# Patient Record
Sex: MALE | Race: WHITE | NOT HISPANIC OR LATINO | Employment: UNEMPLOYED | URBAN - METROPOLITAN AREA
[De-identification: names, ages, dates, MRNs, and addresses within clinical notes are randomized per-mention and may not be internally consistent; named-entity substitution may affect disease eponyms.]

---

## 2017-12-17 ENCOUNTER — APPOINTMENT (OUTPATIENT)
Dept: GENERAL RADIOLOGY | Facility: HOSPITAL | Age: 42
End: 2017-12-17

## 2017-12-17 ENCOUNTER — HOSPITAL ENCOUNTER (INPATIENT)
Facility: HOSPITAL | Age: 42
LOS: 1 days | Discharge: HOME | End: 2017-12-18
Attending: EMERGENCY MEDICINE | Admitting: INTERNAL MEDICINE

## 2017-12-17 DIAGNOSIS — I49.01 VENTRICULAR FIBRILLATION (HCC): ICD-10-CM

## 2017-12-17 DIAGNOSIS — I21.3 ST ELEVATION MYOCARDIAL INFARCTION (STEMI), UNSPECIFIED ARTERY (HCC): Primary | ICD-10-CM

## 2017-12-17 LAB
ALBUMIN SERPL-MCNC: 4.3 G/DL (ref 3.5–5.2)
ALBUMIN/GLOB SERPL: 1.4 G/DL
ALP SERPL-CCNC: 76 U/L (ref 39–117)
ALT SERPL W P-5'-P-CCNC: 39 U/L (ref 1–41)
ANION GAP SERPL CALCULATED.3IONS-SCNC: 15 MMOL/L
AST SERPL-CCNC: 23 U/L (ref 1–40)
BASOPHILS # BLD AUTO: 0.03 10*3/MM3 (ref 0–0.2)
BASOPHILS NFR BLD AUTO: 0.3 % (ref 0–1.5)
BILIRUB SERPL-MCNC: 0.2 MG/DL (ref 0.1–1.2)
BUN BLD-MCNC: 18 MG/DL (ref 6–20)
BUN/CREAT SERPL: 14.5 (ref 7–25)
CALCIUM SPEC-SCNC: 10.2 MG/DL (ref 8.6–10.5)
CHLORIDE SERPL-SCNC: 101 MMOL/L (ref 98–107)
CO2 SERPL-SCNC: 24 MMOL/L (ref 22–29)
CREAT BLD-MCNC: 1.24 MG/DL (ref 0.76–1.27)
DEPRECATED RDW RBC AUTO: 47.7 FL (ref 37–54)
EOSINOPHIL # BLD AUTO: 0.07 10*3/MM3 (ref 0–0.7)
EOSINOPHIL NFR BLD AUTO: 0.6 % (ref 0.3–6.2)
ERYTHROCYTE [DISTWIDTH] IN BLOOD BY AUTOMATED COUNT: 14.2 % (ref 11.5–14.5)
GFR SERPL CREATININE-BSD FRML MDRD: 64 ML/MIN/1.73
GLOBULIN UR ELPH-MCNC: 3 GM/DL
GLUCOSE BLD-MCNC: 122 MG/DL (ref 65–99)
GLUCOSE BLDC GLUCOMTR-MCNC: 118 MG/DL (ref 70–130)
HCT VFR BLD AUTO: 42.4 % (ref 40.4–52.2)
HGB BLD-MCNC: 13.9 G/DL (ref 13.7–17.6)
HOLD SPECIMEN: NORMAL
HOLD SPECIMEN: NORMAL
IMM GRANULOCYTES # BLD: 0.04 10*3/MM3 (ref 0–0.03)
IMM GRANULOCYTES NFR BLD: 0.3 % (ref 0–0.5)
INR PPP: 1.03 (ref 0.9–1.1)
LYMPHOCYTES # BLD AUTO: 3.86 10*3/MM3 (ref 0.9–4.8)
LYMPHOCYTES NFR BLD AUTO: 33.2 % (ref 19.6–45.3)
MAGNESIUM SERPL-MCNC: 2.1 MG/DL (ref 1.6–2.6)
MCH RBC QN AUTO: 30.3 PG (ref 27–32.7)
MCHC RBC AUTO-ENTMCNC: 32.8 G/DL (ref 32.6–36.4)
MCV RBC AUTO: 92.4 FL (ref 79.8–96.2)
MONOCYTES # BLD AUTO: 0.88 10*3/MM3 (ref 0.2–1.2)
MONOCYTES NFR BLD AUTO: 7.6 % (ref 5–12)
NEUTROPHILS # BLD AUTO: 6.75 10*3/MM3 (ref 1.9–8.1)
NEUTROPHILS NFR BLD AUTO: 58 % (ref 42.7–76)
PLATELET # BLD AUTO: 226 10*3/MM3 (ref 140–500)
PMV BLD AUTO: 11.5 FL (ref 6–12)
POTASSIUM BLD-SCNC: 4.4 MMOL/L (ref 3.5–5.2)
PROT SERPL-MCNC: 7.3 G/DL (ref 6–8.5)
PROTHROMBIN TIME: 13.1 SECONDS (ref 11.7–14.2)
RBC # BLD AUTO: 4.59 10*6/MM3 (ref 4.6–6)
SODIUM BLD-SCNC: 140 MMOL/L (ref 136–145)
TROPONIN T SERPL-MCNC: <0.01 NG/ML (ref 0–0.03)
WBC NRBC COR # BLD: 11.63 10*3/MM3 (ref 4.5–10.7)
WHOLE BLOOD HOLD SPECIMEN: NORMAL
WHOLE BLOOD HOLD SPECIMEN: NORMAL

## 2017-12-17 PROCEDURE — C1725 CATH, TRANSLUMIN NON-LASER: HCPCS | Performed by: INTERNAL MEDICINE

## 2017-12-17 PROCEDURE — C1757 CATH, THROMBECTOMY/EMBOLECT: HCPCS | Performed by: INTERNAL MEDICINE

## 2017-12-17 PROCEDURE — 25010000002 HEPARIN (PORCINE) PER 1000 UNITS: Performed by: EMERGENCY MEDICINE

## 2017-12-17 PROCEDURE — 25010000002 MIDAZOLAM PER 1 MG: Performed by: INTERNAL MEDICINE

## 2017-12-17 PROCEDURE — 93458 L HRT ARTERY/VENTRICLE ANGIO: CPT | Performed by: INTERNAL MEDICINE

## 2017-12-17 PROCEDURE — 0 IOPAMIDOL PER 1 ML: Performed by: INTERNAL MEDICINE

## 2017-12-17 PROCEDURE — 25010000002 AMIODARONE IN DEXTROSE 5% 360-4.14 MG/200ML-% SOLUTION: Performed by: EMERGENCY MEDICINE

## 2017-12-17 PROCEDURE — 25010000002 HEPARIN (PORCINE) PER 1000 UNITS: Performed by: INTERNAL MEDICINE

## 2017-12-17 PROCEDURE — 25010000002 AMIODARONE IN DEXTROSE 5% 150-4.21 MG/100ML-% SOLUTION: Performed by: EMERGENCY MEDICINE

## 2017-12-17 PROCEDURE — 4A023N7 MEASUREMENT OF CARDIAC SAMPLING AND PRESSURE, LEFT HEART, PERCUTANEOUS APPROACH: ICD-10-PCS | Performed by: INTERNAL MEDICINE

## 2017-12-17 PROCEDURE — 93010 ELECTROCARDIOGRAM REPORT: CPT | Performed by: INTERNAL MEDICINE

## 2017-12-17 PROCEDURE — 93005 ELECTROCARDIOGRAM TRACING: CPT | Performed by: INTERNAL MEDICINE

## 2017-12-17 PROCEDURE — 027035Z DILATION OF CORONARY ARTERY, ONE ARTERY WITH TWO DRUG-ELUTING INTRALUMINAL DEVICES, PERCUTANEOUS APPROACH: ICD-10-PCS | Performed by: INTERNAL MEDICINE

## 2017-12-17 PROCEDURE — 02C03ZZ EXTIRPATION OF MATTER FROM CORONARY ARTERY, ONE ARTERY, PERCUTANEOUS APPROACH: ICD-10-PCS | Performed by: INTERNAL MEDICINE

## 2017-12-17 PROCEDURE — 85610 PROTHROMBIN TIME: CPT | Performed by: EMERGENCY MEDICINE

## 2017-12-17 PROCEDURE — C1769 GUIDE WIRE: HCPCS | Performed by: INTERNAL MEDICINE

## 2017-12-17 PROCEDURE — C9606 PERC D-E COR REVASC W AMI S: HCPCS | Performed by: INTERNAL MEDICINE

## 2017-12-17 PROCEDURE — C1887 CATHETER, GUIDING: HCPCS | Performed by: INTERNAL MEDICINE

## 2017-12-17 PROCEDURE — C1874 STENT, COATED/COV W/DEL SYS: HCPCS | Performed by: INTERNAL MEDICINE

## 2017-12-17 PROCEDURE — 93005 ELECTROCARDIOGRAM TRACING: CPT | Performed by: EMERGENCY MEDICINE

## 2017-12-17 PROCEDURE — 71010 HC CHEST PA OR AP: CPT

## 2017-12-17 PROCEDURE — 25010000002 BH (CUPID ONLY) ADENOSINE 6 MG/100ML MIXTURE: Performed by: INTERNAL MEDICINE

## 2017-12-17 PROCEDURE — B2051ZZ PLAIN RADIOGRAPHY OF LEFT HEART USING LOW OSMOLAR CONTRAST: ICD-10-PCS | Performed by: INTERNAL MEDICINE

## 2017-12-17 PROCEDURE — 25010000002 PHENYLEPHRINE PER 1 ML: Performed by: INTERNAL MEDICINE

## 2017-12-17 PROCEDURE — 80053 COMPREHEN METABOLIC PANEL: CPT | Performed by: EMERGENCY MEDICINE

## 2017-12-17 PROCEDURE — 92960 CARDIOVERSION ELECTRIC EXT: CPT

## 2017-12-17 PROCEDURE — 25010000002 FENTANYL CITRATE (PF) 100 MCG/2ML SOLUTION: Performed by: INTERNAL MEDICINE

## 2017-12-17 PROCEDURE — 85347 COAGULATION TIME ACTIVATED: CPT

## 2017-12-17 PROCEDURE — 99223 1ST HOSP IP/OBS HIGH 75: CPT | Performed by: INTERNAL MEDICINE

## 2017-12-17 PROCEDURE — B2011ZZ PLAIN RADIOGRAPHY OF MULTIPLE CORONARY ARTERIES USING LOW OSMOLAR CONTRAST: ICD-10-PCS | Performed by: INTERNAL MEDICINE

## 2017-12-17 PROCEDURE — 83735 ASSAY OF MAGNESIUM: CPT | Performed by: EMERGENCY MEDICINE

## 2017-12-17 PROCEDURE — 99291 CRITICAL CARE FIRST HOUR: CPT

## 2017-12-17 PROCEDURE — 82962 GLUCOSE BLOOD TEST: CPT

## 2017-12-17 PROCEDURE — 92941 PRQ TRLML REVSC TOT OCCL AMI: CPT | Performed by: INTERNAL MEDICINE

## 2017-12-17 PROCEDURE — 84484 ASSAY OF TROPONIN QUANT: CPT | Performed by: EMERGENCY MEDICINE

## 2017-12-17 PROCEDURE — 85025 COMPLETE CBC W/AUTO DIFF WBC: CPT | Performed by: EMERGENCY MEDICINE

## 2017-12-17 PROCEDURE — C1894 INTRO/SHEATH, NON-LASER: HCPCS | Performed by: INTERNAL MEDICINE

## 2017-12-17 DEVICE — XIENCE ALPINE EVEROLIMUS ELUTING CORONARY STENT SYSTEM 3.50 MM X 38 MM / RAPID-EXCHANGE
Type: IMPLANTABLE DEVICE | Status: FUNCTIONAL
Brand: XIENCE ALPINE

## 2017-12-17 DEVICE — XIENCE ALPINE EVEROLIMUS ELUTING CORONARY STENT SYSTEM 2.75 MM X 18 MM / RAPID-EXCHANGE
Type: IMPLANTABLE DEVICE | Status: FUNCTIONAL
Brand: XIENCE ALPINE

## 2017-12-17 RX ORDER — SODIUM CHLORIDE 9 MG/ML
100 INJECTION, SOLUTION INTRAVENOUS CONTINUOUS
Status: ACTIVE | OUTPATIENT
Start: 2017-12-17 | End: 2017-12-18

## 2017-12-17 RX ORDER — LIDOCAINE HYDROCHLORIDE 20 MG/ML
INJECTION, SOLUTION INFILTRATION; PERINEURAL AS NEEDED
Status: DISCONTINUED | OUTPATIENT
Start: 2017-12-17 | End: 2017-12-17 | Stop reason: HOSPADM

## 2017-12-17 RX ORDER — LORAZEPAM 0.5 MG/1
0.5 TABLET ORAL EVERY 6 HOURS PRN
Status: DISCONTINUED | OUTPATIENT
Start: 2017-12-17 | End: 2017-12-18 | Stop reason: HOSPADM

## 2017-12-17 RX ORDER — HYDROCODONE BITARTRATE AND ACETAMINOPHEN 7.5; 325 MG/1; MG/1
1 TABLET ORAL EVERY 6 HOURS PRN
Status: DISCONTINUED | OUTPATIENT
Start: 2017-12-17 | End: 2017-12-18 | Stop reason: HOSPADM

## 2017-12-17 RX ORDER — NITROGLYCERIN 20 MG/100ML
10-50 INJECTION INTRAVENOUS
Status: DISCONTINUED | OUTPATIENT
Start: 2017-12-17 | End: 2017-12-18 | Stop reason: HOSPADM

## 2017-12-17 RX ORDER — HEPARIN SODIUM 1000 [USP'U]/ML
INJECTION, SOLUTION INTRAVENOUS; SUBCUTANEOUS AS NEEDED
Status: DISCONTINUED | OUTPATIENT
Start: 2017-12-17 | End: 2017-12-17 | Stop reason: HOSPADM

## 2017-12-17 RX ORDER — ATROPINE SULFATE 1 MG/ML
INJECTION, SOLUTION INTRAMUSCULAR; INTRAVENOUS; SUBCUTANEOUS AS NEEDED
Status: DISCONTINUED | OUTPATIENT
Start: 2017-12-17 | End: 2017-12-17 | Stop reason: HOSPADM

## 2017-12-17 RX ORDER — FENTANYL CITRATE 50 UG/ML
INJECTION, SOLUTION INTRAMUSCULAR; INTRAVENOUS AS NEEDED
Status: DISCONTINUED | OUTPATIENT
Start: 2017-12-17 | End: 2017-12-17 | Stop reason: HOSPADM

## 2017-12-17 RX ORDER — CLOPIDOGREL BISULFATE 75 MG/1
600 TABLET ORAL ONCE
Status: COMPLETED | OUTPATIENT
Start: 2017-12-17 | End: 2017-12-17

## 2017-12-17 RX ORDER — MIDAZOLAM HYDROCHLORIDE 1 MG/ML
INJECTION INTRAMUSCULAR; INTRAVENOUS AS NEEDED
Status: DISCONTINUED | OUTPATIENT
Start: 2017-12-17 | End: 2017-12-17 | Stop reason: HOSPADM

## 2017-12-17 RX ORDER — NITROGLYCERIN 5 MG/ML
INJECTION, SOLUTION INTRAVENOUS AS NEEDED
Status: DISCONTINUED | OUTPATIENT
Start: 2017-12-17 | End: 2017-12-17 | Stop reason: HOSPADM

## 2017-12-17 RX ORDER — CLOPIDOGREL BISULFATE 75 MG/1
75 TABLET ORAL DAILY
Status: DISCONTINUED | OUTPATIENT
Start: 2017-12-18 | End: 2017-12-18 | Stop reason: HOSPADM

## 2017-12-17 RX ORDER — HEPARIN SODIUM 5000 [USP'U]/ML
4000 INJECTION, SOLUTION INTRAVENOUS; SUBCUTANEOUS ONCE
Status: COMPLETED | OUTPATIENT
Start: 2017-12-17 | End: 2017-12-17

## 2017-12-17 RX ORDER — SODIUM CHLORIDE 9 MG/ML
INJECTION, SOLUTION INTRAVENOUS CONTINUOUS PRN
Status: DISCONTINUED | OUTPATIENT
Start: 2017-12-17 | End: 2017-12-17 | Stop reason: HOSPADM

## 2017-12-17 RX ORDER — SODIUM CHLORIDE 0.9 % (FLUSH) 0.9 %
10 SYRINGE (ML) INJECTION AS NEEDED
Status: DISCONTINUED | OUTPATIENT
Start: 2017-12-17 | End: 2017-12-18 | Stop reason: HOSPADM

## 2017-12-17 RX ORDER — LORAZEPAM 1 MG/1
1 TABLET ORAL EVERY 6 HOURS PRN
Status: DISCONTINUED | OUTPATIENT
Start: 2017-12-17 | End: 2017-12-18 | Stop reason: HOSPADM

## 2017-12-17 RX ORDER — ASPIRIN 81 MG/1
81 TABLET, CHEWABLE ORAL DAILY
Status: DISCONTINUED | OUTPATIENT
Start: 2017-12-18 | End: 2017-12-18 | Stop reason: HOSPADM

## 2017-12-17 RX ORDER — CYCLOBENZAPRINE HCL 10 MG
10 TABLET ORAL 3 TIMES DAILY PRN
Status: DISCONTINUED | OUTPATIENT
Start: 2017-12-17 | End: 2017-12-18 | Stop reason: HOSPADM

## 2017-12-17 RX ADMIN — SODIUM CHLORIDE 1000 ML: 9 INJECTION, SOLUTION INTRAVENOUS at 18:00

## 2017-12-17 RX ADMIN — AMIODARONE HYDROCHLORIDE 0.5 MG/MIN: 1.8 INJECTION, SOLUTION INTRAVENOUS at 23:28

## 2017-12-17 RX ADMIN — HYDROCODONE BITARTRATE AND ACETAMINOPHEN 1 TABLET: 7.5; 325 TABLET ORAL at 20:21

## 2017-12-17 RX ADMIN — CLOPIDOGREL BISULFATE 600 MG: 75 TABLET, FILM COATED ORAL at 18:08

## 2017-12-17 RX ADMIN — AMIODARONE HYDROCHLORIDE 150 MG: 1.5 INJECTION, SOLUTION INTRAVENOUS at 18:06

## 2017-12-17 RX ADMIN — HEPARIN SODIUM 4000 UNITS: 5000 INJECTION, SOLUTION INTRAVENOUS; SUBCUTANEOUS at 18:18

## 2017-12-17 RX ADMIN — SODIUM CHLORIDE 100 ML/HR: 9 INJECTION, SOLUTION INTRAVENOUS at 20:47

## 2017-12-17 RX ADMIN — AMIODARONE HYDROCHLORIDE 0.5 MG/MIN: 1.8 INJECTION, SOLUTION INTRAVENOUS at 18:19

## 2017-12-17 RX ADMIN — LORAZEPAM 1 MG: 1 TABLET ORAL at 23:43

## 2017-12-17 RX ADMIN — SODIUM CHLORIDE 100 ML/HR: 9 INJECTION, SOLUTION INTRAVENOUS at 23:28

## 2017-12-17 RX ADMIN — METOPROLOL TARTRATE 5 MG: 5 INJECTION, SOLUTION INTRAVENOUS at 18:16

## 2017-12-17 NOTE — ED NOTES
Pulse palpable present after shocked, pt alert , pt normal sinus rhythm.     Mireya Ford RN  12/17/17 4126

## 2017-12-17 NOTE — ED PROVIDER NOTES
EMERGENCY DEPARTMENT ENCOUNTER    CHIEF COMPLAINT  Chief Complaint: chest pain  History given by: EMS, patient  History limited by: nothing  Room Number: Pool/CATH  PMD: No Known Provider      HPI:  Pt is a 42 y.o. male who presents complaining of substernal chest pain that started around 16:30. Pt states the pain radiated into his BUE at onset but its not at this time. Pt also c/o diaphoresis and mild shortness of breath and denies N/V. Per EMS, Pt has been given 325 asa and nitro x1. Pt denies heart hx but does states hx of hypertension and smoking.     Duration:  1.5 hours  Onset: sudden  Timing: constant  Location: substernal  Radiation: BUE  Intensity/Severity: severe  Progression: unchanged  Associated Symptoms: diaphoresis, shortness of breath  Previous Episodes: none  Treatment before arrival: asa 325 mg and nitro x1    PAST MEDICAL HISTORY  Active Ambulatory Problems     Diagnosis Date Noted   • No Active Ambulatory Problems     Resolved Ambulatory Problems     Diagnosis Date Noted   • No Resolved Ambulatory Problems     Past Medical History:   Diagnosis Date   • Hyperlipidemia    • Hypertension        PAST SURGICAL HISTORY  Past Surgical History:   Procedure Laterality Date   • APPENDECTOMY         FAMILY HISTORY  History reviewed. No pertinent family history.    SOCIAL HISTORY  Social History     Social History   • Marital status: Single     Spouse name: N/A   • Number of children: N/A   • Years of education: N/A     Occupational History   • Not on file.     Social History Main Topics   • Smoking status: Heavy Tobacco Smoker     Packs/day: 1.00   • Smokeless tobacco: Not on file   • Alcohol use Yes      Comment: occ   • Drug use: No   • Sexual activity: No     Other Topics Concern   • Not on file     Social History Narrative       ALLERGIES  Review of patient's allergies indicates no known allergies.    REVIEW OF SYSTEMS  Review of Systems   Constitutional: Positive for diaphoresis. Negative for chills  and fever.   HENT: Negative.  Negative for sore throat.    Eyes: Negative.    Respiratory: Positive for shortness of breath. Negative for cough.    Cardiovascular: Positive for chest pain.   Gastrointestinal: Negative.    Genitourinary: Negative.  Negative for dysuria.   Musculoskeletal: Negative.  Negative for back pain.   Skin: Negative.  Negative for rash.   Neurological: Negative.  Negative for headaches.       PHYSICAL EXAM  ED Triage Vitals   Temp Heart Rate Resp BP SpO2   12/17/17 1823 12/17/17 1805 12/17/17 1819 12/17/17 1818 12/17/17 1805   97.9 °F (36.6 °C) 96 20 185/130 99 %      Temp src Heart Rate Source Patient Position BP Location FiO2 (%)   12/17/17 1823 12/17/17 1821 12/17/17 1818 12/17/17 1818 --   Tympanic Monitor Sitting Right arm          Physical Exam   Constitutional: He is oriented to person, place, and time. He appears distressed (moderate).   HENT:   Head: Normocephalic and atraumatic.   Mouth/Throat: Mucous membranes are dry.   Eyes: EOM are normal. Pupils are equal, round, and reactive to light.   Neck: Normal range of motion. Neck supple.   Cardiovascular: Normal rate, regular rhythm and normal heart sounds.    /125   Pulmonary/Chest: Effort normal and breath sounds normal. No respiratory distress.   Abdominal: Soft. There is no tenderness. There is no rebound and no guarding.   Musculoskeletal: Normal range of motion. He exhibits no edema.   Neurological: He is alert and oriented to person, place, and time. He has normal sensation and normal strength.   Skin: Skin is warm and dry.   Psychiatric: Mood and affect normal.   Nursing note and vitals reviewed.      LAB RESULTS  Lab Results (last 24 hours)     Procedure Component Value Units Date/Time    CBC & Differential [[id number]] Collected:  12/17/17 1810    Specimen:  Blood Updated:  12/17/17 1824    Narrative:       The following orders were created for panel order CBC & Differential.  Procedure                                Abnormality         Status                     ---------                               -----------         ------                     CBC Auto Differential[[id number]]        Abnormal            Final result                 Please view results for these tests on the individual orders.    Troponin [[id number]]  (Normal) Collected:  12/17/17 1810    Specimen:  Blood Updated:  12/17/17 1840     Troponin T <0.010 ng/mL     Narrative:       Troponin T Reference Ranges:  Less than 0.03 ng/mL:    Negative for AMI  0.03 to 0.09 ng/mL:      Indeterminant for AMI  Greater than 0.09 ng/mL: Positive for AMI    Comprehensive Metabolic Panel [[id number]]  (Abnormal) Collected:  12/17/17 1810    Specimen:  Blood Updated:  12/17/17 1840     Glucose 122 (H) mg/dL      BUN 18 mg/dL      Creatinine 1.24 mg/dL      Sodium 140 mmol/L      Potassium 4.4 mmol/L      Chloride 101 mmol/L      CO2 24.0 mmol/L      Calcium 10.2 mg/dL      Total Protein 7.3 g/dL      Albumin 4.30 g/dL      ALT (SGPT) 39 U/L      AST (SGOT) 23 U/L      Alkaline Phosphatase 76 U/L      Total Bilirubin 0.2 mg/dL      eGFR Non African Amer 64 mL/min/1.73      Globulin 3.0 gm/dL      A/G Ratio 1.4 g/dL      BUN/Creatinine Ratio 14.5     Anion Gap 15.0 mmol/L     Magnesium [[id number]]  (Normal) Collected:  12/17/17 1810    Specimen:  Blood Updated:  12/17/17 1840     Magnesium 2.1 mg/dL     Protime-INR [[id number]]  (Normal) Collected:  12/17/17 1810    Specimen:  Blood Updated:  12/17/17 1837     Protime 13.1 Seconds      INR 1.03    CBC Auto Differential [[id number]]  (Abnormal) Collected:  12/17/17 1810    Specimen:  Blood Updated:  12/17/17 1824     WBC 11.63 (H) 10*3/mm3      RBC 4.59 (L) 10*6/mm3      Hemoglobin 13.9 g/dL      Hematocrit 42.4 %      MCV 92.4 fL      MCH 30.3 pg      MCHC 32.8 g/dL      RDW 14.2 %      RDW-SD 47.7 fl      MPV 11.5 fL      Platelets 226 10*3/mm3      Neutrophil % 58.0 %      Lymphocyte % 33.2 %      Monocyte % 7.6 %      Eosinophil %  0.6 %      Basophil % 0.3 %      Immature Grans % 0.3 %      Neutrophils, Absolute 6.75 10*3/mm3      Lymphocytes, Absolute 3.86 10*3/mm3      Monocytes, Absolute 0.88 10*3/mm3      Eosinophils, Absolute 0.07 10*3/mm3      Basophils, Absolute 0.03 10*3/mm3      Immature Grans, Absolute 0.04 (H) 10*3/mm3           I ordered the above labs and reviewed the results    RADIOLOGY  XR Chest 1 View    (Results Pending)        I ordered the above noted radiological studies. Interpreted by radiologist. . Reviewed by me in PACS.       PROCEDURES  Critical Care  Performed by: TAYLOR RIVERO  Authorized by: TAYLOR RIVERO     Critical care provider statement:     Critical care time (minutes):  30    Critical care time was exclusive of:  Separately billable procedures and treating other patients    Critical care was necessary to treat or prevent imminent or life-threatening deterioration of the following conditions:  Cardiac failure    Critical care was time spent personally by me on the following activities:  Blood draw for specimens, development of treatment plan with patient or surrogate, discussions with consultants, evaluation of patient's response to treatment, examination of patient, obtaining history from patient or surrogate, ordering and review of laboratory studies, ordering and review of radiographic studies, ordering and performing treatments and interventions, pulse oximetry, re-evaluation of patient's condition and review of old charts        EKG    EKG time: 18:06  Rhythm/Rate: Sinus tachycardia, rate 101  ST elvation in infeior leads with ST depression anteriolateral leads, consistent with acute inferior posterior MI      Changed compared to prior in September 2016    Interpreted Contemporaneously by me.  Independently viewed by me      PROGRESS AND CONSULTS  ED Course     5:59 PM  EMS called ahead with STEMI seen on EKG. Priscila charge RN has spoken with Dr. Sahu (cardiologist) and has activated the cath lab.      6:03 PM  Pt in V-fib. Pt was shocked x1 with return to NSR and pulse.      6:05 PM  Ordered CBC. Ordered amiodarone bolus and drip.     6:08 PM  Discussed with Pt that he is having a STEMI and plan to take him to the cath lab for further treatment.     6:15 PM  Ordered lopressor.     6:28 PM  Pt rechecked and is resting comfortably. BP- (!) 153/118 HR- 92 Temp- 97.9 °F (36.6 °C) (Tympanic) O2 sat- 99%. Pt/family verbalized understanding and agree with plan to take Pt to the cath lab for intervention. All questions and concerns addressed at this time.     6:31 PM  Pt is stable still with mild substernal chest pain. Pt taken to cath lab.         MEDICAL DECISION MAKING  Results were reviewed/discussed with the patient and they were also made aware of online access. Pt also made aware that some labs, such as cultures, will not be resulted during ER visit and follow up with PMD is necessary.     MDM  Number of Diagnoses or Management Options  ST elevation myocardial infarction (STEMI), unspecified artery:   Ventricular fibrillation:      Amount and/or Complexity of Data Reviewed  Clinical lab tests: ordered and reviewed  Tests in the radiology section of CPT®: ordered and reviewed  Tests in the medicine section of CPT®: ordered and reviewed (See procedure note)           DIAGNOSIS  Final diagnoses:   ST elevation myocardial infarction (STEMI), unspecified artery   Ventricular fibrillation       DISPOSITION  Send to cath lab    Latest Documented Vital Signs:  As of 6:43 PM  BP- (!) 144/110 HR- 94 Temp- 97.9 °F (36.6 °C) (Tympanic) O2 sat- 100%    --  Documentation assistance provided by mukul Tavarez for Dr. Marcial.  Information recorded by the scribe was done at my direction and has been verified and validated by me.          Selin Tavarez  12/17/17 1843       Faisal Marcial MD  12/17/17 2035

## 2017-12-17 NOTE — ED NOTES
Pt on vf, shocked 120 J. Charge nurse Priscila in room, MD in room.     Mireya Ford RN  12/17/17 0402

## 2017-12-17 NOTE — ED NOTES
Bedside report given to primary nurse, Francesca BALDWIN.   MD currently in room with pt.     Mireya Ford RN  12/17/17 6556

## 2017-12-18 ENCOUNTER — APPOINTMENT (OUTPATIENT)
Dept: CARDIOLOGY | Facility: HOSPITAL | Age: 42
End: 2017-12-18
Attending: INTERNAL MEDICINE

## 2017-12-18 VITALS
SYSTOLIC BLOOD PRESSURE: 129 MMHG | WEIGHT: 210 LBS | DIASTOLIC BLOOD PRESSURE: 87 MMHG | RESPIRATION RATE: 18 BRPM | HEART RATE: 72 BPM | OXYGEN SATURATION: 97 % | HEIGHT: 69 IN | TEMPERATURE: 98.1 F | BODY MASS INDEX: 31.1 KG/M2

## 2017-12-18 LAB
ACT BLD: 329 SECONDS (ref 82–152)
ANION GAP SERPL CALCULATED.3IONS-SCNC: 11.2 MMOL/L
AORTIC DIMENSIONLESS INDEX: 0.8 (DI)
BH CV ECHO MEAS - ACS: 2.3 CM
BH CV ECHO MEAS - AO MAX PG: 6 MMHG
BH CV ECHO MEAS - AO MEAN PG (FULL): 1 MMHG
BH CV ECHO MEAS - AO MEAN PG: 3 MMHG
BH CV ECHO MEAS - AO ROOT AREA (BSA CORRECTED): 1.8
BH CV ECHO MEAS - AO ROOT AREA: 10.8 CM^2
BH CV ECHO MEAS - AO ROOT DIAM: 3.7 CM
BH CV ECHO MEAS - AO V2 MAX: 120 CM/SEC
BH CV ECHO MEAS - AO V2 MEAN: 87.2 CM/SEC
BH CV ECHO MEAS - AO V2 VTI: 22.7 CM
BH CV ECHO MEAS - ASC AORTA: 3.8 CM
BH CV ECHO MEAS - AVA(I,A): 3.1 CM^2
BH CV ECHO MEAS - AVA(I,D): 3.1 CM^2
BH CV ECHO MEAS - BSA(HAYCOCK): 2.2 M^2
BH CV ECHO MEAS - BSA: 2.1 M^2
BH CV ECHO MEAS - BZI_BMI: 31 KILOGRAMS/M^2
BH CV ECHO MEAS - BZI_METRIC_HEIGHT: 175.3 CM
BH CV ECHO MEAS - BZI_METRIC_WEIGHT: 95.3 KG
BH CV ECHO MEAS - CONTRAST EF (2CH): 59.3 ML/M^2
BH CV ECHO MEAS - CONTRAST EF 4CH: 58 ML/M^2
BH CV ECHO MEAS - EDV(CUBED): 97.3 ML
BH CV ECHO MEAS - EDV(MOD-SP2): 86 ML
BH CV ECHO MEAS - EDV(MOD-SP4): 88 ML
BH CV ECHO MEAS - EDV(TEICH): 97.3 ML
BH CV ECHO MEAS - EF(CUBED): 59.6 %
BH CV ECHO MEAS - EF(MOD-SP2): 59.3 %
BH CV ECHO MEAS - EF(MOD-SP4): 58 %
BH CV ECHO MEAS - EF(TEICH): 51.3 %
BH CV ECHO MEAS - ESV(CUBED): 39.3 ML
BH CV ECHO MEAS - ESV(MOD-SP2): 35 ML
BH CV ECHO MEAS - ESV(MOD-SP4): 37 ML
BH CV ECHO MEAS - ESV(TEICH): 47.4 ML
BH CV ECHO MEAS - FS: 26.1 %
BH CV ECHO MEAS - IVS/LVPW: 1.2
BH CV ECHO MEAS - IVSD: 1.1 CM
BH CV ECHO MEAS - LAT PEAK E' VEL: 11 CM/SEC
BH CV ECHO MEAS - LV DIASTOLIC VOL/BSA (35-75): 41.7 ML/M^2
BH CV ECHO MEAS - LV MASS(C)D: 158.8 GRAMS
BH CV ECHO MEAS - LV MASS(C)DI: 75.3 GRAMS/M^2
BH CV ECHO MEAS - LV MEAN PG: 2 MMHG
BH CV ECHO MEAS - LV SYSTOLIC VOL/BSA (12-30): 17.5 ML/M^2
BH CV ECHO MEAS - LV V1 MAX: 78 CM/SEC
BH CV ECHO MEAS - LV V1 MEAN: 58.3 CM/SEC
BH CV ECHO MEAS - LV V1 VTI: 18.3 CM
BH CV ECHO MEAS - LVIDD: 4.6 CM
BH CV ECHO MEAS - LVIDS: 3.4 CM
BH CV ECHO MEAS - LVLD AP2: 8 CM
BH CV ECHO MEAS - LVLD AP4: 8.6 CM
BH CV ECHO MEAS - LVLS AP2: 5.7 CM
BH CV ECHO MEAS - LVLS AP4: 6.8 CM
BH CV ECHO MEAS - LVOT AREA (M): 3.8 CM^2
BH CV ECHO MEAS - LVOT AREA: 3.8 CM^2
BH CV ECHO MEAS - LVOT DIAM: 2.2 CM
BH CV ECHO MEAS - LVPWD: 0.9 CM
BH CV ECHO MEAS - MED PEAK E' VEL: 9 CM/SEC
BH CV ECHO MEAS - MR MAX PG: 42.2 MMHG
BH CV ECHO MEAS - MR MAX VEL: 318 CM/SEC
BH CV ECHO MEAS - MV A DUR: 0.15 SEC
BH CV ECHO MEAS - MV A MAX VEL: 54.4 CM/SEC
BH CV ECHO MEAS - MV DEC SLOPE: 504 CM/SEC^2
BH CV ECHO MEAS - MV DEC TIME: 0.21 SEC
BH CV ECHO MEAS - MV E MAX VEL: 81.8 CM/SEC
BH CV ECHO MEAS - MV E/A: 1.5
BH CV ECHO MEAS - MV MAX PG: 5 MMHG
BH CV ECHO MEAS - MV MEAN PG: 2 MMHG
BH CV ECHO MEAS - MV P1/2T MAX VEL: 111 CM/SEC
BH CV ECHO MEAS - MV P1/2T: 64.5 MSEC
BH CV ECHO MEAS - MV V2 MEAN: 56.7 CM/SEC
BH CV ECHO MEAS - MV V2 VTI: 28.2 CM
BH CV ECHO MEAS - MVA P1/2T LCG: 2 CM^2
BH CV ECHO MEAS - MVA(P1/2T): 3.4 CM^2
BH CV ECHO MEAS - MVA(VTI): 2.5 CM^2
BH CV ECHO MEAS - PA ACC SLOPE: 1217 CM/SEC^2
BH CV ECHO MEAS - PA ACC TIME: 0.07 SEC
BH CV ECHO MEAS - PA MAX PG (FULL): 2.6 MMHG
BH CV ECHO MEAS - PA MAX PG: 3.6 MMHG
BH CV ECHO MEAS - PA PR(ACCEL): 45.7 MMHG
BH CV ECHO MEAS - PA V2 MAX: 94.6 CM/SEC
BH CV ECHO MEAS - PULM A REVS DUR: 0.07 SEC
BH CV ECHO MEAS - PULM A REVS VEL: 27.4 CM/SEC
BH CV ECHO MEAS - PULM DIAS VEL: 37.8 CM/SEC
BH CV ECHO MEAS - PULM S/D: 1.5
BH CV ECHO MEAS - PULM SYS VEL: 55 CM/SEC
BH CV ECHO MEAS - PVA(V,A): 1.2 CM^2
BH CV ECHO MEAS - PVA(V,D): 1.2 CM^2
BH CV ECHO MEAS - QP/QS: 0.33
BH CV ECHO MEAS - RAP SYSTOLE: 8 MMHG
BH CV ECHO MEAS - RV MAX PG: 0.94 MMHG
BH CV ECHO MEAS - RV MEAN PG: 1 MMHG
BH CV ECHO MEAS - RV V1 MAX: 48.6 CM/SEC
BH CV ECHO MEAS - RV V1 MEAN: 37.7 CM/SEC
BH CV ECHO MEAS - RV V1 VTI: 10.2 CM
BH CV ECHO MEAS - RVOT AREA: 2.3 CM^2
BH CV ECHO MEAS - RVOT DIAM: 1.7 CM
BH CV ECHO MEAS - RVSP: 34.6 MMHG
BH CV ECHO MEAS - SI(AO): 115.7 ML/M^2
BH CV ECHO MEAS - SI(CUBED): 27.5 ML/M^2
BH CV ECHO MEAS - SI(LVOT): 33 ML/M^2
BH CV ECHO MEAS - SI(MOD-SP2): 24.2 ML/M^2
BH CV ECHO MEAS - SI(MOD-SP4): 24.2 ML/M^2
BH CV ECHO MEAS - SI(TEICH): 23.7 ML/M^2
BH CV ECHO MEAS - SV(AO): 244.1 ML
BH CV ECHO MEAS - SV(CUBED): 58 ML
BH CV ECHO MEAS - SV(LVOT): 69.6 ML
BH CV ECHO MEAS - SV(MOD-SP2): 51 ML
BH CV ECHO MEAS - SV(MOD-SP4): 51 ML
BH CV ECHO MEAS - SV(RVOT): 23.2 ML
BH CV ECHO MEAS - SV(TEICH): 49.9 ML
BH CV ECHO MEAS - TAPSE (>1.6): 2.2 CM2
BH CV ECHO MEAS - TR MAX VEL: 258 CM/SEC
BH CV VAS BP RIGHT ARM: NORMAL MMHG
BH CV XLRA - RV BASE: 3 CM
BH CV XLRA - TDI S': 13 CM/SEC
BUN BLD-MCNC: 15 MG/DL (ref 6–20)
BUN/CREAT SERPL: 17.6 (ref 7–25)
CALCIUM SPEC-SCNC: 8 MG/DL (ref 8.6–10.5)
CHLORIDE SERPL-SCNC: 106 MMOL/L (ref 98–107)
CHOLEST SERPL-MCNC: 205 MG/DL (ref 0–200)
CO2 SERPL-SCNC: 23.8 MMOL/L (ref 22–29)
CREAT BLD-MCNC: 0.85 MG/DL (ref 0.76–1.27)
DEPRECATED RDW RBC AUTO: 48.8 FL (ref 37–54)
E/E' RATIO: 9
ERYTHROCYTE [DISTWIDTH] IN BLOOD BY AUTOMATED COUNT: 14.4 % (ref 11.5–14.5)
GFR SERPL CREATININE-BSD FRML MDRD: 99 ML/MIN/1.73
GLUCOSE BLD-MCNC: 132 MG/DL (ref 65–99)
GLUCOSE BLDC GLUCOMTR-MCNC: 83 MG/DL (ref 70–130)
HBA1C MFR BLD: 5.39 % (ref 4.8–5.6)
HCT VFR BLD AUTO: 37.7 % (ref 40.4–52.2)
HDLC SERPL-MCNC: 27 MG/DL (ref 40–60)
HGB BLD-MCNC: 12.1 G/DL (ref 13.7–17.6)
LDLC SERPL CALC-MCNC: 111 MG/DL (ref 0–100)
LDLC/HDLC SERPL: 4.11 {RATIO}
LEFT ATRIUM VOLUME INDEX: 23 ML/M2
LV EF 2D ECHO EST: 58 %
MAXIMAL PREDICTED HEART RATE: 179 BPM
MCH RBC QN AUTO: 29.8 PG (ref 27–32.7)
MCHC RBC AUTO-ENTMCNC: 32.1 G/DL (ref 32.6–36.4)
MCV RBC AUTO: 92.9 FL (ref 79.8–96.2)
PLATELET # BLD AUTO: 185 10*3/MM3 (ref 140–500)
PMV BLD AUTO: 11.8 FL (ref 6–12)
POTASSIUM BLD-SCNC: 3.4 MMOL/L (ref 3.5–5.2)
RBC # BLD AUTO: 4.06 10*6/MM3 (ref 4.6–6)
SODIUM BLD-SCNC: 141 MMOL/L (ref 136–145)
STRESS TARGET HR: 152 BPM
TRIGL SERPL-MCNC: 335 MG/DL (ref 0–150)
TROPONIN T SERPL-MCNC: 2.27 NG/ML (ref 0–0.03)
VLDLC SERPL-MCNC: 67 MG/DL (ref 5–40)
WBC NRBC COR # BLD: 8.86 10*3/MM3 (ref 4.5–10.7)

## 2017-12-18 PROCEDURE — 83036 HEMOGLOBIN GLYCOSYLATED A1C: CPT | Performed by: INTERNAL MEDICINE

## 2017-12-18 PROCEDURE — 82962 GLUCOSE BLOOD TEST: CPT

## 2017-12-18 PROCEDURE — 99238 HOSP IP/OBS DSCHRG MGMT 30/<: CPT | Performed by: INTERNAL MEDICINE

## 2017-12-18 PROCEDURE — 93306 TTE W/DOPPLER COMPLETE: CPT | Performed by: INTERNAL MEDICINE

## 2017-12-18 PROCEDURE — 93005 ELECTROCARDIOGRAM TRACING: CPT | Performed by: INTERNAL MEDICINE

## 2017-12-18 PROCEDURE — 80048 BASIC METABOLIC PNL TOTAL CA: CPT | Performed by: INTERNAL MEDICINE

## 2017-12-18 PROCEDURE — 93306 TTE W/DOPPLER COMPLETE: CPT

## 2017-12-18 PROCEDURE — 80061 LIPID PANEL: CPT | Performed by: INTERNAL MEDICINE

## 2017-12-18 PROCEDURE — 93010 ELECTROCARDIOGRAM REPORT: CPT | Performed by: INTERNAL MEDICINE

## 2017-12-18 PROCEDURE — 84484 ASSAY OF TROPONIN QUANT: CPT | Performed by: INTERNAL MEDICINE

## 2017-12-18 PROCEDURE — 85027 COMPLETE CBC AUTOMATED: CPT | Performed by: INTERNAL MEDICINE

## 2017-12-18 RX ORDER — CLOPIDOGREL BISULFATE 75 MG/1
75 TABLET ORAL DAILY
Qty: 30 TABLET | Refills: 5 | Status: SHIPPED | OUTPATIENT
Start: 2017-12-19 | End: 2018-02-28 | Stop reason: SDUPTHER

## 2017-12-18 RX ORDER — ASPIRIN 81 MG/1
81 TABLET, CHEWABLE ORAL DAILY
Qty: 30 TABLET | Refills: 5 | Status: SHIPPED | OUTPATIENT
Start: 2017-12-19 | End: 2018-07-04 | Stop reason: SDUPTHER

## 2017-12-18 RX ORDER — ALUMINA, MAGNESIA, AND SIMETHICONE 2400; 2400; 240 MG/30ML; MG/30ML; MG/30ML
30 SUSPENSION ORAL
Status: DISCONTINUED | OUTPATIENT
Start: 2017-12-18 | End: 2017-12-18 | Stop reason: HOSPADM

## 2017-12-18 RX ORDER — POTASSIUM CHLORIDE 750 MG/1
40 CAPSULE, EXTENDED RELEASE ORAL AS NEEDED
Status: DISCONTINUED | OUTPATIENT
Start: 2017-12-18 | End: 2017-12-18 | Stop reason: HOSPADM

## 2017-12-18 RX ORDER — POTASSIUM CHLORIDE 1.5 G/1.77G
40 POWDER, FOR SOLUTION ORAL AS NEEDED
Status: DISCONTINUED | OUTPATIENT
Start: 2017-12-18 | End: 2017-12-18 | Stop reason: HOSPADM

## 2017-12-18 RX ORDER — PANTOPRAZOLE SODIUM 40 MG/1
40 TABLET, DELAYED RELEASE ORAL
Status: DISCONTINUED | OUTPATIENT
Start: 2017-12-18 | End: 2017-12-18 | Stop reason: HOSPADM

## 2017-12-18 RX ORDER — ATORVASTATIN CALCIUM 20 MG/1
20 TABLET, FILM COATED ORAL DAILY
Qty: 30 TABLET | Refills: 11 | Status: SHIPPED | OUTPATIENT
Start: 2017-12-18 | End: 2018-11-27 | Stop reason: SDUPTHER

## 2017-12-18 RX ORDER — NICOTINE 21 MG/24HR
1 PATCH, TRANSDERMAL 24 HOURS TRANSDERMAL EVERY 24 HOURS
Status: DISCONTINUED | OUTPATIENT
Start: 2017-12-18 | End: 2017-12-18 | Stop reason: HOSPADM

## 2017-12-18 RX ORDER — CARVEDILOL 6.25 MG/1
6.25 TABLET ORAL EVERY 12 HOURS SCHEDULED
Status: DISCONTINUED | OUTPATIENT
Start: 2017-12-18 | End: 2017-12-18 | Stop reason: HOSPADM

## 2017-12-18 RX ADMIN — ALUMINUM HYDROXIDE, MAGNESIUM HYDROXIDE, AND DIMETHICONE 30 ML: 400; 400; 40 SUSPENSION ORAL at 00:38

## 2017-12-18 RX ADMIN — NICOTINE 1 PATCH: 21 PATCH, EXTENDED RELEASE TRANSDERMAL at 15:08

## 2017-12-18 RX ADMIN — HYDROCODONE BITARTRATE AND ACETAMINOPHEN 1 TABLET: 7.5; 325 TABLET ORAL at 04:22

## 2017-12-18 RX ADMIN — PANTOPRAZOLE SODIUM 40 MG: 40 TABLET, DELAYED RELEASE ORAL at 08:01

## 2017-12-18 RX ADMIN — POTASSIUM CHLORIDE 40 MEQ: 750 CAPSULE, EXTENDED RELEASE ORAL at 12:35

## 2017-12-18 RX ADMIN — ASPIRIN 81 MG: 81 TABLET, CHEWABLE ORAL at 08:01

## 2017-12-18 RX ADMIN — PANTOPRAZOLE SODIUM 40 MG: 40 TABLET, DELAYED RELEASE ORAL at 00:38

## 2017-12-18 RX ADMIN — POTASSIUM CHLORIDE 40 MEQ: 1.5 POWDER, FOR SOLUTION ORAL at 08:26

## 2017-12-18 RX ADMIN — HYDROCODONE BITARTRATE AND ACETAMINOPHEN 1 TABLET: 7.5; 325 TABLET ORAL at 10:33

## 2017-12-18 RX ADMIN — CLOPIDOGREL 75 MG: 75 TABLET, FILM COATED ORAL at 08:01

## 2017-12-18 NOTE — DISCHARGE INSTR - APPOINTMENTS
[facility] office closed. Unable to make an appointment with Magui Byrne at this time.   Paperwork given on how to achieve a primary care physician.

## 2017-12-18 NOTE — CONSULTS
Met with pt & sister at [facility] on CVU s/p MI & stent placement. Discussed benefits of cardiac rehab and provided Phase II information packet which includes a yellow cover sheet, a [facility]iac Re[facility] Programs handout, and two [place] [facility] Letter articles that stress the importance of cardiac rehab after a heart event.  Patient was interested and wanted to get registered for the program, so I gave him an appt to start cardiac rehab here at [facility] location on January 8, 2018 @ 9:30 am and provided an appointment packet.  The packet has directions to the facility and what he should bring with him to that 1st appt including his discharge instructions (AVS) from this hospitalization.  I did encourage the patient to call his insurance to check if he has any deductibles or copays.  I pointed out the number to call in case he needs to reschedule or cancel this appointment for any reason and also discussed our snow policy with him.

## 2017-12-18 NOTE — NURSING NOTE
"During data base assessment pt reports drinking beer 9-12 three or four times a week. Last beer was yesterday when pt reports \"he drank around 5.\" Also reports smoking occasional marijuana and cigarettes between 1/2 pack to 1 ppd. Pt denies drinking represents a problem in his life . Denies problems with the law due to drinking     "

## 2017-12-18 NOTE — PLAN OF CARE
Problem: Patient Care Overview (Adult)  Goal: Plan of Care Review  Outcome: Ongoing (interventions implemented as appropriate)    12/18/17 1511   Coping/Psychosocial Response Interventions   Plan Of Care Reviewed With patient   Patient Care Overview   Progress improving   Outcome Evaluation   Outcome Summary/Follow up Plan No complaints at this time. Will continue to monitor.        Goal: Adult Individualization and Mutuality  Outcome: Ongoing (interventions implemented as appropriate)  Goal: Discharge Needs Assessment  Outcome: Ongoing (interventions implemented as appropriate)    Problem: Cardiac Catheterization with/without PCI (Adult)  Goal: Signs and Symptoms of Listed Potential Problems Will be Absent or Manageable (Cardiac Catheterization with/without PCI)  Outcome: Ongoing (interventions implemented as appropriate)    12/18/17 1511   Cardiac Catheterization with/without PCI   Problems Assessed (Cardiac Catheterization) all   Problems Present (Cardiac Catheterization) none         Problem: Pain, Acute (Adult)  Goal: Identify Related Risk Factors and Signs and Symptoms  Outcome: Ongoing (interventions implemented as appropriate)  Goal: Acceptable Pain Control/Comfort Level  Outcome: Ongoing (interventions implemented as appropriate)    12/18/17 1511   Pain, Acute (Adult)   Acceptable Pain Control/Comfort Level making progress toward outcome         Problem: Acute Alcohol Withdrawal Syndrome, Risk For/Actual (Adult)  Goal: Signs and Symptoms of Listed Potential Problems Will be Absent or Manageable (Acute Alcohol Withdrawal Syndrome, Risk For/Actual)  Outcome: Ongoing (interventions implemented as appropriate)    12/18/17 1511   Acute Alcohol Withdrawal Syndrome, Risk For/Actual   Problems Assessed (Alcohol Withdrawal Syndrome) all   Problems Present (Alcohol Withdrawal Syndrome) none         Problem: Fall Risk (Adult)  Goal: Identify Related Risk Factors and Signs and Symptoms  Outcome: Ongoing (interventions  implemented as appropriate)  Goal: Absence of Falls  Outcome: Ongoing (interventions implemented as appropriate)    12/18/17 1511   Fall Risk (Adult)   Absence of Falls making progress toward outcome

## 2017-12-18 NOTE — H&P
Date of Hospital Visit: [unfilled]  Encounter Provider: Nick Sahu MD  Place of Service: [facility] CARDIOLOGY  Patient Name: Ruddy Griffin  :1975    Chief complaint:  Acute inferior STEMI complicated by VF, bradycardia, and cardiogenic shock.      History of Present Illness:    The patient is a 42-year-old man with history of smoking and hypertension.  He presented to the hospital with complaints of nausea and GI discomfort starting yesterday.  He initially thought it was heartburn.  When the pain continued he called EMS today.  EKG in the field demonstrated inferior ST segment elevations.  He had ventricular fibrillation that was treated with a single shock.  He was brought to the cardiac catheterization lab.  There he was found to have a high-grade proximal RCA lesion as well as a thrombotic total occlusion of the distal vessel.  During revascularization he developed cardiogenic shock and severe bradycardia but this stabilized after revascularization.    His LV function is preserved.  He's now stable and without complaint.    Past Medical History:   Diagnosis Date   • Hyperlipidemia    • Hypertension          Past Surgical History:   Procedure Laterality Date   • APPENDECTOMY         Prescriptions Prior to Admission   Medication Sig Dispense Refill Last Dose   • amLODIPine (NORVASC) 10 MG tablet Take 1 tablet by mouth daily. 30 tablet 0    • carvedilol (COREG) 6.25 MG tablet Take 1 tablet by mouth 2 (two) times a day with meals. 60 tablet 0    • cyclobenzaprine (FLEXERIL) 10 MG tablet Take 1 tablet by mouth 3 (three) times a day as needed for muscle spasms. 21 tablet 0    • HYDROcodone-acetaminophen (NORCO) 7.5-325 MG per tablet Take 1 tablet by mouth every 6 (six) hours as needed for moderate pain (4-6). 14 tablet 0        Current Meds  No current facility-administered medications on file prior to encounter.      Current Outpatient Prescriptions on File Prior to Encounter    Medication Sig Dispense Refill   • amLODIPine (NORVASC) 10 MG tablet Take 1 tablet by mouth daily. 30 tablet 0   • carvedilol (COREG) 6.25 MG tablet Take 1 tablet by mouth 2 (two) times a day with meals. 60 tablet 0   • cyclobenzaprine (FLEXERIL) 10 MG tablet Take 1 tablet by mouth 3 (three) times a day as needed for muscle spasms. 21 tablet 0   • HYDROcodone-acetaminophen (NORCO) 7.5-325 MG per tablet Take 1 tablet by mouth every 6 (six) hours as needed for moderate pain (4-6). 14 tablet 0         Social History     Social History   • Marital status: Single     Spouse name: N/A   • Number of children: N/A   • Years of education: N/A     Occupational History   • Not on file.     Social History Main Topics   • Smoking status: Heavy Tobacco Smoker     Packs/day: 1.00   • Smokeless tobacco: Not on file   • Alcohol use Yes      Comment: occ   • Drug use: No   • Sexual activity: No     Other Topics Concern   • Not on file     Social History Narrative     History reviewed. No pertinent family history.      REVIEW OF SYSTEMS:   12 point ROS was performed and is negative except as outlined in HPI     REVIEW OF SYSTEMS:   CONSTITUTIONAL: No weight loss, fever, chills, weakness or fatigue.   HEENT: Eyes: No visual loss, blurred vision, double vision or yellow sclerae. Ears, Nose, Throat: No hearing loss, sneezing, congestion, runny nose or sore throat.   SKIN: No rash or itching.     RESPIRATORY: No shortness of breath, hemoptysis, cough or sputum.   GASTROINTESTINAL: No anorexia, nausea, vomiting or diarrhea. No abdominal pain, bright red blood per rectum or melena.  GENITOURINARY: No burning on urination, hematuria or increased frequency.  NEUROLOGICAL: No headache, dizziness, syncope, paralysis, ataxia, numbness or tingling in the extremities. No change in bowel or bladder control.   MUSCULOSKELETAL: No muscle, back pain, joint pain or stiffness.   HEMATOLOGIC: No anemia, bleeding or bruising.   LYMPHATICS: No enlarged  "nodes. No history of splenectomy.   PSYCHIATRIC: No history of depression, anxiety, hallucinations.   ENDOCRINOLOGIC: No reports of sweating, cold or heat intolerance. No polyuria or polydipsia.       Objective:     Vitals:    12/17/17 1901 12/17/17 1906 12/17/17 1910 12/17/17 1915   BP:       BP Location:       Patient Position:       Pulse:       Resp: 20 18 20 16   Temp:       TempSrc:       SpO2:       Weight:       Height:         Body mass index is 31.01 kg/(m^2).  Flowsheet Rows         First Filed Value    Admission Height  175.3 cm (69\") Documented at 12/17/2017 1813    Admission Weight  95.3 kg (210 lb) Documented at 12/17/2017 1813          General Appearance:    Alert, cooperative, in no acute distress   Head:    Normocephalic, without obvious abnormality, atraumatic   Eyes:            Lids and lashes normal, conjunctivae and sclerae normal, no   icterus, no pallor, corneas clear, PERRLA   Ears:    Ears appear intact with no abnormalities noted   Throat:   No oral lesions, no thrush, oral mucosa moist   Neck:   No adenopathy, supple, trachea midline, no thyromegaly, no   carotid bruit, no JVD   Back:     No kyphosis present, no scoliosis present, no skin lesions, erythema or scars, no tenderness to percussion or palpation, range of motion normal   Lungs:     Clear to auscultation,respirations regular, even and unlabored    Heart:    Regular rhythm and normal rate, normal S1 and S2, no murmur, no gallop, no rub, no click   Chest Wall:    No abnormalities observed   Abdomen:     Normal bowel sounds, no masses, no organomegaly, soft        non-tender, non-distended, no guarding, no rebound  tenderness   Extremities:   Moves all extremities well, no edema, no cyanosis, no redness   Pulses:   Pulses palpable and equal bilaterally. Normal radial, carotid, femoral, dorsalis pedis and posterior tibial pulses bilaterally. Normal abdominal aorta   Skin:   No bleeding, bruising or rash   Lymph nodes:   No palpable " adenopathy   sychiatric:   Alert and oented x 3, normal mood and affect.   Lab Review:      Results from last 7 days  Lab Units 12/17/17  1810   SODIUM mmol/L 140   POTASSIUM mmol/L 4.4   CHLORIDE mmol/L 101   CO2 mmol/L 24.0   BUN mg/dL 18   CREATININE mg/dL 1.24   CALCIUM mg/dL 10.2   BILIRUBIN mg/dL 0.2   ALK PHOS U/L 76   ALT (SGPT) U/L 39   AST (SGOT) U/L 23   GLUCOSE mg/dL 122*       Results from last 7 days  Lab Units 12/17/17  1810   TROPONIN T ng/mL <0.010         Results from last 7 days  Lab Units 12/17/17  1810   WBC 10*3/mm3 11.63*   HEMOGLOBIN g/dL 13.9   HEMATOCRIT % 42.4   PLATELETS 10*3/mm3 226       Results from last 7 days  Lab Units 12/17/17  1810   INR  1.03       Results from last 7 days  Lab Units 12/17/17  1810   MAGNESIUM mg/dL 2.1         I personally viewed and interpreted the patient's EKG/Telemetry data    Assessment:    Acute inferior STEMI successfully revascularized.  He is now stable.  Plan to CCU for routine post-MI care.    Nick Sahu MD  12/17/17

## 2017-12-18 NOTE — NURSING NOTE
Reports back pain improved with po pain medication . Ate 95% of burger chips and diet coke . Aware that burger not heart healthy .  Talking to sister on phone. Education books provided on stenting heart cath and angioplasty

## 2017-12-18 NOTE — DISCHARGE SUMMARY
Date of Discharge:  12/18/2017  Date of Admit: 12/17/2017    Discharge Diagnosis:Active Problems:    ST elevation myocardial infarction (STEMI)      Hospital Course: Mr. Griffin is a 43 y/o patient with a documented h/o HTN and smoking. He was admitted yesterday with c/o nausea and GI discomfort. EKG showed ST segment elevation and he had VF that was treated with a single shock. In the cath lab he was found to have a high grade proximal RCA lesion and acute occlusion of his distal RCA-treated with thrombectomy and LAUREN. During the intervention he developed cardiogenic shock with bradycardia. He was temporarily on Amio and NTG gtts. His HR has been in the 70-80s today, with a stable bp was well. He is ready for d/c. Our office will contact him with a 1 week follow up with Magui Byrne and a 4 week f/u with me.     Procedures Performed  Procedure(s):  Left Heart Cath  Stent LAUREN coronary  Percutaneous Manual Thrombectomy       Consults     Date and Time Order Name Status Description    12/17/2017 1803 Consult Interventional Cardiology and Notify Cath Lab Completed           Pertinent Test Results:   Echo 12/18/17  · Left ventricular systolic function is normal. Calculated EF = 58%. Estimated EF was in agreement with the calculated EF. Estimated EF = 58%. Normal left ventricular cavity size and wall thickness noted. Left ventricular diastolic function is normal.  · Mild mitral valve regurgitation is present.  · Trace tricuspid valve regurgitation is present. Estimated right ventricular systolic pressure from tricuspid regurgitation is normal (<35 mmHg).  · Borderline dilation of the ascending aorta is present. Mid ascending aorta measured 3.8 cm.  The following segments are hypokinetic: basal inferior and mid inferior. All other segments are normal.      Discharge Physical Exam:    General Appearance No acute distress   Neck No adenopathy, supple, trachea midline, no thyromegaly, no carotid bruit, no JVD   Lungs Clear  to auscultation,respirations regular, even and unlabored   Heart Regular rhythm and normal rate, normal S1 and S2, no murmur, no gallop, no rub, no click   Chest wall No abnormalities observed   Abdomen Normal bowel sounds, no masses, no hepatomegaly, soft   Extremities Moves all extremities well, no edema, no cyanosis, no redness   Neurological Alert and oriented x 3     Discharge Medications   Ruddy Griffin   Home Medication Instructions KAREN:[id number]    Printed on:12/18/17 0818   Medication Information                      aspirin 81 MG chewable tablet  Chew 1 tablet Daily.             atorvastatin (LIPITOR) 20 MG tablet  Take 1 tablet by mouth Daily.             carvedilol (COREG) 6.25 MG tablet  Take 1 tablet by mouth 2 (two) times a day with meals.             clopidogrel (PLAVIX) 75 MG tablet  Take 1 tablet by mouth Daily.             cyclobenzaprine (FLEXERIL) 10 MG tablet  Take 1 tablet by mouth 3 (three) times a day as needed for muscle spasms.             HYDROcodone-acetaminophen (NORCO) 7.5-325 MG per tablet  Take 1 tablet by mouth every 6 (six) hours as needed for moderate pain (4-6).                 Discharge Diet:  healthy heart    Activity at Discharge: light activity for 2 weeks    Discharge disposition: home    Condition on Discharge: stable    Follow-up Appointments    As above.       Nick Sahu MD  12/18/17  2:52 PM

## 2017-12-18 NOTE — PAYOR COMM NOTE
"Ruddy Moore (42 y.o. Male)            ATTENTION;   [facility] FOR YOUR REVIEW, REPLY TO UR DEPT, [place] [facility]          [phone] OR UR  [phone]       Date of Birth Social Security Number Address Home Phone MRN    1975  [id number] [facility] [address] [phone] [id number]    Jewish Marital Status          None Single       Admission Date Admission Type Admitting Provider Attending Provider Department, Room/Bed    17 Emergency Nick Sahu MD Tu, Thomas, MD [facility]ON[facility]ARE, 329/    Discharge Date Discharge Disposition Discharge Destination                      Attending Provider: Nick Sahu MD     Allergies:  No Known Allergies    Isolation:  None   Infection:  None   Code Status:  FULL    Ht:  175.3 cm (69\")   Wt:  95.3 kg (210 lb)    Admission Cmt:  None   Principal Problem:  None                Active Insurance as of 2017     Primary Coverage     Payor Plan Insurance Group Employer/Plan Group    PASSPORT PAS[employer]ORT MEDICAID     Payor Plan Address Payor Plan Phone Number Effective From Effective To    [address] [phone] 2014     [place] [address]       Subscriber Name Subscriber Birth Date Member ID       RUDDY MOORE 1975 [id number]                 Emergency Contacts      (Rel.) Home Phone Work Phone Mobile Phone    Kimberlee Steiner (Sister) -- -- [phone]    Ngoc Bond (Other) -- -- [phone]               History & Physical      Nick Sahu MD at 2017  7:29 PM          Date of Hospital Visit: [unfilled]  Encounter Provider: Nick Sahu MD  Place of Service: [facility] CARDIOLOGY  Patient Name: Ruddy Moore  :1975    Chief complaint:  Acute inferior STEMI complicated by VF, bradycardia, and cardiogenic shock.      History of Present Illness:    The patient is a 42-year-old man with history of smoking and hypertension.  He " presented to the hospital with complaints of nausea and GI discomfort starting yesterday.  He initially thought it was heartburn.  When the pain continued he called EMS today.  EKG in the field demonstrated inferior ST segment elevations.  He had ventricular fibrillation that was treated with a single shock.  He was brought to the cardiac catheterization lab.  There he was found to have a high-grade proximal RCA lesion as well as a thrombotic total occlusion of the distal vessel.  During revascularization he developed cardiogenic shock and severe bradycardia but this stabilized after revascularization.    His LV function is preserved.  He's now stable and without complaint.    Past Medical History:   Diagnosis Date   • Hyperlipidemia    • Hypertension          Past Surgical History:   Procedure Laterality Date   • APPENDECTOMY         Prescriptions Prior to Admission   Medication Sig Dispense Refill Last Dose   • amLODIPine (NORVASC) 10 MG tablet Take 1 tablet by mouth daily. 30 tablet 0    • carvedilol (COREG) 6.25 MG tablet Take 1 tablet by mouth 2 (two) times a day with meals. 60 tablet 0    • cyclobenzaprine (FLEXERIL) 10 MG tablet Take 1 tablet by mouth 3 (three) times a day as needed for muscle spasms. 21 tablet 0    • HYDROcodone-acetaminophen (NORCO) 7.5-325 MG per tablet Take 1 tablet by mouth every 6 (six) hours as needed for moderate pain (4-6). 14 tablet 0        Current Meds  No current facility-administered medications on file prior to encounter.      Current Outpatient Prescriptions on File Prior to Encounter   Medication Sig Dispense Refill   • amLODIPine (NORVASC) 10 MG tablet Take 1 tablet by mouth daily. 30 tablet 0   • carvedilol (COREG) 6.25 MG tablet Take 1 tablet by mouth 2 (two) times a day with meals. 60 tablet 0   • cyclobenzaprine (FLEXERIL) 10 MG tablet Take 1 tablet by mouth 3 (three) times a day as needed for muscle spasms. 21 tablet 0   • HYDROcodone-acetaminophen (NORCO) 7.5-325 MG  per tablet Take 1 tablet by mouth every 6 (six) hours as needed for moderate pain (4-6). 14 tablet 0         Social History     Social History   • Marital status: Single     Spouse name: N/A   • Number of children: N/A   • Years of education: N/A     Occupational History   • Not on file.     Social History Main Topics   • Smoking status: Heavy Tobacco Smoker     Packs/day: 1.00   • Smokeless tobacco: Not on file   • Alcohol use Yes      Comment: occ   • Drug use: No   • Sexual activity: No     Other Topics Concern   • Not on file     Social History Narrative     History reviewed. No pertinent family history.      REVIEW OF SYSTEMS:   12 point ROS was performed and is negative except as outlined in HPI     REVIEW OF SYSTEMS:   CONSTITUTIONAL: No weight loss, fever, chills, weakness or fatigue.   HEENT: Eyes: No visual loss, blurred vision, double vision or yellow sclerae. Ears, Nose, Throat: No hearing loss, sneezing, congestion, runny nose or sore throat.   SKIN: No rash or itching.     RESPIRATORY: No shortness of breath, hemoptysis, cough or sputum.   GASTROINTESTINAL: No anorexia, nausea, vomiting or diarrhea. No abdominal pain, bright red blood per rectum or melena.  GENITOURINARY: No burning on urination, hematuria or increased frequency.  NEUROLOGICAL: No headache, dizziness, syncope, paralysis, ataxia, numbness or tingling in the extremities. No change in bowel or bladder control.   MUSCULOSKELETAL: No muscle, back pain, joint pain or stiffness.   HEMATOLOGIC: No anemia, bleeding or bruising.   LYMPHATICS: No enlarged nodes. No history of splenectomy.   PSYCHIATRIC: No history of depression, anxiety, hallucinations.   ENDOCRINOLOGIC: No reports of sweating, cold or heat intolerance. No polyuria or polydipsia.       Objective:     Vitals:    12/17/17 1901 12/17/17 1906 12/17/17 1910 12/17/17 1915   BP:       BP Location:       Patient Position:       Pulse:       Resp: 20 18 20 16   Temp:       TempSrc:      "  SpO2:       Weight:       Height:         Body mass index is 31.01 kg/(m^2).  Flowsheet Rows         First Filed Value    Admission Height  175.3 cm (69\") Documented at 12/17/2017 1813    Admission Weight  95.3 kg (210 lb) Documented at 12/17/2017 1813          General Appearance:    Alert, cooperative, in no acute distress   Head:    Normocephalic, without obvious abnormality, atraumatic   Eyes:            Lids and lashes normal, conjunctivae and sclerae normal, no   icterus, no pallor, corneas clear, PERRLA   Ears:    Ears appear intact with no abnormalities noted   Throat:   No oral lesions, no thrush, oral mucosa moist   Neck:   No adenopathy, supple, trachea midline, no thyromegaly, no   carotid bruit, no JVD   Back:     No kyphosis present, no scoliosis present, no skin lesions, erythema or scars, no tenderness to percussion or palpation, range of motion normal   Lungs:     Clear to auscultation,respirations regular, even and unlabored    Heart:    Regular rhythm and normal rate, normal S1 and S2, no murmur, no gallop, no rub, no click   Chest Wall:    No abnormalities observed   Abdomen:     Normal bowel sounds, no masses, no organomegaly, soft        non-tender, non-distended, no guarding, no rebound  tenderness   Extremities:   Moves all extremities well, no edema, no cyanosis, no redness   Pulses:   Pulses palpable and equal bilaterally. Normal radial, carotid, femoral, dorsalis pedis and posterior tibial pulses bilaterally. Normal abdominal aorta   Skin:   No bleeding, bruising or rash   Lymph nodes:   No palpable adenopathy   sychiatric:   Alert and oented x 3, normal mood and affect.   Lab Review:      Results from last 7 days  Lab Units 12/17/17 1810   SODIUM mmol/L 140   POTASSIUM mmol/L 4.4   CHLORIDE mmol/L 101   CO2 mmol/L 24.0   BUN mg/dL 18   CREATININE mg/dL 1.24   CALCIUM mg/dL 10.2   BILIRUBIN mg/dL 0.2   ALK PHOS U/L 76   ALT (SGPT) U/L 39   AST (SGOT) U/L 23   GLUCOSE mg/dL 122* "       Results from last 7 days  Lab Units 12/17/17  1810   TROPONIN T ng/mL <0.010         Results from last 7 days  Lab Units 12/17/17  1810   WBC 10*3/mm3 11.63*   HEMOGLOBIN g/dL 13.9   HEMATOCRIT % 42.4   PLATELETS 10*3/mm3 226       Results from last 7 days  Lab Units 12/17/17  1810   INR  1.03       Results from last 7 days  Lab Units 12/17/17  1810   MAGNESIUM mg/dL 2.1         I personally viewed and interpreted the patient's EKG/Telemetry data    Assessment:    Acute inferior STEMI successfully revascularized.  He is now stable.  Plan to CCU for routine post-MI care.    Nick Sahu MD  12/17/17     Electronically signed by Nick Sahu MD at 12/17/2017  7:31 PM           Emergency Department Notes      Francesca Morales RN at 12/17/2017  5:45 PM          PT called for chest pain. Ems gave pt Asprin and nitro.      Electronically signed by Francesca Morales RN at 12/17/2017  6:26 PM      Faisal Marcial MD at 12/17/2017  5:59 PM      Procedure Orders:    1. Critical Care [[id number]] ordered by Faisal Marcial MD at 12/17/17 1842                 EMERGENCY DEPARTMENT ENCOUNTER    CHIEF COMPLAINT  Chief Complaint: chest pain  History given by: EMS, patient  History limited by: nothing  Room Number: Pool/CATH  PMD: No Known Provider      HPI:  Pt is a 42 y.o. male who presents complaining of substernal chest pain that started around 16:30. Pt states the pain radiated into his BUE at onset but its not at this time. Pt also c/o diaphoresis and mild shortness of breath and denies N/V. Per EMS, Pt has been given 325 asa and nitro x1. Pt denies heart hx but does states hx of hypertension and smoking.     Duration:  1.5 hours  Onset: sudden  Timing: constant  Location: substernal  Radiation: BUE  Intensity/Severity: severe  Progression: unchanged  Associated Symptoms: diaphoresis, shortness of breath  Previous Episodes: none  Treatment before arrival: asa 325 mg and nitro x1    PAST MEDICAL HISTORY  Active Ambulatory Problems      Diagnosis Date Noted   • No Active Ambulatory Problems     Resolved Ambulatory Problems     Diagnosis Date Noted   • No Resolved Ambulatory Problems     Past Medical History:   Diagnosis Date   • Hyperlipidemia    • Hypertension        PAST SURGICAL HISTORY  Past Surgical History:   Procedure Laterality Date   • APPENDECTOMY         FAMILY HISTORY  History reviewed. No pertinent family history.    SOCIAL HISTORY  Social History     Social History   • Marital status: Single     Spouse name: N/A   • Number of children: N/A   • Years of education: N/A     Occupational History   • Not on file.     Social History Main Topics   • Smoking status: Heavy Tobacco Smoker     Packs/day: 1.00   • Smokeless tobacco: Not on file   • Alcohol use Yes      Comment: occ   • Drug use: No   • Sexual activity: No     Other Topics Concern   • Not on file     Social History Narrative       ALLERGIES  Review of patient's allergies indicates no known allergies.    REVIEW OF SYSTEMS  Review of Systems   Constitutional: Positive for diaphoresis. Negative for chills and fever.   HENT: Negative.  Negative for sore throat.    Eyes: Negative.    Respiratory: Positive for shortness of breath. Negative for cough.    Cardiovascular: Positive for chest pain.   Gastrointestinal: Negative.    Genitourinary: Negative.  Negative for dysuria.   Musculoskeletal: Negative.  Negative for back pain.   Skin: Negative.  Negative for rash.   Neurological: Negative.  Negative for headaches.       PHYSICAL EXAM  ED Triage Vitals   Temp Heart Rate Resp BP SpO2   12/17/17 1823 12/17/17 1805 12/17/17 1819 12/17/17 1818 12/17/17 1805   97.9 °F (36.6 °C) 96 20 185/130 99 %      Temp src Heart Rate Source Patient Position BP Location FiO2 (%)   12/17/17 1823 12/17/17 1821 12/17/17 1818 12/17/17 1818 --   Tympanic Monitor Sitting Right arm          Physical Exam   Constitutional: He is oriented to person, place, and time. He appears distressed (moderate).   HENT:    Head: Normocephalic and atraumatic.   Mouth/Throat: Mucous membranes are dry.   Eyes: EOM are normal. Pupils are equal, round, and reactive to light.   Neck: Normal range of motion. Neck supple.   Cardiovascular: Normal rate, regular rhythm and normal heart sounds.    /125   Pulmonary/Chest: Effort normal and breath sounds normal. No respiratory distress.   Abdominal: Soft. There is no tenderness. There is no rebound and no guarding.   Musculoskeletal: Normal range of motion. He exhibits no edema.   Neurological: He is alert and oriented to person, place, and time. He has normal sensation and normal strength.   Skin: Skin is warm and dry.   Psychiatric: Mood and affect normal.   Nursing note and vitals reviewed.      LAB RESULTS  Lab Results (last 24 hours)     Procedure Component Value Units Date/Time    CBC & Differential [[id number]] Collected:  12/17/17 1810    Specimen:  Blood Updated:  12/17/17 1824    Narrative:       The following orders were created for panel order CBC & Differential.  Procedure                               Abnormality         Status                     ---------                               -----------         ------                     CBC Auto Differential[[id number]]        Abnormal            Final result                 Please view results for these tests on the individual orders.    Troponin [[id number]]  (Normal) Collected:  12/17/17 1810    Specimen:  Blood Updated:  12/17/17 1840     Troponin T <0.010 ng/mL     Narrative:       Troponin T Reference Ranges:  Less than 0.03 ng/mL:    Negative for AMI  0.03 to 0.09 ng/mL:      Indeterminant for AMI  Greater than 0.09 ng/mL: Positive for AMI    Comprehensive Metabolic Panel [[id number]]  (Abnormal) Collected:  12/17/17 1810    Specimen:  Blood Updated:  12/17/17 1840     Glucose 122 (H) mg/dL      BUN 18 mg/dL      Creatinine 1.24 mg/dL      Sodium 140 mmol/L      Potassium 4.4 mmol/L      Chloride 101 mmol/L      CO2 24.0  mmol/L      Calcium 10.2 mg/dL      Total Protein 7.3 g/dL      Albumin 4.30 g/dL      ALT (SGPT) 39 U/L      AST (SGOT) 23 U/L      Alkaline Phosphatase 76 U/L      Total Bilirubin 0.2 mg/dL      eGFR Non African Amer 64 mL/min/1.73      Globulin 3.0 gm/dL      A/G Ratio 1.4 g/dL      BUN/Creatinine Ratio 14.5     Anion Gap 15.0 mmol/L     Magnesium [[id number]]  (Normal) Collected:  12/17/17 1810    Specimen:  Blood Updated:  12/17/17 1840     Magnesium 2.1 mg/dL     Protime-INR [[id number]]  (Normal) Collected:  12/17/17 1810    Specimen:  Blood Updated:  12/17/17 1837     Protime 13.1 Seconds      INR 1.03    CBC Auto Differential [[id number]]  (Abnormal) Collected:  12/17/17 1810    Specimen:  Blood Updated:  12/17/17 1824     WBC 11.63 (H) 10*3/mm3      RBC 4.59 (L) 10*6/mm3      Hemoglobin 13.9 g/dL      Hematocrit 42.4 %      MCV 92.4 fL      MCH 30.3 pg      MCHC 32.8 g/dL      RDW 14.2 %      RDW-SD 47.7 fl      MPV 11.5 fL      Platelets 226 10*3/mm3      Neutrophil % 58.0 %      Lymphocyte % 33.2 %      Monocyte % 7.6 %      Eosinophil % 0.6 %      Basophil % 0.3 %      Immature Grans % 0.3 %      Neutrophils, Absolute 6.75 10*3/mm3      Lymphocytes, Absolute 3.86 10*3/mm3      Monocytes, Absolute 0.88 10*3/mm3      Eosinophils, Absolute 0.07 10*3/mm3      Basophils, Absolute 0.03 10*3/mm3      Immature Grans, Absolute 0.04 (H) 10*3/mm3           I ordered the above labs and reviewed the results    RADIOLOGY  XR Chest 1 View    (Results Pending)        I ordered the above noted radiological studies. Interpreted by radiologist. . Reviewed by me in PACS.       PROCEDURES  Critical Care  Performed by: TAYLOR RIVERO  Authorized by: TAYLOR RIVERO     Critical care provider statement:     Critical care time (minutes):  30    Critical care time was exclusive of:  Separately billable procedures and treating other patients    Critical care was necessary to treat or prevent imminent or life-threatening  deterioration of the following conditions:  Cardiac failure    Critical care was time spent personally by me on the following activities:  Blood draw for specimens, development of treatment plan with patient or surrogate, discussions with consultants, evaluation of patient's response to treatment, examination of patient, obtaining history from patient or surrogate, ordering and review of laboratory studies, ordering and review of radiographic studies, ordering and performing treatments and interventions, pulse oximetry, re-evaluation of patient's condition and review of old charts        EKG    EKG time: 18:06  Rhythm/Rate: Sinus tachycardia, rate 101  ST elvation in infeior leads with ST depression anteriolateral leads, consistent with acute inferior posterior MI      Changed compared to prior in September 2016    Interpreted Contemporaneously by me.  Independently viewed by me      PROGRESS AND CONSULTS  ED Course     5:59 PM  EMS called ahead with STEMI seen on EKG. Priscila charge RN has spoken with Dr. Sahu (cardiologist) and has activated the cath lab.     6:03 PM  Pt in V-fib. Pt was shocked x1 with return to NSR and pulse.      6:05 PM  Ordered CBC. Ordered amiodarone bolus and drip.     6:08 PM  Discussed with Pt that he is having a STEMI and plan to take him to the cath lab for further treatment.     6:15 PM  Ordered lopressor.     6:28 PM  Pt rechecked and is resting comfortably. BP- (!) 153/118 HR- 92 Temp- 97.9 °F (36.6 °C) (Tympanic) O2 sat- 99%. Pt/family verbalized understanding and agree with plan to take Pt to the cath lab for intervention. All questions and concerns addressed at this time.     6:31 PM  Pt is stable still with mild substernal chest pain. Pt taken to cath lab.         MEDICAL DECISION MAKING  Results were reviewed/discussed with the patient and they were also made aware of online access. Pt also made aware that some labs, such as cultures, will not be resulted during ER visit and follow  up with PMD is necessary.     MDM  Number of Diagnoses or Management Options  ST elevation myocardial infarction (STEMI), unspecified artery:   Ventricular fibrillation:      Amount and/or Complexity of Data Reviewed  Clinical lab tests: ordered and reviewed  Tests in the radiology section of CPT®:  ordered and reviewed  Tests in the medicine section of CPT®:  ordered and reviewed (See procedure note)           DIAGNOSIS  Final diagnoses:   ST elevation myocardial infarction (STEMI), unspecified artery   Ventricular fibrillation       DISPOSITION  Send to cath lab    Latest Documented Vital Signs:  As of 6:43 PM  BP- (!) 144/110 HR- 94 Temp- 97.9 °F (36.6 °C) (Tympanic) O2 sat- 100%    --  Documentation assistance provided by mukul Tavarez for Dr. Marcial.  Information recorded by the scribe was done at my direction and has been verified and validated by me.          Selin Tavarez  12/17/17 1843       Faisal Marcial MD  12/17/17 2035       Electronically signed by Faisal Marcial MD at 12/17/2017  8:35 PM      Mireya Ford RN at 12/17/2017  6:02 PM          Pulse palpable present after shocked, pt alert , pt normal sinus rhythm.     Mireya Ford RN  12/17/17 1812       Electronically signed by Mireya Ford RN at 12/17/2017  6:12 PM      Mireya Ford RN at 12/17/2017  6:02 PM          Pt on vf, shocked 120 J. Charge nurse Priscila in room, MD in room.     Mrieya Ford RN  12/17/17 1804       Electronically signed by Mireya Ford RN at 12/17/2017  6:04 PM      Mireya Ford RN at 12/17/2017  6:05 PM          Pt placed on non rebreather.     Mireya Ford RN  12/17/17 1805       Electronically signed by Mireya Ford RN at 12/17/2017  6:05 PM      Mireya Ford RN at 12/17/2017  6:10 PM          Blood obtained and sent by Antonia BALDWIN.     Mireya Ford RN  12/17/17 1810       Electronically signed by Mireya Ford RN at 12/17/2017  6:10 PM      Mireya  "NICOLASA Ford at 12/17/2017  6:14 PM          Bedside report given to primary nurse, Francesca BALDWIN.   MD currently in room with pt.     Mireya Ford RN  12/17/17 1815       Electronically signed by Mireya Ford RN at 12/17/2017  6:15 PM        Vital Signs (last 24 hours)       12/17 0700  -  12/18 0659 12/18 0700  -  12/18 0952   Most Recent    Temp (°F)   97.9      97.5     97.5 (36.4)    Heart Rate 55 -  106    68 -  78     68    Resp 16 -  20       16    BP 95/69 -  (!) 185/130    110/73 -  113/93     113/93    SpO2 (%) 95 -  100    97 -  99     99          Lines, Drains & Airways    Active LDAs     Name:   Placement date:   Placement time:   Site:   Days:    Peripheral IV Line - Single Lumen 12/17/17 median cubital vein (antecubital fossa), right 18 gauge  12/17/17          1    Peripheral IV Line - Single Lumen 12/17/17 1816 metacarpal vein (top of hand), right 20 gauge  12/17/17    1816      less than 1    Peripheral IV Line - Single Lumen 12/17/17 1822 median cubital vein (antecubital fossa), left 20 gauge  12/17/17    1822      less than 1         Inactive LDAs     Name:   Placement date:   Placement time:   Removal date:   Removal time:   Site:   Days:    [REMOVED] Arterial Sheath 6 Fr. Right Radial          12/17/17    1906    [facility] Medications (all)       Dose Frequency Start End    aluminum-magnesium hydroxide-simethicone (MAALOX MAX) 400-400-40 MG/5ML suspension 30 mL 30 mL Every 2 Hours PRN 12/18/2017     Sig - Route: Take 30 mL by mouth Every 2 (Two) Hours As Needed for Indigestion or Heartburn. - Oral    amiodarone (NEXTERONE) 360 mg/200 mL (1.8 mg/mL) infusion 0.5 mg/min Continuous 12/17/2017     Sig - Route: Infuse 0.5 mg/min into a venous catheter Continuous. - Intravenous    Linked Group 1:  \"Followed by\" Linked Group Details        amiodarone in dextrose 5% (NEXTERONE) loading dose 150mg/100mL 150 mg Once 12/17/2017 12/17/2017    Sig - Route: Infuse 100 mL into " "a venous catheter 1 (One) Time. - Intravenous    Linked Group 1:  \"Followed by\" Linked Group Details        aspirin chewable tablet 81 mg 81 mg Daily 12/18/2017     Sig - Route: Chew 1 tablet Daily. - Oral    clopidogrel (PLAVIX) tablet 600 mg 600 mg Once 12/17/2017 12/17/2017    Sig - Route: Take 8 tablets by mouth 1 (One) Time. - Oral    clopidogrel (PLAVIX) tablet 75 mg 75 mg Daily 12/18/2017     Sig - Route: Take 1 tablet by mouth Daily. - Oral    cyclobenzaprine (FLEXERIL) tablet 10 mg 10 mg 3 Times Daily PRN 12/17/2017     Sig - Route: Take 1 tablet by mouth 3 (Three) Times a Day As Needed for Muscle Spasms. - Oral    heparin (porcine) 5000 UNIT/ML injection 4,000 Units 4,000 Units Once 12/17/2017 12/17/2017    Sig - Route: Infuse 0.8 mL into a venous catheter 1 (One) Time. - Intravenous    HYDROcodone-acetaminophen (NORCO) 7.5-325 MG per tablet 1 tablet 1 tablet Every 6 Hours PRN 12/17/2017     Sig - Route: Take 1 tablet by mouth Every 6 (Six) Hours As Needed for Moderate Pain . - Oral    LORazepam (ATIVAN) tablet 0.5 mg 0.5 mg Every 6 Hours PRN 12/17/2017 12/27/2017    Sig - Route: Take 1 tablet by mouth Every 6 (Six) Hours As Needed for Anxiety or Withdrawal. - Oral    LORazepam (ATIVAN) tablet 1 mg 1 mg Every 6 Hours PRN 12/17/2017 12/27/2017    Sig - Route: Take 1 tablet by mouth Every 6 (Six) Hours As Needed for Anxiety or Withdrawal. - Oral    metoprolol tartrate (LOPRESSOR) injection 5 mg 5 mg Once 12/17/2017 12/17/2017    Sig - Route: Infuse 5 mL into a venous catheter 1 (One) Time. - Intravenous    nitroglycerin 50 mg/250 mL (0.2 mg/mL) infusion 10-50 mcg/min Titrated 12/17/2017     Sig - Route: Infuse 10-50 mcg/min into a venous catheter Dose Adjusted By Provider As Needed. - Intravenous    pantoprazole (PROTONIX) EC tablet 40 mg 40 mg Every Early Morning 12/18/2017     Sig - Route: Take 1 tablet by mouth Every Morning. - Oral    potassium chloride (KLOR-CON) packet 40 mEq 40 mEq As Needed " 12/18/2017     Sig - Route: Take 40 mEq by mouth As Needed (potassium replacement, see admin instructions). - Oral    potassium chloride (MICRO-K) CR capsule 40 mEq 40 mEq As Needed 12/18/2017     Sig - Route: Take 4 capsules by mouth As Needed (potassium replacement.  see admin instructions). - Oral    sodium chloride 0.9 % bolus 1,000 mL 1,000 mL Once 12/17/2017 12/17/2017    Sig - Route: Infuse 1,000 mL into a venous catheter 1 (One) Time. - Intravenous    sodium chloride 0.9 % flush 10 mL 10 mL As Needed 12/17/2017     Sig - Route: Infuse 10 mL into a venous catheter As Needed for Line Care. - Intravenous    sodium chloride 0.9 % infusion 100 mL/hr Continuous 12/17/2017 12/18/2017    Sig - Route: Infuse 100 mL/hr into a venous catheter Continuous. - Intravenous    atropine injection (Discontinued)  As Needed 12/17/2017 12/17/2017    Sig: As Needed.    Reason for Discontinue: Patient Discharge    BH (CUPID ONLY) ADENOSINE 6 MG/100ML MIXTURE injection (Discontinued)  As Needed 12/17/2017 12/17/2017    Sig: As Needed.    Reason for Discontinue: Patient Discharge    fentaNYL citrate (PF) (SUBLIMAZE) injection (Discontinued)  As Needed 12/17/2017 12/17/2017    Sig: As Needed.    Reason for Discontinue: Patient Discharge    heparin (porcine) injection (Discontinued)  As Needed 12/17/2017 12/17/2017    Sig: As Needed.    Reason for Discontinue: Patient Discharge    iopamidol (ISOVUE-370) 76 % injection (Discontinued)  As Needed 12/17/2017 12/17/2017    Sig: As Needed.    Reason for Discontinue: Patient Discharge    lidocaine (XYLOCAINE) 2% injection (Discontinued)  As Needed 12/17/2017 12/17/2017    Sig: As Needed.    Reason for Discontinue: Patient Discharge    midazolam (VERSED) injection (Discontinued)  As Needed 12/17/2017 12/17/2017    Sig: As Needed.    Reason for Discontinue: Patient Discharge    nitroglycerin (TRIDIL) injection (Discontinued)  As Needed 12/17/2017 12/17/2017    Sig: As Needed.    Reason for  Discontinue: Patient Discharge    phenylephrine (CATRACHITO-SYNEPHRINE) injection (Discontinued)  As Needed 12/17/2017 12/17/2017    Sig: As Needed.    Reason for Discontinue: Patient Discharge    sodium chloride 0.9 % infusion (Discontinued)  Continuous PRN 12/17/2017 12/17/2017    Sig: Continuous As Needed.    Reason for Discontinue: Patient Discharge    verapamil (ISOPTIN) 500 mcg, nitroglycerin (TRIDIL) 200 mcg, heparin (porcine) 3,000 Units radial artery injection (Discontinued)  As Needed 12/17/2017 12/17/2017    Sig: As Needed.    Reason for Discontinue: Patient Discharge          Orders (last 24 hrs)     Start     Ordered    12/18/17 0900  clopidogrel (PLAVIX) tablet 75 mg  Daily      12/17/17 2025 12/18/17 0900  aspirin chewable tablet 81 mg  Daily      12/17/17 2025 12/18/17 0805  potassium chloride (MICRO-K) CR capsule 40 mEq  As Needed      12/18/17 0806    12/18/17 0805  potassium chloride (KLOR-CON) packet 40 mEq  As Needed      12/18/17 0806    12/18/17 0726  POC Glucose Once  Once      12/18/17 0725    12/18/17 0700  ECG 12 Lead  Once      12/17/17 2025 12/18/17 0649  POC Activated Clotting Time  Once      12/18/17 0648    12/18/17 0600  Troponin  Morning Draw      12/17/17 2025 12/18/17 0600  CBC (No Diff)  Morning Draw      12/17/17 2025 12/18/17 0600  Basic Metabolic Panel  Morning Draw      12/17/17 2025 12/18/17 0600  Hemoglobin A1c  Morning Draw      12/17/17 2025 12/18/17 0600  Lipid Panel  Morning Draw     Comments:  Fasting    12/17/17 2025 12/18/17 0115  pantoprazole (PROTONIX) EC tablet 40 mg  Every Early Morning      12/18/17 0032    12/18/17 0033  aluminum-magnesium hydroxide-simethicone (MAALOX MAX) 400-400-40 MG/5ML suspension 30 mL  Every 2 Hours PRN      12/18/17 0034    12/18/17 0000  Strict intake and output  Every 4 Hours      12/17/17 2025 12/17/17 2330  LORazepam (ATIVAN) tablet 1 mg  Every 6 Hours PRN      12/17/17 2330    12/17/17 2330  LORazepam (ATIVAN)  tablet 0.5 mg  Every 6 Hours PRN      12/17/17 2330    12/17/17 2200  Incentive Spirometry  Every 2 Hours While Awake      12/17/17 2025 12/17/17 2100  sodium chloride 0.9 % infusion  Continuous      12/17/17 2025 12/17/17 2032  POC Glucose Once  Once      12/17/17 2031 12/17/17 2026  Full Code  Continuous      12/17/17 2025 12/17/17 2026  Obtain STAT EKG during chest pain. Notify MD of any change in rhythm, ST segments or complaints of chest pain.  Until Discontinued      12/17/17 2025 12/17/17 2026  Encourage fluids  Until Discontinued      12/17/17 2025 12/17/17 2026  Verify Discontinuation of enoxaparin (LOVENOX) and / or heparin  Once      12/17/17 2025 12/17/17 2026  Notify MD if platelet count is less than 100,000, is less than 1/2 baseline, or if Hgb drops by more than 3mg/dl.  Until Discontinued      12/17/17 2025 12/17/17 2026  Notify MD of hypotension (SBP less than 95), bleeding, or dysrythmia and follow Sheath Removal Policy if needed.  Until Discontinued      12/17/17 2025 12/17/17 2026  Oxygen Therapy- Nasal Cannula; Titrate for SPO2: equal to or greater than, 92%, per policy  Continuous      12/17/17 2025 12/17/17 2026  Continuous Pulse Oximetry  Continuous      12/17/17 2025 12/17/17 2026  Diet Regular; Cardiac  Diet Effective Now      12/17/17 2025 12/17/17 2026  Advance diet as tolerated  Until Discontinued      12/17/17 2025 12/17/17 2026  Cardiac rehab evaluation  Once     Provider:  (Not yet assigned)    12/17/17 2025 12/17/17 2026  ECG 12 Lead  STAT      12/17/17 2025 12/17/17 2026  Echocardiogram 2D complete  Once      12/17/17 2025 12/17/17 2026  Remove Radial Pressure Device / Dressing  Once      12/17/17 2025 12/17/17 2026  Vital Signs & Reverse Perfecto's Test (While Radial Compression Device in Place)  Continuous     Comments:  Every 15 Minutes x4, Every 30 Minutes x4, & Every 1 Hour x2    12/17/17 2025 12/17/17 2026  Keep Affected Arm  Straight & Elevated  Continuous      12/17/17 2025 12/17/17 2026  Activity As Tolerated  Until Discontinued      12/17/17 2025 12/17/17 1913  Inpatient Admission  Once      12/17/17 1916 12/17/17 1912  HYDROcodone-acetaminophen (NORCO) 7.5-325 MG per tablet 1 tablet  Every 6 Hours PRN      12/17/17 1916 12/17/17 1912  cyclobenzaprine (FLEXERIL) tablet 10 mg  3 Times Daily PRN      12/17/17 1916 12/17/17 1911  iopamidol (ISOVUE-370) 76 % injection  As Needed,   Status:  Discontinued      12/17/17 1912 12/17/17 1859  nitroglycerin (TRIDIL) injection  As Needed,   Status:  Discontinued      12/17/17 1859 12/17/17 1859  BH (CUPID ONLY) ADENOSINE 6 MG/100ML MIXTURE injection  As Needed,   Status:  Discontinued      12/17/17 1859 12/17/17 1849  atropine injection  As Needed,   Status:  Discontinued      12/17/17 1849 12/17/17 1848  phenylephrine (CATRACHITO-SYNEPHRINE) injection  As Needed,   Status:  Discontinued      12/17/17 1848 12/17/17 1845  heparin (porcine) injection  As Needed,   Status:  Discontinued      12/17/17 1845 12/17/17 1845  fentaNYL citrate (PF) (SUBLIMAZE) injection  As Needed,   Status:  Discontinued      12/17/17 1845 12/17/17 1844  midazolam (VERSED) injection  As Needed,   Status:  Discontinued      12/17/17 1845 12/17/17 1843  Critical Care  Once     Comments:  This order was created via procedure documentation    12/17/17 1842 12/17/17 1842  verapamil (ISOPTIN) 500 mcg, nitroglycerin (TRIDIL) 200 mcg, heparin (porcine) 3,000 Units radial artery injection  As Needed,   Status:  Discontinued      12/17/17 1842 12/17/17 1842  lidocaine (XYLOCAINE) 2% injection  As Needed,   Status:  Discontinued      12/17/17 1842 12/17/17 1836  sodium chloride 0.9 % infusion  Continuous PRN,   Status:  Discontinued      12/17/17 1921 12/17/17 1823  sodium chloride 0.9 % bolus 1,000 mL  Once      12/17/17 1821 12/17/17 1817  metoprolol tartrate (LOPRESSOR) injection 5  mg  Once      12/17/17 1815    12/17/17 1815  amiodarone (NEXTERONE) 360 mg/200 mL (1.8 mg/mL) infusion  Continuous      12/17/17 1805    12/17/17 1811  Cardiac Catheterization/Vascular Study  Once      12/17/17 1810    12/17/17 1807  nitroglycerin 50 mg/250 mL (0.2 mg/mL) infusion  Titrated      12/17/17 1805    12/17/17 1807  clopidogrel (PLAVIX) tablet 600 mg  Once      12/17/17 1805    12/17/17 1807  heparin (porcine) 5000 UNIT/ML injection 4,000 Units  Once      12/17/17 1805    12/17/17 1807  amiodarone in dextrose 5% (NEXTERONE) loading dose 150mg/100mL  Once      12/17/17 1805    12/17/17 1805  ECG 12 Lead  Once      12/17/17 1804    12/17/17 1803  Initiate Department's Acute STEMI Process  Once      12/17/17 1805    12/17/17 1803  Consult Interventional Cardiology and Notify Cath Lab  Once     Provider:  Nick Sahu MD    12/17/17 1805    12/17/17 1803  NPO Diet  Diet Effective Now,   Status:  Canceled      12/17/17 1805    12/17/17 1803  Undress and Gown  Once      12/17/17 1805    12/17/17 1803  Cardiac monitoring  Per Hospital Policy      12/17/17 1805    12/17/17 1803  Continuous Pulse Oximetry  Per Hospital Policy,   Status:  Canceled      12/17/17 1805    12/17/17 1803  Vital Signs  Per Hospital Policy     Comments:  Every 5-15 mninutes    12/17/17 1805    12/17/17 1803  ECG 12 Lead  Once      12/17/17 1805    12/17/17 1803  XR Chest 1 View  1 Time Imaging      12/17/17 1805    12/17/17 1803  Insert peripheral IV - avoid Rt radial if possible  Once      12/17/17 1805    12/17/17 1803  Insert 2nd peripheral IV - avoid Rt radial if possible  Once      12/17/17 1805    12/17/17 1803  [place] Draw  Once      12/17/17 1805    12/17/17 1803  CBC & Differential  Once      12/17/17 1805    12/17/17 1803  Troponin  Once      12/17/17 1805    12/17/17 1803  Comprehensive Metabolic Panel  Once      12/17/17 1805    12/17/17 1803  Magnesium  Once      12/17/17 1805    12/17/17 1803  Protime-INR  Once      12/17/17  1805    12/17/17 1803  Light Blue Top  PROCEDURE ONCE      12/17/17 1805    12/17/17 1803  Green Top (Gel)  PROCEDURE ONCE      12/17/17 1805    12/17/17 1803  Lavender Top  PROCEDURE ONCE      12/17/17 1805    12/17/17 1803  Gold Top - SST  PROCEDURE ONCE      12/17/17 1805    12/17/17 1803  CBC Auto Differential  PROCEDURE ONCE      12/17/17 1805 12/17/17 1802  sodium chloride 0.9 % flush 10 mL  As Needed      12/17/17 1805    Unscheduled  Oxygen Therapy- Nasal Cannula; 2 LPM; Titrate for SPO2: equal to or greater than, 92%  As Needed      12/17/17 1805    Unscheduled  Up in Chair  As Needed      12/17/17 2025    Unscheduled  Vital Signs  As Needed      12/17/17 2025    Unscheduled  Check distal extremity for warmth, color, sensation and pulses with each vital sign and site check.  As Needed      12/17/17 2025    Unscheduled  Change site dressing  As Needed      12/17/17 2025    Unscheduled  Magnesium  As Needed      12/18/17 0806    Unscheduled  Potassium  As Needed      12/18/17 0806

## 2017-12-18 NOTE — DISCHARGE INSTR - ACTIVITY
Do not lift, push or pull greater than 5 lbs for at least 5 days.   You may shower, but do not submerge your incisions (i.e., no baths, pools, hot tubs, etc.) for at least one week.   Clean your incision daily in the shower with Dial or Ivory soap. Do not put any additional lotions, creams, antibiotics, or any other substance on your incision. Pat dry.   If your site starts bleeding, hold pressure for 25 minutes with your arm over your chest. If it continues to bleed after 25 minutes then proceed to the ER.

## 2017-12-18 NOTE — PLAN OF CARE
Problem: Patient Care Overview (Adult)  Goal: Plan of Care Review  Outcome: Ongoing (interventions implemented as appropriate)    12/18/17 0512   Coping/Psychosocial Response Interventions   Plan Of Care Reviewed With patient   Patient Care Overview   Progress progress toward functional goals as expected   Outcome Evaluation   Outcome Summary/Follow up Plan pt co indigestion type pain with no changes on monitor. started on po ativan for ? ETOH withdrawal drinks beer routine basis ciwa last completed at 0400 was a 4 rt radial site look good pink warm dry pt denies deficit with sensation or movement chronic back pain lortab given twice remains on amiodarone drip at 0.5          Problem: Cardiac Catheterization with/without PCI (Adult)  Goal: Signs and Symptoms of Listed Potential Problems Will be Absent or Manageable (Cardiac Catheterization with/without PCI)  Outcome: Ongoing (interventions implemented as appropriate)    Problem: Pain, Acute (Adult)  Goal: Identify Related Risk Factors and Signs and Symptoms  Outcome: Ongoing (interventions implemented as appropriate)    Problem: Acute Alcohol Withdrawal Syndrome, Risk For/Actual (Adult)  Goal: Signs and Symptoms of Listed Potential Problems Will be Absent or Manageable (Acute Alcohol Withdrawal Syndrome, Risk For/Actual)  Outcome: Ongoing (interventions implemented as appropriate)    Problem: Fall Risk (Adult)  Goal: Identify Related Risk Factors and Signs and Symptoms  Outcome: Ongoing (interventions implemented as appropriate)

## 2017-12-18 NOTE — NURSING NOTE
"During routine check pt reports feeling \"a little anxious.\" request \"something to help with that.\" see CIWA attached to orders . Call to Dr. Sahu orders Ativan po .5 to 1.0 prn q 6  Hours.  "

## 2017-12-26 ENCOUNTER — OFFICE VISIT (OUTPATIENT)
Dept: CARDIOLOGY | Facility: CLINIC | Age: 42
End: 2017-12-26

## 2017-12-26 VITALS
HEIGHT: 69 IN | WEIGHT: 224.8 LBS | DIASTOLIC BLOOD PRESSURE: 102 MMHG | SYSTOLIC BLOOD PRESSURE: 142 MMHG | HEART RATE: 98 BPM | BODY MASS INDEX: 33.3 KG/M2

## 2017-12-26 DIAGNOSIS — F17.200 NICOTINE DEPENDENCE, UNCOMPLICATED, UNSPECIFIED NICOTINE PRODUCT TYPE: ICD-10-CM

## 2017-12-26 DIAGNOSIS — I21.11 ST ELEVATION MYOCARDIAL INFARCTION INVOLVING RIGHT CORONARY ARTERY (HCC): ICD-10-CM

## 2017-12-26 DIAGNOSIS — E78.5 HYPERLIPIDEMIA, UNSPECIFIED HYPERLIPIDEMIA TYPE: ICD-10-CM

## 2017-12-26 DIAGNOSIS — I25.10 CORONARY ARTERY DISEASE INVOLVING NATIVE CORONARY ARTERY OF NATIVE HEART WITHOUT ANGINA PECTORIS: Primary | ICD-10-CM

## 2017-12-26 DIAGNOSIS — I10 ESSENTIAL HYPERTENSION: ICD-10-CM

## 2017-12-26 PROCEDURE — 99214 OFFICE O/P EST MOD 30 MIN: CPT | Performed by: NURSE PRACTITIONER

## 2017-12-26 PROCEDURE — 93000 ELECTROCARDIOGRAM COMPLETE: CPT | Performed by: NURSE PRACTITIONER

## 2017-12-26 NOTE — PROGRESS NOTES
Date of Office Visit: 2017  Encounter Provider: STANLEY Betancur  Place of Service: [facility] CARDIOLOGY  Patient Name: Ruddy Griffin  :1975    Chief Complaint   Patient presents with   • Coronary Artery Disease   :     HPI: Ruddy Griffin is a 42 y.o. male who presents today for one week hospital follow-up.  Mr. Griffin is a new patient to me and his previous records have been reviewed.  He has a history of hypertension and nicotine dependence.  He has a strong family history of coronary artery disease with his father having a heart attack at age 51 and his sister at age 46.      He presented to the [facility] emergency department on 17 with complaints of chest pain and nausea.  EKG showed ST segment elevation and had an episode of ventricular fibrillation treated with a single shock.  He was emergently taken to the Cath Lab and found to have a high-grade proximal RCA lesion and acute occlusion of his distal RCA treated with thrombectomy and drug-eluting stent.  During the intervention he developed cardiogenic shock with bradycardia and was temporarily placed on amiodarone and nitroglycerin.The distal RCA received a 2.75×18 mm Alpine stent in the proximal RCA lesion at 3.5×38 mm Alpine drug-eluting stent.  His echocardiogram revealed an EF of 58%, mild mitral valve regurgitation, trace tricuspid valve regurgitation, borderline dilation of the ascending aorta measuring 3.8 cm, and hypokinesis of the basal and mid inferior wall segments.  He was started on aspirin, atorvastatin, carvedilol, and clopidogrel.    Mr. Griffin presents today for follow-up.  He has successfully quit smoking cigarettes.  He denies any further chest pain or nausea.  He further denies shortness of air, paroxysmal nocturnal dyspnea, orthopnea, cough, edema, palpitations, dizziness, or syncope.  He is tolerating all of his medications without any adverse effects.  His blood  pressure is elevated today in the office.  He is been checking his blood pressure at home which is averaging 130s over 70s with heart rates in the 80s.  He is scheduled to start cardiac rehabilitation on 1/8/18.    The following portion of the patient's history were reviewed and updated as appropriate: past medical history, past surgical history, past social history, past family history, allergies, current medications, and problem list.    Past Medical History:   Diagnosis Date   • Coronary artery disease    • Hyperlipidemia    • Hypertension    • ST elevation myocardial infarction (STEMI) 12/17/2017       Past Surgical History:   Procedure Laterality Date   • APPENDECTOMY     • CARDIAC CATHETERIZATION N/A 12/17/2017    Procedure: Left Heart Cath;  Surgeon: Nick Sahu MD;  Location: [facility] CATH INVASIVE LOCATION;  Service:    • CARDIAC CATHETERIZATION N/A 12/17/2017    Procedure: Stent LAUREN coronary;  Surgeon: Nick Sahu MD;  Location: [facility] CATH INVASIVE LOCATION;  Service:    • CARDIAC CATHETERIZATION  12/17/2017    Procedure: Percutaneous Manual Thrombectomy;  Surgeon: Nick Sahu MD;  Location: [facility] CATH INVASIVE LOCATION;  Service:    • CORONARY STENT PLACEMENT         Social History   • Marital status: Single     Spouse name: N/A   • Number of children: N/A   • Years of education: N/A     Occupational History   • Not on file.     Social History Main Topics   • Smoking status: Former Smoker     Packs/day: 1.00     Years: 15.00     Quit date: 12/17/2017   • Smokeless tobacco: Never Used   • Alcohol use 5.4 - 7.2 oz/week     9 - 12 Cans of beer per week      Comment: reports 9-12 beers every three days or so    • Drug use: Yes     Special: Marijuana      Comment: occ   • Sexual activity: Defer       Family History   Problem Relation Age of Onset   • Heart disease Father    • Heart attack Father    • Heart attack Sister    • Heart disease Sister        Review of Systems   Constitution: Negative for chills,  diaphoresis, fever, malaise/fatigue, night sweats, weight gain and weight loss.   HENT: Negative for hearing loss, nosebleeds, sore throat and tinnitus.    Eyes: Negative for blurred vision, double vision, pain and visual disturbance.   Cardiovascular: Negative for chest pain, claudication, cyanosis, dyspnea on exertion, irregular heartbeat, leg swelling, near-syncope, orthopnea, palpitations, paroxysmal nocturnal dyspnea and syncope.   Respiratory: Negative for cough, hemoptysis, shortness of breath, snoring and wheezing.    Endocrine: Negative for cold intolerance, heat intolerance and polyuria.   Hematologic/Lymphatic: Negative for bleeding problem. Does not bruise/bleed easily.   Skin: Negative for color change, dry skin, flushing and itching.   Musculoskeletal: Negative for falls, joint pain, joint swelling, muscle cramps, muscle weakness and myalgias.   Gastrointestinal: Negative for abdominal pain, constipation, heartburn, melena, nausea and vomiting.   Genitourinary: Negative for dysuria and hematuria.   Neurological: Negative for excessive daytime sleepiness, dizziness, light-headedness, loss of balance, numbness, paresthesias, seizures and vertigo.   Psychiatric/Behavioral: Negative for altered mental status, depression, memory loss and substance abuse. The patient does not have insomnia and is not nervous/anxious.    Allergic/Immunologic: Negative for environmental allergies.       No Known Allergies      Current Outpatient Prescriptions:   •  aspirin 81 MG chewable tablet, Chew 1 tablet Daily., Disp: 30 tablet, Rfl: 5  •  atorvastatin (LIPITOR) 20 MG tablet, Take 1 tablet by mouth Daily., Disp: 30 tablet, Rfl: 11  •  carvedilol (COREG) 6.25 MG tablet, Take 1 tablet by mouth 2 (two) times a day with meals., Disp: 60 tablet, Rfl: 0  •  clopidogrel (PLAVIX) 75 MG tablet, Take 1 tablet by mouth Daily., Disp: 30 tablet, Rfl: 5  •  cyclobenzaprine (FLEXERIL) 10 MG tablet, Take 1 tablet by mouth 3 (three) times  "a day as needed for muscle spasms., Disp: 21 tablet, Rfl: 0  •  HYDROcodone-acetaminophen (NORCO) 7.5-325 MG per tablet, Take 1 tablet by mouth every 6 (six) hours as needed for moderate pain (4-6)., Disp: 14 tablet, Rfl: 0     Objective:     Vitals:    12/26/17 1314   BP: (!) 142/102   Pulse: 98   Weight: 102 kg (224 lb 12.8 oz)   Height: 175.3 cm (69\")     Body mass index is 33.2 kg/(m^2).    PHYSICAL EXAM:    Vitals Reviewed.   General Appearance: No acute distress, well developed and well nourished. Obese.   Eyes: Conjunctiva and lids: No erythema, swelling, or discharge. Sclera non-icteric.   HENT: Atraumatic, normocephalic. External eyes, ears, and nose normal. No hearing loss noted. Mucous membranes normal. Lips not cyanotic. Neck supple with no tenderness.  Respiratory: No signs of respiratory distress. Respiration rhythm and depth normal.   Clear to auscultation. No rales, crackles, rhonchi, or wheezing auscultated.   Cardiovascular:  Jugular Venous Pressure: Normal  Heart Rate and Rhythm: Normal, Heart Sounds: Normal S1 and S2. No S3 or S4 noted.  Murmurs: No murmurs noted. No rubs, thrills, or gallops.   Right wrist pulses palpable.  Cath site clean dry and intact with no evidence of bleeding or bruising.  Arterial Pulses: Carotid pulses normal. No carotid bruit noted. Posterior tibialis and dorsalis pedis pulses normal.   Lower Extremities: No edema noted.  Gastrointestinal:  Abdomen soft, non-distended, non-tender. Normal bowel sounds. No hepatomegaly.   Musculoskeletal: Normal movement of extremities  Skin and Nails: General appearance normal. No pallor, cyanosis, diaphoresis. Skin temperature normal. No clubbing of fingernails.   Psychiatric: Patient alert and oriented to person, place, and time. Speech and behavior appropriate. Normal mood and affect.       ECG 12 Lead  Date/Time: 12/26/2017 1:10 PM  Performed by: LINDA MATUTE  Authorized by: LINDA MATUTE   Comparison: compared with previous " ECG from 12/17/2017  Comparison to previous ECG: Sinus tachycardia with ST elevation  Rhythm: sinus rhythm  Rate: normal  BPM: 98  Conduction: conduction normal  ST Segments: ST segments normal  T Waves: T waves normal  QRS axis: normal  Q waves: II, III and aVF  Clinical impression: abnormal ECG  Comments: Q waves present in the inferior leads consistent with previous infarction              Assessment:       Diagnosis Plan   1. Coronary artery disease involving native coronary artery of native heart without angina pectoris     2. ST elevation myocardial infarction involving right coronary artery     3. Essential hypertension     4. Hyperlipidemia, unspecified hyperlipidemia type     5. Nicotine dependence, uncomplicated, unspecified nicotine product type            Plan:       1. Coronary Artery Disease  Assessment  • The patient has no angina    Plan  • Lifestyle modifications discussed include adhering to a heart healthy diet, avoidance of tobacco products, maintenance of a healthy weight, medication compliance, regular exercise and regular monitoring of cholesterol and blood pressure  • We discussed the importance of abstaining from cigarette smoking.  He inquired about the nicotine patch and recommended that he not use it.  He has not had a cigarette in 10 days.  He will start cardiac rehabilitation on 1/8/18  He will monitor his blood pressures at home and if they remain elevated above 140/90 or contact the office  Follow-up with Dr. Nick Sahu at the end of January  We discussed the importance of dual antiplatelet therapy and he verbalized understanding    Subjective - Objective  • There is a history of past MI  • There has been a previous stent procedure using LAUREN  • Current antiplatelet therapy includes aspirin 81 mg and clopidogrel 75 mg  • Denies any further angina    2.  Essential Hypertension: Blood pressure is elevated today in the office.  He has been checking at home and is averaging 130 over 70s.   He will call with consistent readings above 140/90.  3.  Hyperlipidemia: LDL goal less than 70.  He remains on statin therapy.  4.  Nicotine Dependence: I have applauded him for quitting cigarette smoking.  He was asking about the nicotine patch and since he has not had any nicotine in 10 days, I  have recommended that he not use a patch.  5.  Follow Up: He'll follow-up with Dr. Nick Sahu at the end of January.  I have asked him to call with any further concerns or questions.    As always, it has been a pleasure to participate in your patient's care.      Sincerely,         STANLEY Rdz

## 2018-01-03 ENCOUNTER — TRANSCRIBE ORDERS (OUTPATIENT)
Dept: CARDIAC REHAB | Facility: HOSPITAL | Age: 43
End: 2018-01-03

## 2018-01-03 DIAGNOSIS — Z95.5 STATUS POST INSERTION OF DRUG-ELUTING STENT INTO RIGHT CORONARY ARTERY FOR CORONARY ARTERY DISEASE: ICD-10-CM

## 2018-01-03 DIAGNOSIS — I21.19 ACUTE MI, INFERIOR WALL (HCC): Primary | ICD-10-CM

## 2018-01-06 ENCOUNTER — HOSPITAL ENCOUNTER (INPATIENT)
Facility: HOSPITAL | Age: 43
LOS: 4 days | Discharge: HOME | End: 2018-01-10
Attending: EMERGENCY MEDICINE | Admitting: INTERNAL MEDICINE

## 2018-01-06 ENCOUNTER — APPOINTMENT (OUTPATIENT)
Dept: GENERAL RADIOLOGY | Facility: HOSPITAL | Age: 43
End: 2018-01-06

## 2018-01-06 DIAGNOSIS — K92.2 UPPER GI BLEED: Primary | ICD-10-CM

## 2018-01-06 DIAGNOSIS — I95.89 HYPOTENSION DUE TO BLOOD LOSS: ICD-10-CM

## 2018-01-06 DIAGNOSIS — R58 HYPOTENSION DUE TO BLOOD LOSS: ICD-10-CM

## 2018-01-06 LAB
ABO GROUP BLD: NORMAL
ABO GROUP BLD: NORMAL
ALBUMIN SERPL-MCNC: 3.3 G/DL (ref 3.5–5.2)
ALBUMIN/GLOB SERPL: 1.3 G/DL
ALP SERPL-CCNC: 53 U/L (ref 39–117)
ALT SERPL W P-5'-P-CCNC: 52 U/L (ref 1–41)
ANION GAP SERPL CALCULATED.3IONS-SCNC: 14.9 MMOL/L
AST SERPL-CCNC: 18 U/L (ref 1–40)
BASOPHILS # BLD AUTO: 0.03 10*3/MM3 (ref 0–0.2)
BASOPHILS NFR BLD AUTO: 0.3 % (ref 0–1.5)
BILIRUB SERPL-MCNC: 0.4 MG/DL (ref 0.1–1.2)
BLD GP AB SCN SERPL QL: NEGATIVE
BUN BLD-MCNC: 45 MG/DL (ref 6–20)
BUN/CREAT SERPL: 39.8 (ref 7–25)
CALCIUM SPEC-SCNC: 8.3 MG/DL (ref 8.6–10.5)
CHLORIDE SERPL-SCNC: 106 MMOL/L (ref 98–107)
CO2 SERPL-SCNC: 20.1 MMOL/L (ref 22–29)
CREAT BLD-MCNC: 1.13 MG/DL (ref 0.76–1.27)
DEPRECATED RDW RBC AUTO: 47 FL (ref 37–54)
EOSINOPHIL # BLD AUTO: 0.03 10*3/MM3 (ref 0–0.7)
EOSINOPHIL NFR BLD AUTO: 0.3 % (ref 0.3–6.2)
ERYTHROCYTE [DISTWIDTH] IN BLOOD BY AUTOMATED COUNT: 14.2 % (ref 11.5–14.5)
GFR SERPL CREATININE-BSD FRML MDRD: 72 ML/MIN/1.73
GLOBULIN UR ELPH-MCNC: 2.6 GM/DL
GLUCOSE BLD-MCNC: 105 MG/DL (ref 65–99)
GLUCOSE BLDC GLUCOMTR-MCNC: 98 MG/DL (ref 70–130)
HCT VFR BLD AUTO: 35.5 % (ref 40.4–52.2)
HGB BLD-MCNC: 11.4 G/DL (ref 13.7–17.6)
HOLD SPECIMEN: NORMAL
HOLD SPECIMEN: NORMAL
IMM GRANULOCYTES # BLD: 0.06 10*3/MM3 (ref 0–0.03)
IMM GRANULOCYTES NFR BLD: 0.6 % (ref 0–0.5)
LYMPHOCYTES # BLD AUTO: 3.1 10*3/MM3 (ref 0.9–4.8)
LYMPHOCYTES NFR BLD AUTO: 33.2 % (ref 19.6–45.3)
MCH RBC QN AUTO: 29.4 PG (ref 27–32.7)
MCHC RBC AUTO-ENTMCNC: 32.1 G/DL (ref 32.6–36.4)
MCV RBC AUTO: 91.5 FL (ref 79.8–96.2)
MONOCYTES # BLD AUTO: 0.53 10*3/MM3 (ref 0.2–1.2)
MONOCYTES NFR BLD AUTO: 5.7 % (ref 5–12)
NEUTROPHILS # BLD AUTO: 5.59 10*3/MM3 (ref 1.9–8.1)
NEUTROPHILS NFR BLD AUTO: 59.9 % (ref 42.7–76)
NRBC BLD MANUAL-RTO: 0.2 /100 WBC (ref 0–0)
PLATELET # BLD AUTO: 247 10*3/MM3 (ref 140–500)
PMV BLD AUTO: 11.9 FL (ref 6–12)
POTASSIUM BLD-SCNC: 4 MMOL/L (ref 3.5–5.2)
PROT SERPL-MCNC: 5.9 G/DL (ref 6–8.5)
RBC # BLD AUTO: 3.88 10*6/MM3 (ref 4.6–6)
RH BLD: POSITIVE
RH BLD: POSITIVE
SODIUM BLD-SCNC: 141 MMOL/L (ref 136–145)
TROPONIN T SERPL-MCNC: <0.01 NG/ML (ref 0–0.03)
WBC NRBC COR # BLD: 9.34 10*3/MM3 (ref 4.5–10.7)
WHOLE BLOOD HOLD SPECIMEN: NORMAL
WHOLE BLOOD HOLD SPECIMEN: NORMAL

## 2018-01-06 PROCEDURE — 86920 COMPATIBILITY TEST SPIN: CPT

## 2018-01-06 PROCEDURE — 86850 RBC ANTIBODY SCREEN: CPT | Performed by: EMERGENCY MEDICINE

## 2018-01-06 PROCEDURE — 86901 BLOOD TYPING SEROLOGIC RH(D): CPT

## 2018-01-06 PROCEDURE — 25010000002 PROMETHAZINE PER 50 MG: Performed by: EMERGENCY MEDICINE

## 2018-01-06 PROCEDURE — 99285 EMERGENCY DEPT VISIT HI MDM: CPT

## 2018-01-06 PROCEDURE — 86900 BLOOD TYPING SEROLOGIC ABO: CPT | Performed by: EMERGENCY MEDICINE

## 2018-01-06 PROCEDURE — 71045 X-RAY EXAM CHEST 1 VIEW: CPT

## 2018-01-06 PROCEDURE — 93005 ELECTROCARDIOGRAM TRACING: CPT | Performed by: EMERGENCY MEDICINE

## 2018-01-06 PROCEDURE — 86901 BLOOD TYPING SEROLOGIC RH(D): CPT | Performed by: EMERGENCY MEDICINE

## 2018-01-06 PROCEDURE — 85025 COMPLETE CBC W/AUTO DIFF WBC: CPT | Performed by: EMERGENCY MEDICINE

## 2018-01-06 PROCEDURE — 86900 BLOOD TYPING SEROLOGIC ABO: CPT

## 2018-01-06 PROCEDURE — 80053 COMPREHEN METABOLIC PANEL: CPT | Performed by: EMERGENCY MEDICINE

## 2018-01-06 PROCEDURE — 82962 GLUCOSE BLOOD TEST: CPT

## 2018-01-06 PROCEDURE — 84484 ASSAY OF TROPONIN QUANT: CPT | Performed by: EMERGENCY MEDICINE

## 2018-01-06 RX ORDER — SODIUM CHLORIDE 0.9 % (FLUSH) 0.9 %
10 SYRINGE (ML) INJECTION AS NEEDED
Status: DISCONTINUED | OUTPATIENT
Start: 2018-01-06 | End: 2018-01-10 | Stop reason: HOSPADM

## 2018-01-06 RX ORDER — PROMETHAZINE HYDROCHLORIDE 25 MG/ML
6.25 INJECTION, SOLUTION INTRAMUSCULAR; INTRAVENOUS ONCE
Status: COMPLETED | OUTPATIENT
Start: 2018-01-06 | End: 2018-01-06

## 2018-01-06 RX ORDER — PANTOPRAZOLE SODIUM 40 MG/10ML
80 INJECTION, POWDER, LYOPHILIZED, FOR SOLUTION INTRAVENOUS ONCE
Status: COMPLETED | OUTPATIENT
Start: 2018-01-06 | End: 2018-01-06

## 2018-01-06 RX ORDER — SODIUM CHLORIDE 9 MG/ML
125 INJECTION, SOLUTION INTRAVENOUS CONTINUOUS
Status: DISCONTINUED | OUTPATIENT
Start: 2018-01-06 | End: 2018-01-08

## 2018-01-06 RX ORDER — HYDROCODONE BITARTRATE AND ACETAMINOPHEN 10; 325 MG/1; MG/1
1 TABLET ORAL EVERY 4 HOURS PRN
Status: DISCONTINUED | OUTPATIENT
Start: 2018-01-06 | End: 2018-01-10 | Stop reason: HOSPADM

## 2018-01-06 RX ORDER — MORPHINE SULFATE 2 MG/ML
2 INJECTION, SOLUTION INTRAMUSCULAR; INTRAVENOUS EVERY 4 HOURS PRN
Status: DISCONTINUED | OUTPATIENT
Start: 2018-01-06 | End: 2018-01-10 | Stop reason: HOSPADM

## 2018-01-06 RX ADMIN — SODIUM CHLORIDE 250 ML/HR: 9 INJECTION, SOLUTION INTRAVENOUS at 19:04

## 2018-01-06 RX ADMIN — SODIUM CHLORIDE 1000 ML: 9 INJECTION, SOLUTION INTRAVENOUS at 18:47

## 2018-01-06 RX ADMIN — PANTOPRAZOLE SODIUM 80 MG: 40 INJECTION, POWDER, FOR SOLUTION INTRAVENOUS at 19:01

## 2018-01-06 RX ADMIN — HYDROCODONE BITARTRATE AND ACETAMINOPHEN 1 TABLET: 10; 325 TABLET ORAL at 23:59

## 2018-01-06 RX ADMIN — SODIUM CHLORIDE 8 MG/HR: 900 INJECTION INTRAVENOUS at 19:24

## 2018-01-06 RX ADMIN — PROMETHAZINE HYDROCHLORIDE 6.25 MG: 25 INJECTION INTRAMUSCULAR; INTRAVENOUS at 20:28

## 2018-01-06 RX ADMIN — SODIUM CHLORIDE 1000 ML: 9 INJECTION, SOLUTION INTRAVENOUS at 19:26

## 2018-01-06 NOTE — ED NOTES
EYES ROLLED BACK IN HEAD - PT NOT ANSWERING QUESTIONS - BAKER AT BEDSIDE.  PT CONSCIOUS - ANSWERING QUESTIONS WITHIN 1 MINUTE. PT PALE AND DIAPHORETIC, PUPILS DILATED.      Jean Paul Carter RN  01/06/18 1834       Jean Paul Carter RN  01/06/18 4015

## 2018-01-06 NOTE — ED PROVIDER NOTES
EMERGENCY DEPARTMENT ENCOUNTER    CHIEF COMPLAINT  Chief Complaint: light headedness, diarrhea  History given by: patient  History limited by: acuity of condition  Room Number: 37/37  PMD: PAULA Puckett      HPI:  Pt is a 42 y.o. male who presents to the ED complaining of 3 episodes of dark, loose stool that he first noticed earlier today. Pt states that he then developed light headedness, chest pressure and SOA but denies N/V. EMS noted that the pt was hypotensive en route as well. Pt denies a history of gastric ulcers. Pt is on Plavix for coronary stents and denies missing any recent doses.    Duration:  One day  Onset: gradual  Timing: intermittent  Quality: dark, loose stool  Intensity/Severity: moderate  Progression: unchanged  Associated Symptoms: SOA, light headedness, chest pressure  Aggravating Factors: none  Alleviating Factors: none  Previous Episodes: Pt denies similar symptoms previously.  Treatment before arrival: none    PAST MEDICAL HISTORY  Active Ambulatory Problems     Diagnosis Date Noted   • ST elevation myocardial infarction (STEMI) 12/17/2017   • Coronary artery disease involving native coronary artery of native heart without angina pectoris 12/26/2017   • Essential hypertension 12/26/2017   • Hyperlipidemia 12/26/2017     Resolved Ambulatory Problems     Diagnosis Date Noted   • No Resolved Ambulatory Problems     Past Medical History:   Diagnosis Date   • Coronary artery disease    • Hyperlipidemia    • Hypertension    • ST elevation myocardial infarction (STEMI) 12/17/2017       PAST SURGICAL HISTORY  Past Surgical History:   Procedure Laterality Date   • APPENDECTOMY     • CARDIAC CATHETERIZATION N/A 12/17/2017    Procedure: Left Heart Cath;  Surgeon: Nick Sahu MD;  Location: [facility] INVASIVE LOCATION;  Service:    • CARDIAC CATHETERIZATION N/A 12/17/2017    Procedure: Stent LAUREN coronary;  Surgeon: Nick Sahu MD;  Location: [facility] INVASIVE LOCATION;  Service:    • CARDIAC  CATHETERIZATION  12/17/2017    Procedure: Percutaneous Manual Thrombectomy;  Surgeon: Nick Sahu MD;  Location: [facility] INVASIVE LOCATION;  Service:    • CORONARY STENT PLACEMENT         FAMILY HISTORY  Family History   Problem Relation Age of Onset   • Heart disease Father    • Heart attack Father    • Heart attack Sister    • Heart disease Sister        SOCIAL HISTORY  Social History     Social History   • Marital status: Single     Spouse name: N/A   • Number of children: N/A   • Years of education: N/A     Occupational History   • Not on file.     Social History Main Topics   • Smoking status: Former Smoker     Packs/day: 1.00     Years: 15.00     Quit date: 12/17/2017   • Smokeless tobacco: Never Used   • Alcohol use 5.4 - 7.2 oz/week     9 - 12 Cans of beer per week      Comment: reports 9-12 beers every three days or so    • Drug use: Yes     Special: Marijuana      Comment: occ   • Sexual activity: Defer     Other Topics Concern   • Not on file     Social History Narrative   • No narrative on file       ALLERGIES  Review of patient's allergies indicates no known allergies.    REVIEW OF SYSTEMS  Review of Systems   Constitutional: Negative for activity change, appetite change and fever.   HENT: Negative for congestion and sore throat.    Eyes: Negative.    Respiratory: Positive for shortness of breath. Negative for cough.    Cardiovascular: Positive for chest pain (pressure). Negative for leg swelling.   Gastrointestinal: Positive for diarrhea (dark stool). Negative for abdominal pain and vomiting.   Endocrine: Negative.    Genitourinary: Negative for decreased urine volume and dysuria.   Musculoskeletal: Negative for neck pain.   Skin: Negative for rash and wound.   Allergic/Immunologic: Negative.    Neurological: Positive for light-headedness. Negative for weakness, numbness and headaches.   Hematological: Negative.    Psychiatric/Behavioral: Negative.    All other systems reviewed and are  negative.      PHYSICAL EXAM  ED Triage Vitals   Temp Heart Rate Resp BP SpO2   -- 01/06/18 1830 01/06/18 1830 01/06/18 1833 --    132 18 75/46       Temp src Heart Rate Source Patient Position BP Location FiO2 (%)   -- 01/06/18 1830 -- -- --    Monitor          Physical Exam   Constitutional: He is oriented to person, place, and time. He appears distressed (moderate).   HENT:   Head: Normocephalic and atraumatic.   Eyes: EOM are normal. Pupils are equal, round, and reactive to light.   Pale conjunctivae. Pupils are dilated bilaterally.   Neck: Normal range of motion. Neck supple.   Cardiovascular: Regular rhythm and normal heart sounds.  Tachycardia present.    Pulmonary/Chest: Effort normal and breath sounds normal. No respiratory distress.   Abdominal: Soft. There is no tenderness. There is no rebound and no guarding.   Genitourinary: Rectal exam shows guaiac positive stool.   Genitourinary Comments: Black liquid stool in the rectal vault   Musculoskeletal: Normal range of motion. He exhibits no edema.   Neurological: He is alert and oriented to person, place, and time. He has normal sensation and normal strength.   Skin: Skin is warm. He is diaphoretic. There is pallor.   Nursing note and vitals reviewed.      LAB RESULTS  Lab Results (last 24 hours)     Procedure Component Value Units Date/Time    CBC & Differential [[id number]] Collected:  01/06/18 1845    Specimen:  Blood Updated:  01/06/18 1906    Narrative:       The following orders were created for panel order CBC & Differential.  Procedure                               Abnormality         Status                     ---------                               -----------         ------                     CBC Auto Differential[[id number]]        Abnormal            Final result                 Please view results for these tests on the individual orders.    CBC Auto Differential [[id number]]  (Abnormal) Collected:  01/06/18 1845    Specimen:  Blood Updated:   01/06/18 1906     WBC 9.34 10*3/mm3      RBC 3.88 (L) 10*6/mm3      Hemoglobin 11.4 (L) g/dL      Hematocrit 35.5 (L) %      MCV 91.5 fL      MCH 29.4 pg      MCHC 32.1 (L) g/dL      RDW 14.2 %      RDW-SD 47.0 fl      MPV 11.9 fL      Platelets 247 10*3/mm3      Neutrophil % 59.9 %      Lymphocyte % 33.2 %      Monocyte % 5.7 %      Eosinophil % 0.3 %      Basophil % 0.3 %      Immature Grans % 0.6 (H) %      Neutrophils, Absolute 5.59 10*3/mm3      Lymphocytes, Absolute 3.10 10*3/mm3      Monocytes, Absolute 0.53 10*3/mm3      Eosinophils, Absolute 0.03 10*3/mm3      Basophils, Absolute 0.03 10*3/mm3      Immature Grans, Absolute 0.06 (H) 10*3/mm3      nRBC 0.2 (H) /100 WBC     Comprehensive Metabolic Panel [[id number]]  (Abnormal) Collected:  01/06/18 1939    Specimen:  Blood Updated:  01/06/18 2015     Glucose 105 (H) mg/dL      BUN 45 (H) mg/dL      Creatinine 1.13 mg/dL      Sodium 141 mmol/L      Potassium 4.0 mmol/L      Chloride 106 mmol/L      CO2 20.1 (L) mmol/L      Calcium 8.3 (L) mg/dL      Total Protein 5.9 (L) g/dL      Albumin 3.30 (L) g/dL      ALT (SGPT) 52 (H) U/L      AST (SGOT) 18 U/L      Alkaline Phosphatase 53 U/L      Total Bilirubin 0.4 mg/dL      eGFR Non African Amer 72 mL/min/1.73      Globulin 2.6 gm/dL      A/G Ratio 1.3 g/dL      BUN/Creatinine Ratio 39.8 (H)     Anion Gap 14.9 mmol/L     Troponin [[id number]]  (Normal) Collected:  01/06/18 1939    Specimen:  Blood Updated:  01/06/18 2013     Troponin T <0.010 ng/mL     Narrative:       Troponin T Reference Ranges:  Less than 0.03 ng/mL:    Negative for AMI  0.03 to 0.09 ng/mL:      Indeterminant for AMI  Greater than 0.09 ng/mL: Positive for AMI          I ordered the above labs and reviewed the results    RADIOLOGY  XR Chest 1 View            Reviewed pt's CXR, which shows nothing acute. Independently viewed by me. Interpreted by radiologist. .    I ordered the above noted radiological studies. Interpreted by radiologist. Reviewed by  me in PACS.       PROCEDURES  Critical Care  Performed by: VÍCTOR PRICE  Authorized by: VÍCTOR PRICE     Critical care provider statement:     Critical care time (minutes):  45    Critical care time was exclusive of:  Separately billable procedures and treating other patients    Critical care was necessary to treat or prevent imminent or life-threatening deterioration of the following conditions:  Circulatory failure    Critical care was time spent personally by me on the following activities:  Development of treatment plan with patient or surrogate, discussions with consultants, evaluation of patient's response to treatment, examination of patient, obtaining history from patient or surrogate, ordering and performing treatments and interventions, ordering and review of laboratory studies, ordering and review of radiographic studies, pulse oximetry, re-evaluation of patient's condition and review of old charts           EKG           EKG time: 1837  Rhythm/Rate: sinus tachycardia, 106  P waves and VA: normal  QRS, axis: normal   ST and T waves: non-specific ST and T wave changes  Artifact present     Interpreted Contemporaneously by me, independently viewed  changed compared to prior 12-18-17 (inferior ST changes have improved)    PROGRESS AND CONSULTS  ED Course     1839- Notified pt that his EKG does not indicate that the pt is having a MI at this time. Discussed the plan to order lab and imaging studies prior to admitting the pt for his GI bleed. Pt agrees with the plan and all questions.    1853- Ordered IVF for hydration and protonix for GI bleed.    1908- Rechecked pt. Pt is now complaining of nausea and continued intermittent light headedness. Pt's XL=568/71. Notified pt of his hemoglobin level and that I am waiting on his additional lab results. Pt agrees with the plan and all questions were addressed.    1927- Per RN, the pt's BP is 72/40 and HR is in the 130's. Per RN, the pt is complaining of light  headedness again and she increased the pt's IVF flow rate. Ordered additional liter of IVF for hypotension and will administer PRBCs if his pressure does not improve.    1930- Ordered 2 units of PRBCs for GI bleed.    1956- Per RN, the pt's BP has improved to 109 systolic with IVF.     2013- Per RN, the pt is complaining of nausea. Ordered phenergan for nausea.    2016- Rechecked pt. Pt's RA=943/77    2020- Placed call to pulmonology for admission.    2024- Discussed the pt's case with Dr. Raul Gann (pulmonology) who agrees to admit the pt to the ICU.    2045- Rechecked pt. Pt is resting comfortably. Notified pt of his lab results, including his elevated BUN and negative troponin. Discussed the consult with Dr. Gann (pulmonology) and the plan to admit the pt for further evaluation. Pt agrees with the plan and all questions were addressed.      MEDICAL DECISION MAKING  Results were reviewed/discussed with the patient and they were also made aware of online access. Pt also made aware that some labs, such as cultures, will not be resulted during ER visit and follow up with PMD is necessary.     MDM  Number of Diagnoses or Management Options  Hypotension due to blood loss:   Upper GI bleed:      Amount and/or Complexity of Data Reviewed  Clinical lab tests: ordered and reviewed (Hgb=11.9, BUN=45)  Tests in the radiology section of CPT®: ordered and reviewed (CXR shows nothing acute)  Tests in the medicine section of CPT®: ordered and reviewed (See EKG procedure note)  Decide to obtain previous medical records or to obtain history from someone other than the patient: yes  Review and summarize past medical records: yes (Pt was admitted on 12-17-17 for a STEMI. Pt was noted to be in V-fib and treated with amiodarone and a shock. Pt had a RCA stent placed by Dr. Sahu (cardiology).)  Discuss the patient with other providers: yes (D/w Dr. Raul Gann (pulmonology))  Independent visualization of images, tracings, or  specimens: yes    Critical Care  Total time providing critical care: 30-74 minutes         DIAGNOSIS  Final diagnoses:   Upper GI bleed   Hypotension due to blood loss       DISPOSITION  ADMISSION    Discussed treatment plan and reason for admission with pt/family and admitting physician.  Pt/family voiced understanding of the plan for admission for further testing/treatment as needed.     Latest Documented Vital Signs:  As of 8:46 PM  BP- 102/71 HR- 101 Temp- 98 °F (36.7 °C) (Oral) O2 sat- 98%    --  Documentation assistance provided by mukul Bocanegra for Dr. Osorio.  Information recorded by the scribe was done at my direction and has been verified and validated by me.     Donna Bocanegra  01/06/18 0605       eNil Osorio MD  01/08/18 5288

## 2018-01-06 NOTE — ED TRIAGE NOTES
PT HAD SYNCOPAL EPISODE TODAY REPORTS DIARRHEA ALL DAY AND BM DURING SYNCOPAL EPISODE, EMS CALLED AHEAD FOR SOA AND 62/PALP BP.

## 2018-01-07 ENCOUNTER — ANESTHESIA EVENT (OUTPATIENT)
Dept: GASTROENTEROLOGY | Facility: HOSPITAL | Age: 43
End: 2018-01-07

## 2018-01-07 ENCOUNTER — ANESTHESIA (OUTPATIENT)
Dept: PERIOP | Facility: HOSPITAL | Age: 43
End: 2018-01-07

## 2018-01-07 ENCOUNTER — ANESTHESIA EVENT (OUTPATIENT)
Dept: PERIOP | Facility: HOSPITAL | Age: 43
End: 2018-01-07

## 2018-01-07 ENCOUNTER — ANESTHESIA (OUTPATIENT)
Dept: GASTROENTEROLOGY | Facility: HOSPITAL | Age: 43
End: 2018-01-07

## 2018-01-07 LAB
HCT VFR BLD AUTO: 27.1 % (ref 40.4–52.2)
HCT VFR BLD AUTO: 27.7 % (ref 40.4–52.2)
HCT VFR BLD AUTO: 30.7 % (ref 40.4–52.2)
HGB BLD-MCNC: 8.9 G/DL (ref 13.7–17.6)
HGB BLD-MCNC: 9 G/DL (ref 13.7–17.6)
HGB BLD-MCNC: 9.7 G/DL (ref 13.7–17.6)
TROPONIN T SERPL-MCNC: <0.01 NG/ML (ref 0–0.03)

## 2018-01-07 PROCEDURE — 25010000002 MIDAZOLAM PER 1 MG: Performed by: ANESTHESIOLOGY

## 2018-01-07 PROCEDURE — 99254 IP/OBS CNSLTJ NEW/EST MOD 60: CPT | Performed by: INTERNAL MEDICINE

## 2018-01-07 PROCEDURE — 84484 ASSAY OF TROPONIN QUANT: CPT | Performed by: INTERNAL MEDICINE

## 2018-01-07 PROCEDURE — 25010000002 ONDANSETRON PER 1 MG: Performed by: NURSE ANESTHETIST, CERTIFIED REGISTERED

## 2018-01-07 PROCEDURE — 25010000002 EPINEPHRINE PER 0.1 MG: Performed by: INTERNAL MEDICINE

## 2018-01-07 PROCEDURE — 25010000002 DEXAMETHASONE PER 1 MG: Performed by: NURSE ANESTHETIST, CERTIFIED REGISTERED

## 2018-01-07 PROCEDURE — 85014 HEMATOCRIT: CPT | Performed by: INTERNAL MEDICINE

## 2018-01-07 PROCEDURE — 25010000002 SUCCINYLCHOLINE PER 20 MG: Performed by: NURSE ANESTHETIST, CERTIFIED REGISTERED

## 2018-01-07 PROCEDURE — 99222 1ST HOSP IP/OBS MODERATE 55: CPT | Performed by: INTERNAL MEDICINE

## 2018-01-07 PROCEDURE — 25010000002 PROPOFOL 10 MG/ML EMULSION: Performed by: ANESTHESIOLOGY

## 2018-01-07 PROCEDURE — 85018 HEMOGLOBIN: CPT | Performed by: INTERNAL MEDICINE

## 2018-01-07 PROCEDURE — 43235 EGD DIAGNOSTIC BRUSH WASH: CPT | Performed by: INTERNAL MEDICINE

## 2018-01-07 PROCEDURE — 94799 UNLISTED PULMONARY SVC/PX: CPT

## 2018-01-07 PROCEDURE — 25010000002 PROPOFOL 10 MG/ML EMULSION: Performed by: NURSE ANESTHETIST, CERTIFIED REGISTERED

## 2018-01-07 PROCEDURE — 25010000002 PHENYLEPHRINE PER 1 ML: Performed by: NURSE ANESTHETIST, CERTIFIED REGISTERED

## 2018-01-07 PROCEDURE — 0W3P8ZZ CONTROL BLEEDING IN GASTROINTESTINAL TRACT, VIA NATURAL OR ARTIFICIAL OPENING ENDOSCOPIC: ICD-10-PCS | Performed by: INTERNAL MEDICINE

## 2018-01-07 PROCEDURE — 0DJ08ZZ INSPECTION OF UPPER INTESTINAL TRACT, VIA NATURAL OR ARTIFICIAL OPENING ENDOSCOPIC: ICD-10-PCS | Performed by: INTERNAL MEDICINE

## 2018-01-07 PROCEDURE — 25010000002 FENTANYL CITRATE (PF) 100 MCG/2ML SOLUTION: Performed by: NURSE ANESTHETIST, CERTIFIED REGISTERED

## 2018-01-07 PROCEDURE — 43255 EGD CONTROL BLEEDING ANY: CPT | Performed by: INTERNAL MEDICINE

## 2018-01-07 RX ORDER — SODIUM CHLORIDE, SODIUM LACTATE, POTASSIUM CHLORIDE, CALCIUM CHLORIDE 600; 310; 30; 20 MG/100ML; MG/100ML; MG/100ML; MG/100ML
INJECTION, SOLUTION INTRAVENOUS CONTINUOUS PRN
Status: DISCONTINUED | OUTPATIENT
Start: 2018-01-07 | End: 2018-01-07 | Stop reason: SURG

## 2018-01-07 RX ORDER — OXYCODONE AND ACETAMINOPHEN 7.5; 325 MG/1; MG/1
1 TABLET ORAL ONCE AS NEEDED
Status: DISCONTINUED | OUTPATIENT
Start: 2018-01-07 | End: 2018-01-07 | Stop reason: HOSPADM

## 2018-01-07 RX ORDER — MIDAZOLAM HYDROCHLORIDE 1 MG/ML
2 INJECTION INTRAMUSCULAR; INTRAVENOUS
Status: DISCONTINUED | OUTPATIENT
Start: 2018-01-07 | End: 2018-01-07

## 2018-01-07 RX ORDER — LIDOCAINE HYDROCHLORIDE 10 MG/ML
0.5 INJECTION, SOLUTION INFILTRATION; PERINEURAL ONCE AS NEEDED
Status: DISCONTINUED | OUTPATIENT
Start: 2018-01-07 | End: 2018-01-07

## 2018-01-07 RX ORDER — LIDOCAINE HYDROCHLORIDE 20 MG/ML
INJECTION, SOLUTION INFILTRATION; PERINEURAL AS NEEDED
Status: DISCONTINUED | OUTPATIENT
Start: 2018-01-07 | End: 2018-01-07 | Stop reason: SURG

## 2018-01-07 RX ORDER — SODIUM CHLORIDE 0.9 % (FLUSH) 0.9 %
1-10 SYRINGE (ML) INJECTION AS NEEDED
Status: DISCONTINUED | OUTPATIENT
Start: 2018-01-07 | End: 2018-01-07

## 2018-01-07 RX ORDER — MIDAZOLAM HYDROCHLORIDE 1 MG/ML
1 INJECTION INTRAMUSCULAR; INTRAVENOUS
Status: DISCONTINUED | OUTPATIENT
Start: 2018-01-07 | End: 2018-01-07

## 2018-01-07 RX ORDER — SODIUM CHLORIDE, SODIUM LACTATE, POTASSIUM CHLORIDE, CALCIUM CHLORIDE 600; 310; 30; 20 MG/100ML; MG/100ML; MG/100ML; MG/100ML
9 INJECTION, SOLUTION INTRAVENOUS CONTINUOUS
Status: DISCONTINUED | OUTPATIENT
Start: 2018-01-07 | End: 2018-01-07

## 2018-01-07 RX ORDER — DIPHENHYDRAMINE HYDROCHLORIDE 50 MG/ML
12.5 INJECTION INTRAMUSCULAR; INTRAVENOUS
Status: DISCONTINUED | OUTPATIENT
Start: 2018-01-07 | End: 2018-01-07 | Stop reason: HOSPADM

## 2018-01-07 RX ORDER — HYDROCODONE BITARTRATE AND ACETAMINOPHEN 7.5; 325 MG/1; MG/1
1 TABLET ORAL ONCE AS NEEDED
Status: DISCONTINUED | OUTPATIENT
Start: 2018-01-07 | End: 2018-01-07 | Stop reason: HOSPADM

## 2018-01-07 RX ORDER — DEXAMETHASONE SODIUM PHOSPHATE 4 MG/ML
INJECTION, SOLUTION INTRA-ARTICULAR; INTRALESIONAL; INTRAMUSCULAR; INTRAVENOUS; SOFT TISSUE AS NEEDED
Status: DISCONTINUED | OUTPATIENT
Start: 2018-01-07 | End: 2018-01-07 | Stop reason: SURG

## 2018-01-07 RX ORDER — PROMETHAZINE HYDROCHLORIDE 25 MG/ML
12.5 INJECTION, SOLUTION INTRAMUSCULAR; INTRAVENOUS ONCE AS NEEDED
Status: DISCONTINUED | OUTPATIENT
Start: 2018-01-07 | End: 2018-01-07 | Stop reason: HOSPADM

## 2018-01-07 RX ORDER — FLUMAZENIL 0.1 MG/ML
0.2 INJECTION INTRAVENOUS AS NEEDED
Status: DISCONTINUED | OUTPATIENT
Start: 2018-01-07 | End: 2018-01-07 | Stop reason: HOSPADM

## 2018-01-07 RX ORDER — PROMETHAZINE HYDROCHLORIDE 25 MG/1
12.5 TABLET ORAL ONCE AS NEEDED
Status: DISCONTINUED | OUTPATIENT
Start: 2018-01-07 | End: 2018-01-07 | Stop reason: HOSPADM

## 2018-01-07 RX ORDER — PROPOFOL 10 MG/ML
VIAL (ML) INTRAVENOUS AS NEEDED
Status: DISCONTINUED | OUTPATIENT
Start: 2018-01-07 | End: 2018-01-07 | Stop reason: SURG

## 2018-01-07 RX ORDER — ONDANSETRON 2 MG/ML
INJECTION INTRAMUSCULAR; INTRAVENOUS AS NEEDED
Status: DISCONTINUED | OUTPATIENT
Start: 2018-01-07 | End: 2018-01-07 | Stop reason: SURG

## 2018-01-07 RX ORDER — SUCCINYLCHOLINE CHLORIDE 20 MG/ML
INJECTION INTRAMUSCULAR; INTRAVENOUS AS NEEDED
Status: DISCONTINUED | OUTPATIENT
Start: 2018-01-07 | End: 2018-01-07 | Stop reason: SURG

## 2018-01-07 RX ORDER — FAMOTIDINE 10 MG/ML
20 INJECTION, SOLUTION INTRAVENOUS ONCE
Status: COMPLETED | OUTPATIENT
Start: 2018-01-07 | End: 2018-01-07

## 2018-01-07 RX ORDER — HYDRALAZINE HYDROCHLORIDE 20 MG/ML
5 INJECTION INTRAMUSCULAR; INTRAVENOUS
Status: DISCONTINUED | OUTPATIENT
Start: 2018-01-07 | End: 2018-01-07 | Stop reason: HOSPADM

## 2018-01-07 RX ORDER — EPHEDRINE SULFATE 50 MG/ML
5 INJECTION, SOLUTION INTRAVENOUS ONCE AS NEEDED
Status: DISCONTINUED | OUTPATIENT
Start: 2018-01-07 | End: 2018-01-07 | Stop reason: HOSPADM

## 2018-01-07 RX ORDER — FENTANYL CITRATE 50 UG/ML
INJECTION, SOLUTION INTRAMUSCULAR; INTRAVENOUS AS NEEDED
Status: DISCONTINUED | OUTPATIENT
Start: 2018-01-07 | End: 2018-01-07 | Stop reason: SURG

## 2018-01-07 RX ORDER — ESMOLOL HYDROCHLORIDE 10 MG/ML
INJECTION INTRAVENOUS AS NEEDED
Status: DISCONTINUED | OUTPATIENT
Start: 2018-01-07 | End: 2018-01-07 | Stop reason: SURG

## 2018-01-07 RX ORDER — FENTANYL CITRATE 50 UG/ML
50 INJECTION, SOLUTION INTRAMUSCULAR; INTRAVENOUS
Status: DISCONTINUED | OUTPATIENT
Start: 2018-01-07 | End: 2018-01-07

## 2018-01-07 RX ORDER — PROMETHAZINE HYDROCHLORIDE 25 MG/1
25 SUPPOSITORY RECTAL ONCE AS NEEDED
Status: DISCONTINUED | OUTPATIENT
Start: 2018-01-07 | End: 2018-01-07 | Stop reason: HOSPADM

## 2018-01-07 RX ORDER — HYDROMORPHONE HYDROCHLORIDE 1 MG/ML
0.5 INJECTION, SOLUTION INTRAMUSCULAR; INTRAVENOUS; SUBCUTANEOUS
Status: DISCONTINUED | OUTPATIENT
Start: 2018-01-07 | End: 2018-01-07 | Stop reason: HOSPADM

## 2018-01-07 RX ORDER — SODIUM CHLORIDE, SODIUM LACTATE, POTASSIUM CHLORIDE, CALCIUM CHLORIDE 600; 310; 30; 20 MG/100ML; MG/100ML; MG/100ML; MG/100ML
1000 INJECTION, SOLUTION INTRAVENOUS CONTINUOUS PRN
Status: DISCONTINUED | OUTPATIENT
Start: 2018-01-07 | End: 2018-01-07

## 2018-01-07 RX ORDER — FENTANYL CITRATE 50 UG/ML
INJECTION, SOLUTION INTRAMUSCULAR; INTRAVENOUS
Status: COMPLETED
Start: 2018-01-07 | End: 2018-01-07

## 2018-01-07 RX ORDER — SODIUM CHLORIDE 0.9 % (FLUSH) 0.9 %
3 SYRINGE (ML) INJECTION AS NEEDED
Status: DISCONTINUED | OUTPATIENT
Start: 2018-01-07 | End: 2018-01-07

## 2018-01-07 RX ORDER — PROMETHAZINE HYDROCHLORIDE 25 MG/1
25 TABLET ORAL ONCE AS NEEDED
Status: DISCONTINUED | OUTPATIENT
Start: 2018-01-07 | End: 2018-01-07 | Stop reason: HOSPADM

## 2018-01-07 RX ORDER — ONDANSETRON 2 MG/ML
4 INJECTION INTRAMUSCULAR; INTRAVENOUS ONCE AS NEEDED
Status: DISCONTINUED | OUTPATIENT
Start: 2018-01-07 | End: 2018-01-07 | Stop reason: HOSPADM

## 2018-01-07 RX ORDER — NALOXONE HCL 0.4 MG/ML
0.2 VIAL (ML) INJECTION AS NEEDED
Status: DISCONTINUED | OUTPATIENT
Start: 2018-01-07 | End: 2018-01-07 | Stop reason: HOSPADM

## 2018-01-07 RX ORDER — PROPOFOL 10 MG/ML
VIAL (ML) INTRAVENOUS CONTINUOUS PRN
Status: DISCONTINUED | OUTPATIENT
Start: 2018-01-07 | End: 2018-01-07 | Stop reason: SURG

## 2018-01-07 RX ORDER — LABETALOL HYDROCHLORIDE 5 MG/ML
5 INJECTION, SOLUTION INTRAVENOUS
Status: DISCONTINUED | OUTPATIENT
Start: 2018-01-07 | End: 2018-01-07 | Stop reason: HOSPADM

## 2018-01-07 RX ORDER — SUCRALFATE ORAL 1 G/10ML
1 SUSPENSION ORAL EVERY 6 HOURS SCHEDULED
Status: DISCONTINUED | OUTPATIENT
Start: 2018-01-07 | End: 2018-01-10 | Stop reason: HOSPADM

## 2018-01-07 RX ORDER — FENTANYL CITRATE 50 UG/ML
50 INJECTION, SOLUTION INTRAMUSCULAR; INTRAVENOUS
Status: DISCONTINUED | OUTPATIENT
Start: 2018-01-07 | End: 2018-01-07 | Stop reason: HOSPADM

## 2018-01-07 RX ADMIN — HYDROCODONE BITARTRATE AND ACETAMINOPHEN 1 TABLET: 10; 325 TABLET ORAL at 22:02

## 2018-01-07 RX ADMIN — SODIUM CHLORIDE 8 MG/HR: 900 INJECTION INTRAVENOUS at 10:32

## 2018-01-07 RX ADMIN — PHENYLEPHRINE HYDROCHLORIDE 100 MCG: 10 INJECTION INTRAVENOUS at 16:34

## 2018-01-07 RX ADMIN — LIDOCAINE HYDROCHLORIDE 100 MG: 20 INJECTION, SOLUTION INFILTRATION; PERINEURAL at 16:19

## 2018-01-07 RX ADMIN — Medication 2 MG: at 14:52

## 2018-01-07 RX ADMIN — HYDROCODONE BITARTRATE AND ACETAMINOPHEN 1 TABLET: 10; 325 TABLET ORAL at 17:44

## 2018-01-07 RX ADMIN — SUCRALFATE 1 G: 1 SUSPENSION ORAL at 21:14

## 2018-01-07 RX ADMIN — PROPOFOL 200 MG: 10 INJECTION, EMULSION INTRAVENOUS at 11:49

## 2018-01-07 RX ADMIN — SODIUM CHLORIDE, POTASSIUM CHLORIDE, SODIUM LACTATE AND CALCIUM CHLORIDE: 600; 310; 30; 20 INJECTION, SOLUTION INTRAVENOUS at 11:26

## 2018-01-07 RX ADMIN — SODIUM CHLORIDE, POTASSIUM CHLORIDE, SODIUM LACTATE AND CALCIUM CHLORIDE: 600; 310; 30; 20 INJECTION, SOLUTION INTRAVENOUS at 16:49

## 2018-01-07 RX ADMIN — ESMOLOL HYDROCHLORIDE 30 MG: 10 INJECTION, SOLUTION INTRAVENOUS at 16:45

## 2018-01-07 RX ADMIN — FAMOTIDINE 20 MG: 10 INJECTION, SOLUTION INTRAVENOUS at 14:51

## 2018-01-07 RX ADMIN — HYDROCODONE BITARTRATE AND ACETAMINOPHEN 1 TABLET: 10; 325 TABLET ORAL at 08:44

## 2018-01-07 RX ADMIN — SODIUM CHLORIDE 125 ML/HR: 9 INJECTION, SOLUTION INTRAVENOUS at 17:45

## 2018-01-07 RX ADMIN — SODIUM CHLORIDE 8 MG/HR: 900 INJECTION INTRAVENOUS at 04:36

## 2018-01-07 RX ADMIN — DEXAMETHASONE SODIUM PHOSPHATE 10 MG: 4 INJECTION INTRA-ARTICULAR; INTRALESIONAL; INTRAMUSCULAR; INTRAVENOUS; SOFT TISSUE at 16:24

## 2018-01-07 RX ADMIN — PROPOFOL 100 MCG/KG/MIN: 10 INJECTION, EMULSION INTRAVENOUS at 11:49

## 2018-01-07 RX ADMIN — ONDANSETRON 4 MG: 2 INJECTION INTRAMUSCULAR; INTRAVENOUS at 16:27

## 2018-01-07 RX ADMIN — SODIUM CHLORIDE, POTASSIUM CHLORIDE, SODIUM LACTATE AND CALCIUM CHLORIDE 9 ML/HR: 600; 310; 30; 20 INJECTION, SOLUTION INTRAVENOUS at 14:51

## 2018-01-07 RX ADMIN — SODIUM CHLORIDE, POTASSIUM CHLORIDE, SODIUM LACTATE AND CALCIUM CHLORIDE: 600; 310; 30; 20 INJECTION, SOLUTION INTRAVENOUS at 16:11

## 2018-01-07 RX ADMIN — PROPOFOL 200 MG: 10 INJECTION, EMULSION INTRAVENOUS at 16:19

## 2018-01-07 RX ADMIN — SUCCINYLCHOLINE CHLORIDE 180 MG: 20 INJECTION, SOLUTION INTRAMUSCULAR; INTRAVENOUS; PARENTERAL at 16:19

## 2018-01-07 RX ADMIN — PHENYLEPHRINE HYDROCHLORIDE 100 MCG: 10 INJECTION INTRAVENOUS at 16:19

## 2018-01-07 RX ADMIN — FENTANYL CITRATE 100 MCG: 50 INJECTION INTRAMUSCULAR; INTRAVENOUS at 16:17

## 2018-01-07 RX ADMIN — SODIUM CHLORIDE 8 MG/HR: 900 INJECTION INTRAVENOUS at 22:02

## 2018-01-07 RX ADMIN — LIDOCAINE HYDROCHLORIDE 100 MG: 20 INJECTION, SOLUTION INFILTRATION; PERINEURAL at 11:49

## 2018-01-07 NOTE — PLAN OF CARE
Problem: GI Endoscopy (Adult)  Intervention: Monitor/Manage Procedure Recovery   01/07/18 1130   Respiratory Interventions   Airway/Ventilation Management airway patency maintained   Coping/Psychosocial Interventions   Environmental Support calm environment promoted   Activity   Activity Type activity adjusted per tolerance   Cardiac Interventions   Warming Thermoregulation Maintenance warm blankets applied     Intervention: Prevent Manasa-procedural Injury   01/07/18 1130   Positioning   Positioning side lying, left   Head Of Bed (HOB) Position HOB elevated       Goal: Signs and Symptoms of Listed Potential Problems Will be Absent or Manageable (GI Endoscopy)  Outcome: Ongoing (interventions implemented as appropriate)   01/07/18 1130   GI Endoscopy   Problems Assessed (GI Endoscopy) all   Problems Present (GI Endoscopy) none

## 2018-01-07 NOTE — PROGRESS NOTES
"  Daily Progress Note.   [facility]  1/7/2018    Patient:  Name:  Ruddy Griffin  MRN:  [id number]  1975  42 y.o.  male         Interval History:  No cp  No lighthedness.  No soa.  No further melena.    Physical Exam:  /95  Pulse 96  Temp 98.8 °F (37.1 °C) (Oral)   Resp 16  Ht 175.3 cm (69\")  Wt 98.4 kg (216 lb 14.9 oz)  SpO2 96%  BMI 32.04 kg/m2  Body mass index is 32.04 kg/(m^2).    Intake/Output Summary (Last 24 hours) at 01/07/18 1301  Last data filed at 01/07/18 1215   Gross per 24 hour   Intake             2652 ml   Output             2675 ml   Net              -23 ml     GENERAL:  NAD, Aox3  HEENT:  EOMI, nonicteric sclera, no JVD  PULMONARY:    CTAB, no wheeze, no crackles, no rhonchi, symmetric air entry  CARDIAC:  RRR no MRG, S1 S2  ABD: SNTND BS+  EXT: no c/c/e, pulses symmetric 2+ bilaterally  NEURO:  CNs II-XII intact, no focal deficits  SKIN: no lesions, no rash  PSYCH: appropriate mood  LYMPH: no cervical LAD    Scheduled meds:       Data Review:   I reviewed the patient's medications, imaging and new clinical results.    Results from last 7 days  Lab Units 01/07/18  0832 01/07/18  0023 01/06/18  1845   WBC 10*3/mm3  --   --  9.34   HEMOGLOBIN g/dL 9.0* 9.7* 11.4*   PLATELETS 10*3/mm3  --   --  247     Results from last 7 days  Lab Units 01/06/18  1939   SODIUM mmol/L 141   POTASSIUM mmol/L 4.0   CHLORIDE mmol/L 106   CO2 mmol/L 20.1*   BUN mg/dL 45*   CREATININE mg/dL 1.13   GLUCOSE mg/dL 105*   CALCIUM mg/dL 8.3*   Estimated Creatinine Clearance: 99.5 mL/min (by C-G formula based on Cr of 1.13).    Imaging Results (most recent)     Procedure Component Value Units Date/Time    XR Chest 1 View [[id number]] Collected:  01/06/18 1929     Updated:  01/06/18 1929    Narrative:       PORTABLE CHEST     HISTORY: Chest pain.     FINDINGS:  A single view of the chest demonstrates the heart to be  within normal limits in size. There is no evidence of focal " infiltrate,  effusion or of congestive failure.             ASSESSMENT  /  PLAN:  UGIB  Hypotension resolved  ABLA - - gib  STEMI s/p stent recently - plavix on hold until cleared by GI/GIB resolved/addressed  CP resolved    -serial hh  Monitor in unti  egd today  Gi and cards following.    Kb Gann MD  [facility]  01/07/18

## 2018-01-07 NOTE — ANESTHESIA POSTPROCEDURE EVALUATION
"Patient: Ruddy Griffin    Procedure Summary     Date Anesthesia Start Anesthesia Stop Room / Location    01/07/18 1145 1215  [facility] /  [facility]       Procedure Diagnosis Surgeon Provider    ESOPHAGOGASTRODUODENOSCOPY AT BEDSIDE (N/A Esophagus) Upper GI bleed  (Upper GI bleed [K92.2]) MD Cain Nam MD          Anesthesia Type: MAC  Last vitals  BP   132/95 (01/07/18 0903)   Temp   37.1 °C (98.8 °F) (01/07/18 1113)   Pulse   96 (01/07/18 0909)   Resp   16 (01/06/18 1907)     SpO2   96 % (01/07/18 0909)     Post Anesthesia Care and Evaluation    Patient location during evaluation: PACU  Patient participation: complete - patient participated  Level of consciousness: sleepy but conscious  Pain score: 0  Pain management: adequate  Airway patency: patent  Anesthetic complications: No anesthetic complications    Cardiovascular status: acceptable  Respiratory status: acceptable  Hydration status: acceptable    Comments: /95  Pulse 96  Temp 37.1 °C (98.8 °F) (Oral)   Resp 16  Ht 175.3 cm (69\")  Wt 98.4 kg (216 lb 14.9 oz)  SpO2 96%  BMI 32.04 kg/m2        "

## 2018-01-07 NOTE — PLAN OF CARE
Problem: Patient Care Overview (Adult)  Goal: Plan of Care Review  Outcome: Ongoing (interventions implemented as appropriate)   01/07/18 1822   Coping/Psychosocial Response Interventions   Plan Of Care Reviewed With patient   Patient Care Overview   Progress no change   Outcome Evaluation   Outcome Summary/Follow up Plan Attempted EGD today, MD stopped due to patients sleep apnea. Second EGD performed in OR. Pt came from PACU A&O times 4. See MD op report.      Goal: Adult Individualization and Mutuality  Outcome: Ongoing (interventions implemented as appropriate)    Goal: Discharge Needs Assessment  Outcome: Ongoing (interventions implemented as appropriate)      Problem: Pain, Acute (Adult)  Goal: Identify Related Risk Factors and Signs and Symptoms  Outcome: Ongoing (interventions implemented as appropriate)    Goal: Acceptable Pain Control/Comfort Level  Outcome: Ongoing (interventions implemented as appropriate)      Problem: GI Endoscopy (Adult)  Goal: Signs and Symptoms of Listed Potential Problems Will be Absent or Manageable (GI Endoscopy)  Outcome: Ongoing (interventions implemented as appropriate)      Problem: Perioperative Period (Adult)  Goal: Signs and Symptoms of Listed Potential Problems Will be Absent or Manageable (Perioperative Period)  Outcome: Ongoing (interventions implemented as appropriate)

## 2018-01-07 NOTE — ANESTHESIA PREPROCEDURE EVALUATION
Anesthesia Evaluation     Patient summary reviewed and Nursing notes reviewed   NPO Solid Status: > 8 hours  NPO Liquid Status: > 8 hours     Airway   Mallampati: II  Neck ROM: full  Dental - normal exam     Pulmonary     breath sounds clear to auscultation  (+) a smoker Former,   Cardiovascular     Rhythm: regular    (+) hypertension, past MI , CAD, cardiac stents within the past 12 months hyperlipidemia      Neuro/Psych  GI/Hepatic/Renal/Endo    (+) obesity,      Musculoskeletal     Abdominal   (+) obese,    Substance History   (+) drug use     OB/GYN          Other                                              Anesthesia Plan    ASA 3     intravenous induction   Anesthetic plan and risks discussed with patient.

## 2018-01-07 NOTE — PLAN OF CARE
Problem: Fall Risk (Adult)  Goal: Identify Related Risk Factors and Signs and Symptoms  Outcome: Ongoing (interventions implemented as appropriate)    Goal: Absence of Falls  Outcome: Ongoing (interventions implemented as appropriate)      Problem: Skin Integrity Impairment, Risk/Actual (Adult)  Goal: Identify Related Risk Factors and Signs and Symptoms  Outcome: Outcome(s) achieved Date Met: 01/07/18    Goal: Skin Integrity/Wound Healing  Outcome: Ongoing (interventions implemented as appropriate)      Problem: Gastrointestinal Bleeding (Adult)  Goal: Signs and Symptoms of Listed Potential Problems Will be Absent or Manageable (Gastrointestinal Bleeding)  Outcome: Ongoing (interventions implemented as appropriate)      Problem: Patient Care Overview (Adult)  Goal: Plan of Care Review  Outcome: Ongoing (interventions implemented as appropriate)   01/07/18 0536   Coping/Psychosocial Response Interventions   Plan Of Care Reviewed With patient   Patient Care Overview   Progress progress toward functional goals as expected   Outcome Evaluation   Outcome Summary/Follow up Plan pt arrived in CCU from ER overnight, VSS, monitoring H/H and troponin, complaints of chronic back pain, previous admission in December for a STEMI, will continue to monitor

## 2018-01-07 NOTE — CONSULTS
Patient Name: Ruddy Griffin  Age/Sex: 42 y.o. male  : 1975  MRN: [id number]    Date of Admission: 2018  Date of Encounter Visit: 18  Encounter Provider: Cain Car MD  Place of Service: [facility]      Referring Provider: Raul Gann MD  Patient Care Team:  PAULA Puckett as PCP - General (Family Medicine)    Subjective:     Consulted for:  Evaluation of Plavix after recent STEMI    Chief Complaint: GI bleed     History of Present Illness:  Ruddy Griffin is a 42 y.o. male , former smoker- with hx of HTN- who was recently hospitalized in [facility] for nausea and GI discomfort- EKG found him to have ST elevation in the inferior leads, and he had VF that was treated with one shock. He underwent cardiac catheterization where he had a high grade proximal RCA lesion and acute occlusion of his distal RCA that was treated with thrombectomy and LAUREN. During the intervention he did have cardiogenic shock with bradycardia, but this stabilized after revascularization. He was discharged on 17. His echocardiogram on 17 found an EF of 58%, mild mitral valve regurgitation, trace tricuspid valve regurgitation, borderline dilation of the ascending aorta measuring 3.8 cm, and hypokinesis of the basal and mid inferior wall segments.  He was started on aspirin, atorvastatin, carvedilol, and clopidogrel. He followed up with Naila Bran in the office on 17- where he had stated that had quit smoking and was without chest pain. He had plans to start cardiac rehab soon.     He came into the ER yesterday for dark loose stools with some lightheadedness, SOA, and chest pressure. He had abdominal pain the night before but did drink 9 beers. Per his report he did lose consciousness at home.  He denied chest pain at that time but did complain of a short duration of chest discomfort in the ER prior to IV fluid administration.  No chest  discomfort this morning.  States that he feels better.Hgb went from 11.4 to 9.7. GI was consulted, he was started on a protonix drip, and there are plans for EGD today.       Previous testing:   Echocardiogram on 12/18/17-  Interpretation Summary      · Left ventricular systolic function is normal. Calculated EF = 58%. Estimated EF was in agreement with the calculated EF. Estimated EF = 58%. Normal left ventricular cavity size and wall thickness noted. Left ventricular diastolic function is normal.  · Mild mitral valve regurgitation is present.  · Trace tricuspid valve regurgitation is present. Estimated right ventricular systolic pressure from tricuspid regurgitation is normal (<35 mmHg).  · Borderline dilation of the ascending aorta is present. Mid ascending aorta measured 3.8 cm.  · The following segments are hypokinetic: basal inferior and mid inferior. All other segments are normal.     Cardiac Catheterization on 12/17/17-  FINDINGS:     1. HEMODYNAMICS:  LV 80/12, AO 80/52/60.     2. LEFT VENTRICULOGRAPHY: EF 60%, no mitral regurgitation.  Mild inferior hypokinesis.     3. CORONARY ANGIOGRAPHY: Right dominant system, one-vessel coronary disease.  The left main is normal.  The proximal LAD has 50% stenosis.  The mid to distal LAD has diffuse 50% stenosis.  The ramus is a very large branch and is normal.  The circumflex is moderate in size and has 20% distal stenosis.  The RCA is large and dominant.  The proximal vessel has up to 90% stenosis.  This is a long lesion.  The mid vessel is normal.  The distal vessel is totally occluded.       4. INTERVENTIONAL COMMENTS:  Successful thrombectomy/LAUREN of the proximal and distal RCA was performed as described above.     SUMMARY: Acute inferior STEMI complicated by VF, cardiogenic shock, and bradycardia, successfully treated with thrombectomy/LAUREN of the RCA.     RECOMMENDATIONS: Routine post MI care.      Past Medical History:  Past Medical History:   Diagnosis Date   •  Coronary artery disease    • Hyperlipidemia    • Hypertension    • ST elevation myocardial infarction (STEMI) 12/17/2017       Past Surgical History:   Procedure Laterality Date   • APPENDECTOMY     • CARDIAC CATHETERIZATION N/A 12/17/2017    Procedure: Left Heart Cath;  Surgeon: Nick Sahu MD;  Location: [facility] INVASIVE LOCATION;  Service:    • CARDIAC CATHETERIZATION N/A 12/17/2017    Procedure: Stent LAUREN coronary;  Surgeon: Nick Sahu MD;  Location: [facility] CATH INVASIVE LOCATION;  Service:    • CARDIAC CATHETERIZATION  12/17/2017    Procedure: Percutaneous Manual Thrombectomy;  Surgeon: Nick Sahu MD;  Location: [facility] CATH INVASIVE LOCATION;  Service:    • CORONARY STENT PLACEMENT         Home Medications:   Prescriptions Prior to Admission   Medication Sig Dispense Refill Last Dose   • aspirin 81 MG chewable tablet Chew 1 tablet Daily. 30 tablet 5 Taking   • atorvastatin (LIPITOR) 20 MG tablet Take 1 tablet by mouth Daily. 30 tablet 11 Taking   • carvedilol (COREG) 6.25 MG tablet Take 1 tablet by mouth 2 (two) times a day with meals. 60 tablet 0 Taking   • clopidogrel (PLAVIX) 75 MG tablet Take 1 tablet by mouth Daily. 30 tablet 5 Taking   • cyclobenzaprine (FLEXERIL) 10 MG tablet Take 1 tablet by mouth 3 (three) times a day as needed for muscle spasms. 21 tablet 0 Taking   • HYDROcodone-acetaminophen (NORCO) 7.5-325 MG per tablet Take 1 tablet by mouth every 6 (six) hours as needed for moderate pain (4-6). 14 tablet 0 Taking       Allergies:  No Known Allergies    Past Social History:  Social History     Social History   • Marital status: Single     Spouse name: N/A   • Number of children: N/A   • Years of education: N/A     Occupational History   • Not on file.     Social History Main Topics   • Smoking status: Former Smoker     Packs/day: 1.00     Years: 15.00     Quit date: 12/17/2017   • Smokeless tobacco: Never Used   • Alcohol use 5.4 - 7.2 oz/week     9 - 12 Cans of beer per week      Comment:  reports 9-12 beers every three days or so    • Drug use: Yes     Special: Marijuana      Comment: occ   • Sexual activity: Defer     Other Topics Concern   • Not on file     Social History Narrative   • No narrative on file        Past Family History:  Family History   Problem Relation Age of Onset   • Heart disease Father    • Heart attack Father    • Heart attack Sister    • Heart disease Sister          Review of Systems:  All systems reviewed. Pertinent positives identified in HPI. All other systems are negative.         Objective:     Objective:  Temp:  [98 °F (36.7 °C)-98.5 °F (36.9 °C)] 98 °F (36.7 °C)  Heart Rate:  [] 98  Resp:  [16-18] 16  BP: ()/(36-96) 109/84    Intake/Output Summary (Last 24 hours) at 01/07/18 0849  Last data filed at 01/07/18 0617   Gross per 24 hour   Intake             2052 ml   Output             2175 ml   Net             -123 ml     Body mass index is 32.04 kg/(m^2).  Last 3 weights    01/06/18  1847 01/06/18  2130   Weight: 95.3 kg (210 lb) 98.4 kg (216 lb 14.9 oz)           Physical Exam:   General Appearance Well developed, cooperative and well nourished and no acute distress   Head Normocephalic, without abnormality, atraumatic   Ears Ears appear intact with no abnormalities noted   Throat No oral lesions, no thrush, oral mucosa moist   Neck No adenopathy, supple, trachea midline, no thyromegaly, no carotid bruit, no JVD   Back No skin lesions, erythema or scars, no tenderness to percussion or palptaion,range of motion is normal   Lungs Clear to auscultation,respirations regular, even and unlabored   Heart Regular rhythm and normal rate, normal S1 and S2, no murmur, no gallop, no rub, no click   Chest wall No abnormalities observed   Abdomen Normal bowel sounds, no masses, no hepatomegaly,    Extremities Moves all extremities well, no edema, no cyanosis, no redness   Pulses Pulses palpable and equal bilaterally. Normal radial, carotid, femoral, dorsalis pedis and  posterior tibial pulses bilaterally. Normal abdominal aorta   Skin No bleeding, bruising or rash   Psyhiatric Alert and oriented x 3, normal mood and affect       Lab Review:       Results from last 7 days  Lab Units 01/06/18  1939   SODIUM mmol/L 141   POTASSIUM mmol/L 4.0   CHLORIDE mmol/L 106   CO2 mmol/L 20.1*   BUN mg/dL 45*   CREATININE mg/dL 1.13   GLUCOSE mg/dL 105*   CALCIUM mg/dL 8.3*         Results from last 7 days  Lab Units 01/07/18  0023 01/06/18  1939   TROPONIN T ng/mL <0.010 <0.010       Results from last 7 days  Lab Units 01/07/18  0023 01/06/18  1845   WBC 10*3/mm3  --  9.34   HEMOGLOBIN g/dL 9.7* 11.4*   HEMATOCRIT % 30.7* 35.5*   PLATELETS 10*3/mm3  --  247                                   Imaging:    Imaging Results (most recent)     Procedure Component Value Units Date/Time    XR Chest 1 View [[id number]] Collected:  01/06/18 1929     Updated:  01/06/18 1929    Narrative:       PORTABLE CHEST     HISTORY: Chest pain.     FINDINGS:  A single view of the chest demonstrates the heart to be  within normal limits in size. There is no evidence of focal infiltrate,  effusion or of congestive failure.             Results for orders placed during the hospital encounter of 12/17/17   Echocardiogram 2D complete    Addendum · Left ventricular systolic function is normal. Calculated EF = 58%.  Estimated EF was in agreement with the calculated EF. Estimated EF = 58%.  Normal left ventricular cavity size and wall thickness noted. Left  ventricular diastolic function is normal. · Mild mitral valve regurgitation is present. · Trace tricuspid valve regurgitation is present. Estimated right  ventricular systolic pressure from tricuspid regurgitation is normal (<35  mmHg). · Borderline dilation of the ascending aorta is present. Mid ascending  aorta measured 3.8 cm. · The following segments are hypokinetic: basal inferior and mid inferior.  All other segments are normal.        Micaela Burris MD 12/18/2017 10:50 AM           Narrative · Left ventricular systolic function is normal. Calculated EF = 58%.   Estimated EF was in agreement with the calculated EF. Estimated EF = 58%.   Normal left ventricular cavity size and wall thickness noted. Left   ventricular diastolic function is normal.  · Mild mitral valve regurgitation is present.  · Trace tricuspid valve regurgitation is present. Estimated right   ventricular systolic pressure from tricuspid regurgitation is normal (<35   mmHg).  · Borderline dilation of the ascending aorta is present. Mid ascending   aorta measured 3.8 cm.          EK18          BASELINE: 17        I personally viewed and interpreted the patient's EKG/Telemetry data.    Assessment:   Assessment/Plan   Active Problems:    Upper GI bleed  Acute blood loss anemia  CAD with recent inferior STEMI and PCI to the RCA  Hemorrhagic shock: Resolved  Hyperlipidemia    Plan:   -Likely PUD with excessive alcohol usage and dual antiplatelet therapy leading to bleed  -Unfortunately he did have a STEMI last month and drug-eluting stents to the RCA.  Once we get past about 2-3 days off of dual antiplatelet therapy hour risk of stent thrombosis is going to increase significantly.  -I will await the EGD today, however I would prefer the patient to be back on aspirin and Plavix tomorrow if adequate crit is stable and no additional bleeding.    -I will await GI evaluation    Thank you for allowing me to participate in the care of Ruddy Griffin. Feel free to contact me directly with any further questions or concerns.    Cain Car MD  [facility]  18  8:49 AM

## 2018-01-07 NOTE — H&P
[facility]    CC: dark, loose stools    HPI:  Mr. Griffin is a 41yo WM with a recent MI s/p stenting on plavix.  He presented to the ER today with sudden onset of 3 episodes of dark, loose stool that he first noticed earlier today.  He has had some associated light headedness, chest pressure and shortness of breath.  He was hypotensive on the way to the ER.  He has never had a gastric ulcer to his knowledge.  He notices no alleviating or exacerbating factors.  He has decrease in Hgb found on labs to day in ER.  BUN is elevated.      He said he did have some abdominal pain last night, but has none now.  He says he had about 9 beers yesterday.    He said I don't have to worry about him going into alcohol withdrawal.  Doesn't look like he did when he was here with AMI.  He does have chronic back pain but best I can tell he has not been taking NSAIDS for this, he says he was told not to take NSAIDS    Past Medical History:   Diagnosis Date   • Coronary artery disease    • Hyperlipidemia    • Hypertension    • ST elevation myocardial infarction (STEMI) 12/17/2017     Social History     Social History   • Marital status: Single     Spouse name: N/A   • Number of children: N/A   • Years of education: N/A     Social History Main Topics   • Smoking status: Former Smoker     Packs/day: 1.00     Years: 15.00     Quit date: 12/17/2017   • Smokeless tobacco: Never Used   • Alcohol use 5.4 - 7.2 oz/week     9 - 12 Cans of beer per week      Comment: reports 9-12 beers every three days or so    • Drug use: Yes     Special: Marijuana      Comment: occ   • Sexual activity: Defer     Other Topics Concern   • None     Social History Narrative   • None     Family History   Problem Relation Age of Onset   • Heart disease Father    • Heart attack Father    • Heart attack Sister    • Heart disease Sister      MEDS: reviewed  ALL: NKDA  ROS:  Constitutional: Negative for activity change, appetite change and fever.   HENT:  Negative for congestion and sore throat.    Eyes: Negative.    Respiratory: Positive for shortness of breath. Negative for cough.    Cardiovascular: Positive for chest pain (pressure). Negative for leg swelling.   Gastrointestinal: Positive for diarrhea (dark stool). Negative for abdominal pain and vomiting.   Endocrine: Negative.    Genitourinary: Negative for decreased urine volume and dysuria.   Musculoskeletal: Negative for neck pain.   Skin: Negative for rash and wound.   Allergic/Immunologic: Negative.    Neurological: Positive for light-headedness. Negative for weakness, numbness and headaches.   Hematological: Negative.    Psychiatric/Behavioral: Negative.    All other systems reviewed and are negative.    Vital Sign Min/Max for last 24 hours  Temp  Min: 98 °F (36.7 °C)  Max: 98 °F (36.7 °C)   BP  Min: 64/53  Max: 125/96   Pulse  Min: 97  Max: 132   Resp  Min: 16  Max: 18   SpO2  Min: 95 %  Max: 100 %   Flow (L/min)  Min: 15  Max: 15   Weight  Min: 95.3 kg (210 lb)  Max: 95.3 kg (210 lb)     GEN:   appears ill, , AxOx3  HEENT: PERRL, EOMI, no icterus, mmm, no jvd, trachea midline, neck supple  CHEST: CTA bilat, no wheezes, no crackles, no use of accessory muscles  CV: tachy, regular, no m/g/r  ABD: soft, nt, nd +bs, no hepatosplenomegaly  EXT: no c/c/e  SKIN: no rashes, no xanthomas, nl turgor  LYMPH: no palpable cervical or supraclavicular lymphadenopathy  NEURO: CN 2-12 intact and symmetric bilaterally  PSYCH: nl affect, nl orientation, nl judgement, nl mood  MSK: no kyphoscoliosis, 5/5 strength ue and le bilaterally    Labs:  Bun 45  Cr 1.13  Bicarb 20  Alt 52  Trop 0.01  Wbc 9.34  hgb 11.4  plts 247    CXR: NAD    A/P:  1. GI bleed -- protonix gtt, monitor H and H and observe in ICU -- GI to see; suspect upper bleed  2. Hypotension -- secondary to blood loss; better now  3. Acute blood loss anemia 2/2 gi bleed  4. Recent STEMI s/p stent -- will have to hold plavix for now  5. Chest pain -- monitor  troponin likely was 2/2 hypotension.   6. Syncope     CC 35 mins

## 2018-01-07 NOTE — ANESTHESIA POSTPROCEDURE EVALUATION
"Patient: Ruddy Griffin    Procedure Summary     Date Anesthesia Start Anesthesia Stop Room / Location    01/07/18 1611 1656  [facility] / [facility]       Procedure Diagnosis Surgeon Provider    ESOPHAGOGASTRODUODENOSCOPY (N/A Esophagus) No diagnosis on file. MD Mert Nam MD          Anesthesia Type: general  Last vitals  BP   157/86 (01/07/18 1700)   Temp   36.7 °C (98.1 °F) (01/07/18 1654)   Pulse   109 (01/07/18 1700)   Resp   15 (01/07/18 1700)     SpO2   98 % (01/07/18 1700)     Post Anesthesia Care and Evaluation    Patient location during evaluation: PACU  Patient participation: complete - patient participated  Level of consciousness: awake and alert  Pain management: adequate  Airway patency: patent  Anesthetic complications: No anesthetic complications    Cardiovascular status: acceptable  Respiratory status: acceptable  Hydration status: acceptable    Comments: /86 (BP Location: Right arm, Patient Position: Lying)  Pulse 109  Temp 36.7 °C (98.1 °F) (Oral)   Resp 15  Ht 175.3 cm (69\")  Wt 98.4 kg (216 lb 14.9 oz)  SpO2 98%  BMI 32.04 kg/m2      "

## 2018-01-07 NOTE — ANESTHESIA PREPROCEDURE EVALUATION
Anesthesia Evaluation     Patient summary reviewed and Nursing notes reviewed   NPO Solid Status: > 8 hours  NPO Liquid Status: > 8 hours     Airway   Mallampati: II  Neck ROM: full  possible difficult intubation  Dental - normal exam     Pulmonary     breath sounds clear to auscultation  (+) a smoker Former,   Cardiovascular     PT is on anticoagulation therapy  Patient on routine beta blocker and Beta blocker given within 24 hours of surgery  Rhythm: regular    (+) hypertension, past MI , CAD, cardiac stents within the past 12 months hyperlipidemia    ROS comment: 12/2017  Acute inferior STEMI complicated by VF, cardiogenic shock, and bradycardia, successfully treated with thrombectomy/LAUREN of the RCA.       Neuro/Psych  GI/Hepatic/Renal/Endo    (+) obesity,      Musculoskeletal     Abdominal   (+) obese,    Substance History   (+) drug use     OB/GYN          Other                                                  Anesthesia Plan    ASA 4     general     intravenous induction   Anesthetic plan and risks discussed with patient.    Plan discussed with CRNA.

## 2018-01-07 NOTE — CONSULTS
[facility]  Initial Inpatient Consult Note    Referring Provider: Dr Raul Gann    Reason for Consultation: UGIB    Subjective     History of present illness:    42 y.o. male with history of CAD with stent placed 12/17/17 on plavix admitted with dark stools that began yesterday.  He was lightlheaded and had chest pain and soa - initial BP 75/46 with tachycardia.  Stool heme positive in ER.  Initial hemoglobin 11.4 - dropped to 9.7 overnight.    He denies any NSAIDs except for his baby aspirin.  He awoke yesterday morning feeling ill.  He did not eat anything.  His stomach was upset.  He had a dark bowel movement and went back to bed later that day.  When he awoke again he was short of breath and dizzy and had a syncopal episode at home.  His blood pressure improved with fluids.  He has not been transfused.  He is able to stand up without feeling dizzy at this point.  He has no chest pain or trouble breathing.  Denies abdominal pain or nausea.    Past Medical History:  Past Medical History:   Diagnosis Date   • Coronary artery disease    • Hyperlipidemia    • Hypertension    • ST elevation myocardial infarction (STEMI) 12/17/2017     Past Surgical History:  Past Surgical History:   Procedure Laterality Date   • APPENDECTOMY     • CARDIAC CATHETERIZATION N/A 12/17/2017    Procedure: Left Heart Cath;  Surgeon: Nick Sahu MD;  Location: [facility] INVASIVE LOCATION;  Service:    • CARDIAC CATHETERIZATION N/A 12/17/2017    Procedure: Stent LAUREN coronary;  Surgeon: Nick Sahu MD;  Location: [facility] CATH INVASIVE LOCATION;  Service:    • CARDIAC CATHETERIZATION  12/17/2017    Procedure: Percutaneous Manual Thrombectomy;  Surgeon: Ncik Sahu MD;  Location: [facility] CATH INVASIVE LOCATION;  Service:    • CORONARY STENT PLACEMENT        Social History:   Social History   Substance Use Topics   • Smoking status: Former Smoker     Packs/day: 1.00     Years: 15.00     Quit date: 12/17/2017   • Smokeless  tobacco: Never Used   • Alcohol use 5.4 - 7.2 oz/week     9 - 12 Cans of beer per week      Comment: reports 9-12 beers every three days or so       Family History:  Family History   Problem Relation Age of Onset   • Heart disease Father    • Heart attack Father    • Heart attack Sister    • Heart disease Sister        Home Meds:  Prescriptions Prior to Admission   Medication Sig Dispense Refill Last Dose   • aspirin 81 MG chewable tablet Chew 1 tablet Daily. 30 tablet 5 Taking   • atorvastatin (LIPITOR) 20 MG tablet Take 1 tablet by mouth Daily. 30 tablet 11 Taking   • carvedilol (COREG) 6.25 MG tablet Take 1 tablet by mouth 2 (two) times a day with meals. 60 tablet 0 Taking   • clopidogrel (PLAVIX) 75 MG tablet Take 1 tablet by mouth Daily. 30 tablet 5 Taking   • cyclobenzaprine (FLEXERIL) 10 MG tablet Take 1 tablet by mouth 3 (three) times a day as needed for muscle spasms. 21 tablet 0 Taking   • HYDROcodone-acetaminophen (NORCO) 7.5-325 MG per tablet Take 1 tablet by mouth every 6 (six) hours as needed for moderate pain (4-6). 14 tablet 0 Taking     Current Meds:      Allergies:  No Known Allergies  Review of Systems  Pertinent items are noted in HPI, all other systems reviewed and negative     Objective     Vital Signs  Temp:  [98 °F (36.7 °C)-98.5 °F (36.9 °C)] 98 °F (36.7 °C)  Heart Rate:  [] 98  Resp:  [16-18] 16  BP: ()/(36-96) 109/84  Physical Exam:  General Appearance:    Alert, cooperative, in no acute distress   Head:    Normocephalic, without obvious abnormality, atraumatic   Eyes:            Lids and lashes normal, conjunctivae and sclerae normal, no   icterus   Throat:   No oral lesions, no thrush, oral mucosa dry       Lungs:     Clear to auscultation,respirations regular, even and                   unlabored    Heart:    Regular rhythm and normal rate, normal S1 and S2, no            Murmur    Chest Wall:    No abnormalities observed   Abdomen:     Normal bowel sounds, no masses, no  organomegaly, soft        non-tender, non-distended, no guarding, no rebound                 tenderness   Rectal:     Deferred   Extremities:   no edema    Skin:   No bleeding, bruising or rash       Psychiatric:  Judgement and insight: normal   Orientation to person place and time: normal   Mood and affect: normal   Results Review:   I reviewed the patient's new clinical results.      Results from last 7 days  Lab Units 01/07/18  0023 01/06/18  1845   WBC 10*3/mm3  --  9.34   HEMOGLOBIN g/dL 9.7* 11.4*   HEMATOCRIT % 30.7* 35.5*   PLATELETS 10*3/mm3  --  247       Results from last 7 days  Lab Units 01/06/18  1939   SODIUM mmol/L 141   POTASSIUM mmol/L 4.0   CHLORIDE mmol/L 106   CO2 mmol/L 20.1*   BUN mg/dL 45*   CREATININE mg/dL 1.13   CALCIUM mg/dL 8.3*   BILIRUBIN mg/dL 0.4   ALK PHOS U/L 53   ALT (SGPT) U/L 52*   AST (SGOT) U/L 18   GLUCOSE mg/dL 105*           Lab Results  Lab Value Date/Time   LIPASE 33 12/03/2014 1645       Radiology:  XR Chest 1 View             Assessment/Plan   Patient Active Problem List   Diagnosis   • ST elevation myocardial infarction (STEMI)   • Coronary artery disease involving native coronary artery of native heart without angina pectoris   • Essential hypertension   • Hyperlipidemia   • Upper GI bleed     Impression-  1.  Upper GI bleed  2.  Acute blood loss anemia  3.  ST elevation MI with drug-eluting stent placed 12/17/17    Plan-  Continue Protonix drip  He is now hemodynamically stable-his blood pressure improved with fluids  We'll plan for a bedside EGD today for further evaluation  Continue to hold Plavix-last dose yesterday    I discussed the patients findings and my recommendations with patient and nursing staff.    Francesca Pascual MD

## 2018-01-07 NOTE — ANESTHESIA PROCEDURE NOTES
Airway  Urgency: elective    Airway not difficult    General Information and Staff    Patient location during procedure: OR  Anesthesiologist: JASPAL GANNON    Indications and Patient Condition  Indications for airway management: airway protection    Preoxygenated: yes  MILS maintained throughout  Mask difficulty assessment: 2 - vent by mask + OA or adjuvant +/- NMBA    Final Airway Details  Final airway type: endotracheal airway      Successful airway: ETT  Cuffed: yes   Successful intubation technique: direct laryngoscopy  Facilitating devices/methods: intubating stylet  Endotracheal tube insertion site: oral  Blade: Caroline  Blade size: #4  ETT size: 8.0 mm  Cormack-Lehane Classification: grade IIb - view of arytenoids or posterior of glottis only  Placement verified by: chest auscultation and capnometry   Measured from: teeth  ETT to teeth (cm): 22  Number of attempts at approach: 1

## 2018-01-08 LAB
ANION GAP SERPL CALCULATED.3IONS-SCNC: 12.4 MMOL/L
BUN BLD-MCNC: 10 MG/DL (ref 6–20)
BUN/CREAT SERPL: 12.7 (ref 7–25)
CALCIUM SPEC-SCNC: 9 MG/DL (ref 8.6–10.5)
CHLORIDE SERPL-SCNC: 106 MMOL/L (ref 98–107)
CO2 SERPL-SCNC: 22.6 MMOL/L (ref 22–29)
CREAT BLD-MCNC: 0.79 MG/DL (ref 0.76–1.27)
GFR SERPL CREATININE-BSD FRML MDRD: 108 ML/MIN/1.73
GLUCOSE BLD-MCNC: 124 MG/DL (ref 65–99)
HCT VFR BLD AUTO: 24.4 % (ref 40.4–52.2)
HCT VFR BLD AUTO: 24.7 % (ref 40.4–52.2)
HCT VFR BLD AUTO: 27.1 % (ref 40.4–52.2)
HGB BLD-MCNC: 7.9 G/DL (ref 13.7–17.6)
HGB BLD-MCNC: 8.1 G/DL (ref 13.7–17.6)
HGB BLD-MCNC: 8.7 G/DL (ref 13.7–17.6)
POTASSIUM BLD-SCNC: 4 MMOL/L (ref 3.5–5.2)
SODIUM BLD-SCNC: 141 MMOL/L (ref 136–145)

## 2018-01-08 PROCEDURE — 85014 HEMATOCRIT: CPT | Performed by: INTERNAL MEDICINE

## 2018-01-08 PROCEDURE — 85018 HEMOGLOBIN: CPT | Performed by: INTERNAL MEDICINE

## 2018-01-08 PROCEDURE — 80048 BASIC METABOLIC PNL TOTAL CA: CPT | Performed by: INTERNAL MEDICINE

## 2018-01-08 PROCEDURE — 87338 HPYLORI STOOL AG IA: CPT | Performed by: INTERNAL MEDICINE

## 2018-01-08 PROCEDURE — 99232 SBSQ HOSP IP/OBS MODERATE 35: CPT | Performed by: INTERNAL MEDICINE

## 2018-01-08 RX ORDER — ASPIRIN 81 MG/1
81 TABLET ORAL DAILY
Status: DISCONTINUED | OUTPATIENT
Start: 2018-01-08 | End: 2018-01-10 | Stop reason: HOSPADM

## 2018-01-08 RX ORDER — CLOPIDOGREL BISULFATE 75 MG/1
75 TABLET ORAL DAILY
Status: DISCONTINUED | OUTPATIENT
Start: 2018-01-08 | End: 2018-01-10 | Stop reason: HOSPADM

## 2018-01-08 RX ORDER — CARVEDILOL 6.25 MG/1
6.25 TABLET ORAL 2 TIMES DAILY WITH MEALS
Status: DISCONTINUED | OUTPATIENT
Start: 2018-01-08 | End: 2018-01-10 | Stop reason: HOSPADM

## 2018-01-08 RX ORDER — ZOLPIDEM TARTRATE 5 MG/1
5 TABLET ORAL NIGHTLY PRN
Status: DISCONTINUED | OUTPATIENT
Start: 2018-01-08 | End: 2018-01-10 | Stop reason: HOSPADM

## 2018-01-08 RX ORDER — ATORVASTATIN CALCIUM 20 MG/1
20 TABLET, FILM COATED ORAL DAILY
Status: DISCONTINUED | OUTPATIENT
Start: 2018-01-08 | End: 2018-01-10

## 2018-01-08 RX ADMIN — ATORVASTATIN CALCIUM 20 MG: 20 TABLET, FILM COATED ORAL at 12:49

## 2018-01-08 RX ADMIN — SODIUM CHLORIDE 8 MG/HR: 900 INJECTION INTRAVENOUS at 22:52

## 2018-01-08 RX ADMIN — SODIUM CHLORIDE 8 MG/HR: 900 INJECTION INTRAVENOUS at 12:49

## 2018-01-08 RX ADMIN — HYDROCODONE BITARTRATE AND ACETAMINOPHEN 1 TABLET: 10; 325 TABLET ORAL at 11:03

## 2018-01-08 RX ADMIN — SUCRALFATE 1 G: 1 SUSPENSION ORAL at 12:49

## 2018-01-08 RX ADMIN — ASPIRIN 81 MG: 81 TABLET ORAL at 16:38

## 2018-01-08 RX ADMIN — HYDROCODONE BITARTRATE AND ACETAMINOPHEN 1 TABLET: 10; 325 TABLET ORAL at 18:15

## 2018-01-08 RX ADMIN — SUCRALFATE 1 G: 1 SUSPENSION ORAL at 06:34

## 2018-01-08 RX ADMIN — HYDROCODONE BITARTRATE AND ACETAMINOPHEN 1 TABLET: 10; 325 TABLET ORAL at 04:30

## 2018-01-08 RX ADMIN — CLOPIDOGREL 75 MG: 75 TABLET, FILM COATED ORAL at 16:39

## 2018-01-08 RX ADMIN — SODIUM CHLORIDE 8 MG/HR: 900 INJECTION INTRAVENOUS at 07:24

## 2018-01-08 RX ADMIN — SODIUM CHLORIDE 8 MG/HR: 900 INJECTION INTRAVENOUS at 02:09

## 2018-01-08 RX ADMIN — SUCRALFATE 1 G: 1 SUSPENSION ORAL at 23:39

## 2018-01-08 RX ADMIN — ZOLPIDEM TARTRATE 5 MG: 5 TABLET ORAL at 23:30

## 2018-01-08 RX ADMIN — CARVEDILOL 6.25 MG: 6.25 TABLET, FILM COATED ORAL at 18:15

## 2018-01-08 RX ADMIN — SUCRALFATE 1 G: 1 SUSPENSION ORAL at 18:15

## 2018-01-08 RX ADMIN — SODIUM CHLORIDE 125 ML/HR: 9 INJECTION, SOLUTION INTRAVENOUS at 01:09

## 2018-01-08 RX ADMIN — SUCRALFATE 1 G: 1 SUSPENSION ORAL at 00:00

## 2018-01-08 RX ADMIN — SODIUM CHLORIDE 8 MG/HR: 900 INJECTION INTRAVENOUS at 18:36

## 2018-01-08 NOTE — PROGRESS NOTES
"  Daily Progress Note.   [facility] CORONARY CARE  1/8/2018    Patient:  Name:  Ruddy Griffin  MRN:  [id number]  1975  42 y.o.  male         Interval History:  Doing well post procedure.  No chest pain.    Physical Exam:  /97  Pulse 98  Temp 97.7 °F (36.5 °C)  Resp 18  Ht 175.3 cm (69\")  Wt 98.4 kg (216 lb 14.9 oz)  SpO2 95%  BMI 32.04 kg/m2  Body mass index is 32.04 kg/(m^2).    Intake/Output Summary (Last 24 hours) at 01/08/18 1143  Last data filed at 01/08/18 0900   Gross per 24 hour   Intake             7197 ml   Output             6930 ml   Net              267 ml     GENERAL:  NAD, Aox3  HEENT:  EOMI, nonicteric sclera, no JVD  PULMONARY:    CTAB, no wheeze, no crackles, no rhonchi, symmetric air entry  CARDIAC:  RRR no MRG, S1 S2  ABD: SNTND BS+  EXT: no c/c/e, pulses symmetric 2+ bilaterally  NEURO:  CNs II-XII intact, no focal deficits  SKIN: no lesions, no rash  PSYCH: appropriate mood  LYMPH: no cervical LAD    Scheduled meds:      sucralfate 1 g Oral Q6H       Data Review:   I reviewed the patient's medications, imaging and new clinical results.    Results from last 7 days  Lab Units 01/08/18  0808 01/08/18  0009 01/07/18  1751 01/07/18  0832 01/07/18  0023 01/06/18  1845   WBC 10*3/mm3  --   --   --   --   --  9.34   HEMOGLOBIN g/dL 8.1* 8.7* 8.9* 9.0* 9.7* 11.4*   PLATELETS 10*3/mm3  --   --   --   --   --  247       Results from last 7 days  Lab Units 01/08/18  0808 01/06/18  1939   SODIUM mmol/L 141 141   POTASSIUM mmol/L 4.0 4.0   CHLORIDE mmol/L 106 106   CO2 mmol/L 22.6 20.1*   BUN mg/dL 10 45*   CREATININE mg/dL 0.79 1.13   GLUCOSE mg/dL 124* 105*   CALCIUM mg/dL 9.0 8.3*   Estimated Creatinine Clearance: 142.4 mL/min (by C-G formula based on Cr of 0.79).    Imaging Results (most recent)     Procedure Component Value Units Date/Time    XR Chest 1 View [[id number]] Collected:  01/06/18 1929     Updated:  01/06/18 1929    Narrative:       PORTABLE CHEST   "   HISTORY: Chest pain.     FINDINGS:  A single view of the chest demonstrates the heart to be  within normal limits in size. There is no evidence of focal infiltrate,  effusion or of congestive failure.             ASSESSMENT  /  PLAN:  UGIB  Hypotension resolved  ABLA - - gib  STEMI s/p stent recently - plavix on hold until cleared by GI/GIB resolved/addressed  cad    S/p EGD with visble vessel in fundus injected and clipped on ppi gtt  To floor  Serial hh  plavix per Gi/cards  Diet per gi noted        Kb Gann MD  [facility]  01/08/18

## 2018-01-08 NOTE — PAYOR COMM NOTE
"Ruddy Moore (42 y.o. Male)                    Attention ; Kandis, Clinical for your review, reply to ur dept, Ninoska Rodrigez N  or ur fax                [phone] / patient admitted to Coronary Care Unit.       Date of Birth Social Security Number Address Home Phone MRN    [id number]  [id number] [facility] [address] [phone] [id number]    Nondenominational Marital Status          None Single       Admission Date Admission Type Admitting Provider Attending Provider Department, Room/Bed    1/6/18 Emergency Raul Gann MD Kellie, Scott P, MD [facility] CORONARY CARE, 325/1    Discharge Date Discharge Disposition Discharge Destination                      Attending Provider: Raul Gann MD     Allergies:  No Known Allergies    Isolation:  None   Infection:  None   Code Status:  Prior    Ht:  175.3 cm (69\")   Wt:  98.4 kg (216 lb 14.9 oz)    Admission Cmt:  None   Principal Problem:  None                Active Insurance as of 1/6/2018     Primary Coverage     Payor Plan Insurance Group Employer/Plan Group    P[employer]ORT [employer]ORT MEDICAID     Payor Plan Address Payor Plan Phone Number Effective From Effective To    [address] [phone] 9/1/2014     [place] [address]       Subscriber Name Subscriber Birth Date Member ID       RUDDY MOORE 1975 [id number]                 Emergency Contacts      (Rel.) Home Phone Work Phone Mobile Phone    Kimberlee Steiner (Sister) -- -- [phone]    Ngoc Bond (Other) -- -- [phone]               History & Physical      Raul Gann MD at 1/6/2018  9:37 PM          [facility]    CC: dark, loose stools    HPI:  Mr. Moore is a 43yo WM with a recent MI s/p stenting on plavix.  He presented to the ER today with sudden onset of 3 episodes of dark, loose stool that he first noticed earlier today.  He has had some associated light headedness, chest pressure and shortness of " breath.  He was hypotensive on the way to the ER.  He has never had a gastric ulcer to his knowledge.  He notices no alleviating or exacerbating factors.  He has decrease in Hgb found on labs to day in ER.  BUN is elevated.      He said he did have some abdominal pain last night, but has none now.  He says he had about 9 beers yesterday.    He said I don't have to worry about him going into alcohol withdrawal.  Doesn't look like he did when he was here with AMI.  He does have chronic back pain but best I can tell he has not been taking NSAIDS for this, he says he was told not to take NSAIDS    Past Medical History:   Diagnosis Date   • Coronary artery disease    • Hyperlipidemia    • Hypertension    • ST elevation myocardial infarction (STEMI) 12/17/2017     Social History     Social History   • Marital status: Single     Spouse name: N/A   • Number of children: N/A   • Years of education: N/A     Social History Main Topics   • Smoking status: Former Smoker     Packs/day: 1.00     Years: 15.00     Quit date: 12/17/2017   • Smokeless tobacco: Never Used   • Alcohol use 5.4 - 7.2 oz/week     9 - 12 Cans of beer per week      Comment: reports 9-12 beers every three days or so    • Drug use: Yes     Special: Marijuana      Comment: occ   • Sexual activity: Defer     Other Topics Concern   • None     Social History Narrative   • None     Family History   Problem Relation Age of Onset   • Heart disease Father    • Heart attack Father    • Heart attack Sister    • Heart disease Sister      MEDS: reviewed  ALL: NKDA  ROS:  Constitutional: Negative for activity change, appetite change and fever.   HENT: Negative for congestion and sore throat.    Eyes: Negative.    Respiratory: Positive for shortness of breath. Negative for cough.    Cardiovascular: Positive for chest pain (pressure). Negative for leg swelling.   Gastrointestinal: Positive for diarrhea (dark stool). Negative for abdominal pain and vomiting.   Endocrine:  Negative.    Genitourinary: Negative for decreased urine volume and dysuria.   Musculoskeletal: Negative for neck pain.   Skin: Negative for rash and wound.   Allergic/Immunologic: Negative.    Neurological: Positive for light-headedness. Negative for weakness, numbness and headaches.   Hematological: Negative.    Psychiatric/Behavioral: Negative.    All other systems reviewed and are negative.    Vital Sign Min/Max for last 24 hours  Temp  Min: 98 °F (36.7 °C)  Max: 98 °F (36.7 °C)   BP  Min: 64/53  Max: 125/96   Pulse  Min: 97  Max: 132   Resp  Min: 16  Max: 18   SpO2  Min: 95 %  Max: 100 %   Flow (L/min)  Min: 15  Max: 15   Weight  Min: 95.3 kg (210 lb)  Max: 95.3 kg (210 lb)     GEN:   appears ill, , AxOx3  HEENT: PERRL, EOMI, no icterus, mmm, no jvd, trachea midline, neck supple  CHEST: CTA bilat, no wheezes, no crackles, no use of accessory muscles  CV: tachy, regular, no m/g/r  ABD: soft, nt, nd +bs, no hepatosplenomegaly  EXT: no c/c/e  SKIN: no rashes, no xanthomas, nl turgor  LYMPH: no palpable cervical or supraclavicular lymphadenopathy  NEURO: CN 2-12 intact and symmetric bilaterally  PSYCH: nl affect, nl orientation, nl judgement, nl mood  MSK: no kyphoscoliosis, 5/5 strength ue and le bilaterally    Labs:  Bun 45  Cr 1.13  Bicarb 20  Alt 52  Trop 0.01  Wbc 9.34  hgb 11.4  plts 247    CXR: NAD    A/P:  1. GI bleed -- protonix gtt, monitor H and H and observe in ICU -- GI to see; suspect upper bleed  2. Hypotension -- secondary to blood loss; better now  3. Acute blood loss anemia 2/2 gi bleed  4. Recent STEMI s/p stent -- will have to hold plavix for now  5. Chest pain -- monitor troponin likely was 2/2 hypotension.   6. Syncope     CC 35 mins      Electronically signed by Raul Gann MD at 1/6/2018  9:51 PM           Emergency Department Notes      Jean Paul Carter RN at 1/6/2018  6:28 PM          PT HAD SYNCOPAL EPISODE TODAY REPORTS DIARRHEA ALL DAY AND BM DURING SYNCOPAL EPISODE, EMS CALLED AHEAD  "FOR SOA AND 62/PALP BP.      Electronically signed by Jean Paul Carter RN at 1/6/2018  6:29 PM      Jean Paul Carter RN at 1/6/2018  6:30 PM          PT REPORTS SOA AND SLIGHT PRESSURE ON CHEST 4/10     Electronically signed by Jean Paul Cartre RN at 1/6/2018  6:31 PM      Jean Paul Carter RN at 1/6/2018  6:32 PM          PT STATES \"I FEEL LIKE I AM GOING TO PASS OUT AGAIN.\"     Electronically signed by Jean Paul Carter RN at 1/6/2018  6:32 PM      Jean Paul Carter RN at 1/6/2018  6:36 PM          EYES ROLLED BACK IN HEAD - PT NOT ANSWERING QUESTIONS - BAKER AT BEDSIDE.  PT CONSCIOUS - ANSWERING QUESTIONS WITHIN 1 MINUTE. PT PALE AND DIAPHORETIC, PUPILS DILATED.      Jean Paul Carter RN  01/06/18 1837       Jean Paul Carter RN  01/06/18 1848       Electronically signed by Jean Paul Carter RN at 1/6/2018  6:48 PM        Vital Signs (last 72 hrs)       01/05 0700  -  01/06 0659 01/06 0700  -  01/07 0659 01/07 0700  -  01/08 0659 01/08 0700  -  01/08 0915   Most Recent    Temp (°F)   98 -  98.5    97.5 -  98.8      97.7     97.7 (36.5)    Heart Rate   89 -  (!)132    86 -  112       89    Resp   16 -  18    14 -  20       18    BP   (!)64/53 -  127/86    97/67 -  157/86       133/87    SpO2 (%)   91 -  100    94 -  100       95          Lines, Drains & Airways    Active LDAs     Name:   Placement date:   Placement time:   Site:   Days:    Peripheral IV Line - Single Lumen 01/06/18 1837 basilic vein (medial side of arm), right 20 gauge  01/06/18    1837      1    Peripheral IV Line - Single Lumen 01/06/18 1842 metacarpal vein (top of hand), left 20 gauge  01/06/18    1842      1         Inactive LDAs     Name:   Placement date:   Placement time:   Removal date:   Removal time:   Site:   Days:    [REMOVED] Peripheral IV Line - Single Lumen 01/06/18 1842 metacarpal vein (top of hand), right 20 gauge  01/06/18    1842    01/06/18    2258      less than 1    [REMOVED] Airway 01/07/18 1625  01/07/18    1625 created via procedure documentation  " "01/07/18    1646      less than 1                Hospital Medications (all)       Dose Frequency Start End    famotidine (PEPCID) injection 20 mg 20 mg Once 1/7/2018 1/7/2018    Sig - Route: Infuse 2 mL into a venous catheter 1 (One) Time. - Intravenous    fentaNYL citrate (PF) (SUBLIMAZE) 100 MCG/2ML injection  - ADS Override Pull   1/7/2018 1/7/2018    Notes to Pharmacy: Created by cabinet override    HYDROcodone-acetaminophen (NORCO)  MG per tablet 1 tablet 1 tablet Every 4 Hours PRN 1/6/2018 1/16/2018    Sig - Route: Take 1 tablet by mouth Every 4 (Four) Hours As Needed for Moderate Pain . - Oral    morphine injection 2 mg 2 mg Every 4 Hours PRN 1/6/2018 1/16/2018    Sig - Route: Infuse 1 mL into a venous catheter Every 4 (Four) Hours As Needed for Severe Pain . - Intravenous    pantoprazole (PROTONIX) 40 mg/100 mL (0.4 mg/mL) in 0.9% NS IVPB 8 mg/hr Continuous 1/6/2018     Sig - Route: Infuse 8 mg/hr into a venous catheter Continuous. - Intravenous    Linked Group 1:  \"And\" Linked Group Details        pantoprazole (PROTONIX) injection 80 mg 80 mg Once 1/6/2018 1/6/2018    Sig - Route: Infuse 20 mL into a venous catheter 1 (One) Time. - Intravenous    Linked Group 1:  \"And\" Linked Group Details        promethazine (PHENERGAN) injection 6.25 mg 6.25 mg Once 1/6/2018 1/6/2018    Sig - Route: Infuse 0.25 mL into a venous catheter 1 (One) Time. - Intravenous    sodium chloride 0.9 % bolus 1,000 mL 1,000 mL Once 1/6/2018 1/6/2018    Sig - Route: Infuse 1,000 mL into a venous catheter 1 (One) Time. - Intravenous    sodium chloride 0.9 % bolus 1,000 mL 1,000 mL Once 1/6/2018 1/6/2018    Sig - Route: Infuse 1,000 mL into a venous catheter 1 (One) Time. - Intravenous    sodium chloride 0.9 % flush 10 mL 10 mL As Needed 1/6/2018     Sig - Route: Infuse 10 mL into a venous catheter As Needed for Line Care. - Intravenous    sodium chloride 0.9 % infusion 125 mL/hr Continuous 1/6/2018     Sig - Route: Infuse 125 mL/hr " into a venous catheter Continuous. - Intravenous    sucralfate (CARAFATE) 1 GM/10ML suspension 1 g 1 g Every 6 Hours Scheduled 1/7/2018     Sig - Route: Take 10 mL by mouth Every 6 (Six) Hours. - Oral    diphenhydrAMINE (BENADRYL) injection 12.5 mg (Discontinued) 12.5 mg Every 15 Minutes PRN 1/7/2018 1/7/2018    Sig - Route: Infuse 0.25 mL into a venous catheter Every 15 (Fifteen) Minutes As Needed for Itching (May repeat x 1). - Intravenous    Reason for Discontinue: Patient Transfer    ePHEDrine injection 5 mg (Discontinued) 5 mg Once As Needed 1/7/2018 1/7/2018    Sig - Route: Infuse 0.1 mL into a venous catheter 1 (One) Time As Needed (symptomatic hypotension - Notify attending anesthesiologist if this needs to be given). - Intravenous    Reason for Discontinue: Patient Transfer    fentaNYL citrate (PF) (SUBLIMAZE) injection 50 mcg (Discontinued) 50 mcg Every 10 Minutes PRN 1/7/2018 1/7/2018    Sig - Route: Infuse 1 mL into a venous catheter Every 10 (Ten) Minutes As Needed for Severe Pain . - Intravenous    fentaNYL citrate (PF) (SUBLIMAZE) injection 50 mcg (Discontinued) 50 mcg Every 5 Minutes PRN 1/7/2018 1/7/2018    Sig - Route: Infuse 1 mL into a venous catheter Every 5 (Five) Minutes As Needed for Moderate Pain  or Severe Pain . - Intravenous    Reason for Discontinue: Patient Transfer    flumazenil (ROMAZICON) injection 0.2 mg (Discontinued) 0.2 mg As Needed 1/7/2018 1/7/2018    Sig - Route: Infuse 2 mL into a venous catheter As Needed (for benzodiazepine induced unresponsiveness or sedation). - Intravenous    Reason for Discontinue: Patient Transfer    hydrALAZINE (APRESOLINE) injection 5 mg (Discontinued) 5 mg Every 10 Minutes PRN 1/7/2018 1/7/2018    Sig - Route: Infuse 0.25 mL into a venous catheter Every 10 (Ten) Minutes As Needed for High Blood Pressure (for systolic blood pressure greater than 180 mmHg or diastolic blood pressure greater than 105 mmHg). - Intravenous    Reason for Discontinue:  "Patient Transfer    HYDROcodone-acetaminophen (NORCO) 7.5-325 MG per tablet 1 tablet (Discontinued) 1 tablet Once As Needed 1/7/2018 1/7/2018    Sig - Route: Take 1 tablet by mouth 1 (One) Time As Needed for Mild Pain . - Oral    Reason for Discontinue: Patient Transfer    HYDROmorphone (DILAUDID) injection 0.5 mg (Discontinued) 0.5 mg Every 5 Minutes PRN 1/7/2018 1/7/2018    Sig - Route: Infuse 0.5 mL into a venous catheter Every 5 (Five) Minutes As Needed for Moderate Pain  or Severe Pain . - Intravenous    Reason for Discontinue: Patient Transfer    labetalol (NORMODYNE,TRANDATE) injection 5 mg (Discontinued) 5 mg Every 5 Minutes PRN 1/7/2018 1/7/2018    Sig - Route: Infuse 1 mL into a venous catheter Every 5 (Five) Minutes As Needed for High Blood Pressure (for systolic blood pressure greater than 180 mmHg or diastolic blood pressure greater than 105 mmHg). - Intravenous    Reason for Discontinue: Patient Transfer    lactated ringers infusion 1,000 mL (Discontinued) 1,000 mL Continuous PRN 1/7/2018 1/7/2018    Sig - Route: Infuse 1,000 mL into a venous catheter Continuous As Needed (Start Prior to Procedure). - Intravenous    lactated ringers infusion (Discontinued) 9 mL/hr Continuous 1/7/2018 1/7/2018    Sig - Route: Infuse 9 mL/hr into a venous catheter Continuous. - Intravenous    lidocaine (XYLOCAINE) 1 % injection 0.5 mL (Discontinued) 0.5 mL Once As Needed 1/7/2018 1/7/2018    Sig - Route: Inject 0.5 mL into the skin 1 (One) Time As Needed (Prior to IV Insertion). - Intradermal    midazolam (VERSED) injection 1 mg (Discontinued) 1 mg Every 5 Minutes PRN 1/7/2018 1/7/2018    Sig - Route: Infuse 1 mL into a venous catheter Every 5 (Five) Minutes As Needed for Anxiety (Max 4mg midazolam TOTAL). - Intravenous    Linked Group 2:  \"Or\" Linked Group Details        midazolam (VERSED) injection 2 mg (Discontinued) 2 mg Every 5 Minutes PRN 1/7/2018 1/7/2018    Sig - Route: Infuse 2 mL into a venous catheter Every 5 " "(Five) Minutes As Needed for Anxiety (Max 4mg midazolam TOTAL). - Intravenous    Linked Group 2:  \"Or\" Linked Group Details        naloxone (NARCAN) injection 0.2 mg (Discontinued) 0.2 mg As Needed 1/7/2018 1/7/2018    Sig - Route: Infuse 0.5 mL into a venous catheter As Needed for Opioid Reversal or Respiratory Depression (unresponsiveness, decrease oxygen saturation). - Intravenous    Reason for Discontinue: Patient Transfer    ondansetron (ZOFRAN) injection 4 mg (Discontinued) 4 mg Once As Needed 1/7/2018 1/7/2018    Sig - Route: Infuse 2 mL into a venous catheter 1 (One) Time As Needed for Nausea or Vomiting. - Intravenous    Reason for Discontinue: Patient Transfer    oxyCODONE-acetaminophen (PERCOCET) 7.5-325 MG per tablet 1 tablet (Discontinued) 1 tablet Once As Needed 1/7/2018 1/7/2018    Sig - Route: Take 1 tablet by mouth 1 (One) Time As Needed for Moderate Pain . - Oral    Reason for Discontinue: Patient Transfer    promethazine (PHENERGAN) injection 12.5 mg (Discontinued) 12.5 mg Once As Needed 1/7/2018 1/7/2018    Sig - Route: Infuse 0.5 mL into a venous catheter 1 (One) Time As Needed for Nausea or Vomiting. - Intravenous    Reason for Discontinue: Patient Transfer    Linked Group 3:  \"Or\" Linked Group Details        promethazine (PHENERGAN) injection 12.5 mg (Discontinued) 12.5 mg Once As Needed 1/7/2018 1/7/2018    Sig - Route: Inject 0.5 mL into the shoulder, thigh, or buttocks 1 (One) Time As Needed for Nausea or Vomiting. - Intramuscular    Reason for Discontinue: Patient Transfer    Linked Group 3:  \"Or\" Linked Group Details        promethazine (PHENERGAN) suppository 25 mg (Discontinued) 25 mg Once As Needed 1/7/2018 1/7/2018    Sig - Route: Insert 1 suppository into the rectum 1 (One) Time As Needed for Nausea or Vomiting. - Rectal    Reason for Discontinue: Patient Transfer    Linked Group 3:  \"Or\" Linked Group Details        promethazine (PHENERGAN) tablet 12.5 mg (Discontinued) 12.5 mg Once " "As Needed 1/7/2018 1/7/2018    Sig - Route: Take 0.5 tablets by mouth 1 (One) Time As Needed for Nausea or Vomiting. - Oral    Reason for Discontinue: Patient Transfer    promethazine (PHENERGAN) tablet 25 mg (Discontinued) 25 mg Once As Needed 1/7/2018 1/7/2018    Sig - Route: Take 1 tablet by mouth 1 (One) Time As Needed for Nausea or Vomiting. - Oral    Reason for Discontinue: Patient Transfer    Linked Group 3:  \"Or\" Linked Group Details        sodium chloride 0.9 % flush 1-10 mL (Discontinued) 1-10 mL As Needed 1/7/2018 1/7/2018    Sig - Route: Infuse 1-10 mL into a venous catheter As Needed for Line Care. - Intravenous    sodium chloride 0.9 % flush 3 mL (Discontinued) 3 mL As Needed 1/7/2018 1/7/2018    Sig - Route: Infuse 3 mL into a venous catheter As Needed for Line Care. - Intravenous    sodium chloride 9 mL with EPINEPHrine PF 1 mL mixture (Discontinued)  As Needed 1/7/2018 1/7/2018    Sig: As Needed.    Reason for Discontinue: Patient Discharge    sodium chloride 9 mL, EPINEPHrine PF 1 mL mixture (Discontinued)  As Needed 1/7/2018 1/7/2018    Sig: As Needed.    Reason for Discontinue: Patient Discharge          Orders (last 72 hrs)     Start     Ordered    01/08/18 0853  Diet Full Liquid; Cardiac  Diet Effective Now     Comments:  Ok to have some scrambled eggs    01/08/18 0852    01/08/18 0600  Basic Metabolic Panel  Morning Draw      01/07/18 1728    01/07/18 1930  sucralfate (CARAFATE) 1 GM/10ML suspension 1 g  Every 6 Hours Scheduled      01/07/18 1728    01/07/18 1729  Diet Clear Liquid  Diet Effective Now,   Status:  Canceled      01/07/18 1728    01/07/18 1729  H. Pylori Antigen, Stool - Stool, Per Rectum  Once      01/07/18 1728    01/07/18 1718  Vital signs every 5 minutes for 15 minutes, every 15 minutes thereafter.  Once,   Status:  Canceled      01/07/18 1717    01/07/18 1718  Call Anesthesiologist for additional IV Fluid bolus for Hypotension/Tachycardia  Until Discontinued,   Status:  " Canceled      01/07/18 1717 01/07/18 1718  Notify Anesthesia of Any Acute Changes in Patient Condition  Until Discontinued,   Status:  Canceled      01/07/18 1717    01/07/18 1718  Notify Anesthesia for Unrelieved Pain  Until Discontinued,   Status:  Canceled      01/07/18 1717    01/07/18 1718  Oxygen Therapy- Nasal Cannula; 2 LPM; Titrate for SPO2: equal to or greater than, per policy, 90%  Continuous      01/07/18 1717 01/07/18 1718  Pulse Oximetry, Continuous  Continuous      01/07/18 1717 01/07/18 1718  Once DC criteria to floor met, follow surgeon's orders.  Until Discontinued,   Status:  Canceled      01/07/18 1717 01/07/18 1718  Discharge patient from PACU when discharge criteria is met.  Until Discontinued,   Status:  Canceled      01/07/18 1717 01/07/18 1717  POC Glucose PRN  As Needed,   Status:  Canceled     Comments:  On all diabetic patients...Notify Anesthesia if blood sugar is less than 80 mg/dL or greater than 220 mg/dL    01/07/18 1717 01/07/18 1717  oxyCODONE-acetaminophen (PERCOCET) 7.5-325 MG per tablet 1 tablet  Once As Needed,   Status:  Discontinued      01/07/18 1717 01/07/18 1717  HYDROcodone-acetaminophen (NORCO) 7.5-325 MG per tablet 1 tablet  Once As Needed,   Status:  Discontinued      01/07/18 1717 01/07/18 1717  promethazine (PHENERGAN) tablet 12.5 mg  Once As Needed,   Status:  Discontinued      01/07/18 1717 01/07/18 1717  HYDROmorphone (DILAUDID) injection 0.5 mg  Every 5 Minutes PRN,   Status:  Discontinued      01/07/18 1717 01/07/18 1717  fentaNYL citrate (PF) (SUBLIMAZE) injection 50 mcg  Every 5 Minutes PRN,   Status:  Discontinued      01/07/18 1717 01/07/18 1717  labetalol (NORMODYNE,TRANDATE) injection 5 mg  Every 5 Minutes PRN,   Status:  Discontinued      01/07/18 1717 01/07/18 1717  hydrALAZINE (APRESOLINE) injection 5 mg  Every 10 Minutes PRN,   Status:  Discontinued      01/07/18 1717    01/07/18 1717  ePHEDrine injection 5 mg  Once  As Needed,   Status:  Discontinued      01/07/18 1717    01/07/18 1717  naloxone (NARCAN) injection 0.2 mg  As Needed,   Status:  Discontinued      01/07/18 1717    01/07/18 1717  flumazenil (ROMAZICON) injection 0.2 mg  As Needed,   Status:  Discontinued      01/07/18 1717    01/07/18 1717  ondansetron (ZOFRAN) injection 4 mg  Once As Needed,   Status:  Discontinued      01/07/18 1717    01/07/18 1717  promethazine (PHENERGAN) injection 12.5 mg  Once As Needed,   Status:  Discontinued      01/07/18 1717 01/07/18 1717  promethazine (PHENERGAN) injection 12.5 mg  Once As Needed,   Status:  Discontinued      01/07/18 1717 01/07/18 1717  promethazine (PHENERGAN) suppository 25 mg  Once As Needed,   Status:  Discontinued      01/07/18 1717 01/07/18 1717  promethazine (PHENERGAN) tablet 25 mg  Once As Needed,   Status:  Discontinued      01/07/18 1717    01/07/18 1717  diphenhydrAMINE (BENADRYL) injection 12.5 mg  Every 15 Minutes PRN,   Status:  Discontinued      01/07/18 1717    01/07/18 1642  sodium chloride 9 mL, EPINEPHrine PF 1 mL mixture  As Needed,   Status:  Discontinued      01/07/18 1643    01/07/18 1633  sodium chloride 9 mL with EPINEPHrine PF 1 mL mixture  As Needed,   Status:  Discontinued      01/07/18 1634    01/07/18 1603  fentaNYL citrate (PF) (SUBLIMAZE) 100 MCG/2ML injection  - ADS Override Pull     Comments:  Created by cabinet override    01/07/18 1603    01/07/18 1515  lactated ringers infusion  Continuous,   Status:  Discontinued      01/07/18 1438    01/07/18 1515  famotidine (PEPCID) injection 20 mg  Once      01/07/18 1438    01/07/18 1439  Oxygen Therapy- Nasal Cannula; Titrate for SPO2: equal to or greater than, 90%  Continuous,   Status:  Canceled      01/07/18 1438    01/07/18 1439  Pulse Oximetry, Continuous  Continuous,   Status:  Canceled      01/07/18 1438    01/07/18 1439  Insert Peripheral IV  Once,   Status:  Canceled      01/07/18 1438    01/07/18 1439  Saline Lock &  Maintain IV Access  Continuous,   Status:  Canceled      01/07/18 1438    01/07/18 1439  May take Beta Blocker from home with sip of water.  Once,   Status:  Canceled     Comments:  Only applicable to patients on home Beta Blocker    01/07/18 1438    01/07/18 1439  Pulse Oximetry, Continuous  Continuous,   Status:  Canceled      01/07/18 1438    01/07/18 1439  POC Glucose Once  Once,   Status:  Canceled     Comments:  For all diabetic patients. Notify Anesthesiologist for blood sugar greater than 180 or less than 60..    01/07/18 1438    01/07/18 1438  fentaNYL citrate (PF) (SUBLIMAZE) injection 50 mcg  Every 10 Minutes PRN,   Status:  Discontinued      01/07/18 1438    01/07/18 1438  midazolam (VERSED) injection 1 mg  Every 5 Minutes PRN,   Status:  Discontinued      01/07/18 1438    01/07/18 1438  midazolam (VERSED) injection 2 mg  Every 5 Minutes PRN,   Status:  Discontinued      01/07/18 1438    01/07/18 1438  Vital Signs - Per Anesthesia Protocol  As Needed,   Status:  Canceled      01/07/18 1438    01/07/18 1438  POC Glucose PRN  As Needed,   Status:  Canceled     Comments:  For all diabetic patients. Notify Anesthesiologist for blood sugar > 180.    01/07/18 1438    01/07/18 1438  sodium chloride 0.9 % flush 1-10 mL  As Needed,   Status:  Discontinued      01/07/18 1438    01/07/18 1131  Insert Peripheral IV  Once      01/07/18 1130    01/07/18 1131  Maintain IV Access  Continuous,   Status:  Canceled      01/07/18 1130    01/07/18 1130  lidocaine (XYLOCAINE) 1 % injection 0.5 mL  Once As Needed,   Status:  Discontinued      01/07/18 1130    01/07/18 1130  sodium chloride 0.9 % flush 3 mL  As Needed,   Status:  Discontinued      01/07/18 1130    01/07/18 1130  lactated ringers infusion 1,000 mL  Continuous PRN,   Status:  Discontinued      01/07/18 1130    01/07/18 0827  Implement Anesthesia orders day of procedure.  Once      01/07/18 0826 01/07/18 0827  Obtain informed consent  Once      01/07/18 0826     01/07/18 0821  Case Request  Once      01/07/18 0820    01/07/18 0001  NPO Diet  Diet Effective Midnight,   Status:  Canceled      01/06/18 2335    01/07/18 0000  Hemoglobin & Hematocrit, Blood  Every 8 Hours      01/06/18 2133    01/07/18 0000  Troponin  Every 8 Hours      01/06/18 2134    01/06/18 2334  morphine injection 2 mg  Every 4 Hours PRN      01/06/18 2335    01/06/18 2333  HYDROcodone-acetaminophen (NORCO)  MG per tablet 1 tablet  Every 4 Hours PRN      01/06/18 2335    01/06/18 2149  POC Glucose Once  Once      01/06/18 2148    01/06/18 2142  Inpatient Consult to Cardiology  Once     Specialty:  Cardiology  Provider:  Nick Sahu MD    01/06/18 2141    01/06/18 2136  Inpatient Consult to Cardiology  Once,   Status:  Canceled     Specialty:  Cardiology  Provider:  Nick Sahu MD    01/06/18 2135    01/06/18 2135  Inpatient Consult to Gastroenterology  Once     Specialty:  Gastroenterology  Provider:  Francesca Pascual MD    01/06/18 2134    01/06/18 2027  Critical Care  Once     Comments:  This order was created via procedure documentation    01/06/18 2026 01/06/18 2027  Inpatient Admission  Once      01/06/18 2026 01/06/18 2021  Pulmonology (on-call MD unless specified)  Once     Specialty:  Pulmonary Disease  Provider:  (Not yet assigned)    01/06/18 2020 01/06/18 2015  promethazine (PHENERGAN) injection 6.25 mg  Once      01/06/18 2013 01/06/18 1939  ABO RH Specimen Verification  Once      01/06/18 1938 01/06/18 1931  Verify Informed Consent  Once      01/06/18 1930 01/06/18 1931  Prepare RBC, 2 Units  Blood - Once      01/06/18 1930    01/06/18 1929  sodium chloride 0.9 % bolus 1,000 mL  Once      01/06/18 1927    01/06/18 1924  Comprehensive Metabolic Panel  Once      01/06/18 1843    01/06/18 1924  Troponin  STAT      01/06/18 1843    01/06/18 1855  sodium chloride 0.9 % infusion  Continuous      01/06/18 1853    01/06/18 1855  pantoprazole (PROTONIX) injection 80 mg  Once       01/06/18 1853    01/06/18 1855  pantoprazole (PROTONIX) 40 mg/100 mL (0.4 mg/mL) in 0.9% NS IVPB  Continuous      01/06/18 1853    01/06/18 1844  NPO Diet  Diet Effective Now,   Status:  Canceled      01/06/18 1843    01/06/18 1844  Undress and Gown  Once      01/06/18 1843    01/06/18 1844  Cardiac Monitoring  Per Hospital Policy      01/06/18 1843    01/06/18 1844  Pulse Oximetry  Per Hospital Policy      01/06/18 1843    01/06/18 1844  Measure Blood Pressure  Per Hospital Policy      01/06/18 1843    01/06/18 1844  Vital Signs  Per Hospital Policy      01/06/18 1843    01/06/18 1844  Insert Peripheral IV  Once      01/06/18 1843    01/06/18 1844  Adrian Draw  Once      01/06/18 1843    01/06/18 1844  CBC & Differential  Once      01/06/18 1843    01/06/18 1844  Type & Screen  Once      01/06/18 1843    01/06/18 1844  XR Chest 1 View  1 Time Imaging      01/06/18 1843    01/06/18 1844  Light Blue Top  PROCEDURE ONCE      01/06/18 1844    01/06/18 1844  Green Top (Gel)  PROCEDURE ONCE      01/06/18 1844    01/06/18 1844  Lavender Top  PROCEDURE ONCE      01/06/18 1844    01/06/18 1844  Gold Top - SST  PROCEDURE ONCE      01/06/18 1844    01/06/18 1844  CBC Auto Differential  PROCEDURE ONCE      01/06/18 1844    01/06/18 1843  sodium chloride 0.9 % flush 10 mL  As Needed      01/06/18 1843    01/06/18 1843  sodium chloride 0.9 % bolus 1,000 mL  Once      01/06/18 1841    01/06/18 1831  ECG 12 Lead  Once      01/06/18 1830    Unscheduled  Oxygen Therapy- Nasal Cannula; 2 LPM; Titrate for SPO2: equal to or greater than, 92%  As Needed      01/06/18 1843    --  UPPER GI ENDOSCOPY      01/07/18 1000    --  UPPER GI ENDOSCOPY      01/07/18 1441             Physician Progress Notes (last 72 hours) (Notes from 1/5/2018  9:15 AM through 1/8/2018  9:15 AM)      Kb Gann MD at 1/7/2018  1:01 PM  Version 1 of 1           Daily Progress Note.   [facility] CORONARY CARE  1/7/2018    Patient:  Name:   "Ruddy Griffin  MRN:  [id number]  1975  42 y.o.  male         Interval History:  No cp  No lighthedness.  No soa.  No further melena.    Physical Exam:  /95  Pulse 96  Temp 98.8 °F (37.1 °C) (Oral)   Resp 16  Ht 175.3 cm (69\")  Wt 98.4 kg (216 lb 14.9 oz)  SpO2 96%  BMI 32.04 kg/m2  Body mass index is 32.04 kg/(m^2).    Intake/Output Summary (Last 24 hours) at 01/07/18 1301  Last data filed at 01/07/18 1215   Gross per 24 hour   Intake             2652 ml   Output             2675 ml   Net              -23 ml     GENERAL:  NAD, Aox3  HEENT:  EOMI, nonicteric sclera, no JVD  PULMONARY:    CTAB, no wheeze, no crackles, no rhonchi, symmetric air entry  CARDIAC:  RRR no MRG, S1 S2  ABD: SNTND BS+  EXT: no c/c/e, pulses symmetric 2+ bilaterally  NEURO:  CNs II-XII intact, no focal deficits  SKIN: no lesions, no rash  PSYCH: appropriate mood  LYMPH: no cervical LAD    Scheduled meds:       Data Review:   I reviewed the patient's medications, imaging and new clinical results.    Results from last 7 days  Lab Units 01/07/18  0832 01/07/18  0023 01/06/18  1845   WBC 10*3/mm3  --   --  9.34   HEMOGLOBIN g/dL 9.0* 9.7* 11.4*   PLATELETS 10*3/mm3  --   --  247     Results from last 7 days  Lab Units 01/06/18  1939   SODIUM mmol/L 141   POTASSIUM mmol/L 4.0   CHLORIDE mmol/L 106   CO2 mmol/L 20.1*   BUN mg/dL 45*   CREATININE mg/dL 1.13   GLUCOSE mg/dL 105*   CALCIUM mg/dL 8.3*   Estimated Creatinine Clearance: 99.5 mL/min (by C-G formula based on Cr of 1.13).    Imaging Results (most recent)     Procedure Component Value Units Date/Time    XR Chest 1 View [[id number]] Collected:  01/06/18 1929     Updated:  01/06/18 1929    Narrative:       PORTABLE CHEST     HISTORY: Chest pain.     FINDINGS:  A single view of the chest demonstrates the heart to be  within normal limits in size. There is no evidence of focal infiltrate,  effusion or of congestive failure.             ASSESSMENT  /  PLAN:  UGIB  Hypotension " resolved  ABLA - - gib  STEMI s/p stent recently - plavix on hold until cleared by GI/GIB resolved/addressed  CP resolved    -serial hh  Monitor in unti  egd today  Gi and cards following.    Kb Gann MD  [facility]  01/07/18           Electronically signed by Kb Gann MD at 1/7/2018  1:03 PM      Nick Sahu MD at 1/8/2018  8:15 AM  Version 1 of 1            LOS: 2 days   Patient Care Team:  PAULA Puckett as PCP - General (Family Medicine)    Chief Complaint: Recent STEMI, GI bleed       Interval History: He had a STEMI with VF arrest and cardiogenic shock last month.  Now with acute GI bleeding, presumbly from ulcer (treated), alcohol use.      Objective   Vital Signs  Temp:  [97.5 °F (36.4 °C)-98.8 °F (37.1 °C)] 97.7 °F (36.5 °C)  Heart Rate:  [] 89  Resp:  [14-20] 18  BP: (108-157)/() 133/87    Intake/Output Summary (Last 24 hours) at 01/08/18 0816  Last data filed at 01/08/18 0749   Gross per 24 hour   Intake             7197 ml   Output             7050 ml   Net              147 ml       Comfortable NAD  PERRL, conjunctiva clear  Neck supple, no JVD or thyromegaly appreciated  S1/S2 RRR, no m/r/g  Lungs CTA B, normal effort  Abdomen S/NT/ND (+) BS, no HSM appreciated  Extremities warm, no clubbing, cyanosis, or edema  Normal gait  No visible or palpable skin lesions  A/Ox4, mood and affect appropriate    Results Review:        Results from last 7 days  Lab Units 01/06/18  1939   SODIUM mmol/L 141   POTASSIUM mmol/L 4.0   CHLORIDE mmol/L 106   CO2 mmol/L 20.1*   BUN mg/dL 45*   CREATININE mg/dL 1.13   GLUCOSE mg/dL 105*   CALCIUM mg/dL 8.3*       Results from last 7 days  Lab Units 01/07/18  1751 01/07/18  0832 01/07/18  0023   TROPONIN T ng/mL <0.010 <0.010 <0.010       Results from last 7 days  Lab Units 01/08/18  0009 01/07/18  1751 01/07/18  0832  01/06/18  1845   WBC 10*3/mm3  --   --   --   --  9.34   HEMOGLOBIN g/dL 8.7* 8.9* 9.0*  < > 11.4*   HEMATOCRIT %  27.1* 27.1* 27.7*  < > 35.5*   PLATELETS 10*3/mm3  --   --   --   --  247   < > = values in this interval not displayed.                    I reviewed the patient's new clinical results.  I personally viewed and interpreted the patient's EKG/Telemetry data        Medication Review:     sucralfate 1 g Oral Q6H         pantoprazole 8 mg/hr Last Rate: 8 mg/hr (01/08/18 0724)   sodium chloride 125 mL/hr Last Rate: 125 mL/hr (01/08/18 0109)       Assessment/Plan     Active Problems:    Upper GI bleed    Recent STEMI complicated by VF arrest and shock.  I would resume ASA/clopidogrel as soon as possible to reduce risk of stent thrombosis.    We can always support Hb with blood products if necessary.    Nick Sahu MD  01/08/18  8:16 AM         Electronically signed by Nick Sahu MD at 1/8/2018  8:17 AM      Camilla Reeves MD at 1/8/2018  8:42 AM  Version 1 of 1         [facility]  Inpatient Progress Note    Reason for Follow Up:  Upper gi bleed    Subjective     Interval History:   Ulcer with visible vessel in the fundus, injected and clipped.  He denies pain, stools, hematemesis.  Very much wants to eat scrambled eggs.  Has tolerated clears without issue.  Awaiting stool for H pylori Ag.  On pantoprazole gtt    Current Facility-Administered Medications:   •  HYDROcodone-acetaminophen (NORCO)  MG per tablet 1 tablet, 1 tablet, Oral, Q4H PRN, Raul Gann MD, 1 tablet at 01/08/18 0430  •  morphine injection 2 mg, 2 mg, Intravenous, Q4H PRN, Raul Gann MD  •  [COMPLETED] pantoprazole (PROTONIX) injection 80 mg, 80 mg, Intravenous, Once, 80 mg at 01/06/18 1901 **AND** pantoprazole (PROTONIX) 40 mg/100 mL (0.4 mg/mL) in 0.9% NS IVPB, 8 mg/hr, Intravenous, Continuous, Neil Osorio MD, Last Rate: 20 mL/hr at 01/08/18 0724, 8 mg/hr at 01/08/18 0724  •  sodium chloride 0.9 % flush 10 mL, 10 mL, Intravenous, PRN, Neil Osorio MD  •  sodium chloride 0.9 % infusion, 125 mL/hr, Intravenous,  Continuous, Raul Gann MD, Last Rate: 125 mL/hr at 01/08/18 0109, 125 mL/hr at 01/08/18 0109  •  sucralfate (CARAFATE) 1 GM/10ML suspension 1 g, 1 g, Oral, Q6H, Francesca Pascual MD, 1 g at 01/08/18 0634  Review of Systems:    All systems were reviewed and negative except for:  Constitution:  positive for hunger    Objective     Vital Signs  Temp:  [97.5 °F (36.4 °C)-98.8 °F (37.1 °C)] 97.7 °F (36.5 °C)  Heart Rate:  [] 89  Resp:  [14-20] 18  BP: (108-157)/() 133/87  Body mass index is 32.04 kg/(m^2).    Intake/Output Summary (Last 24 hours) at 01/08/18 0842  Last data filed at 01/08/18 0749   Gross per 24 hour   Intake             7197 ml   Output             7050 ml   Net              147 ml     I/O this shift:  In: -   Out: 450 [Urine:450]     Physical Exam:   General: patient awake, alert and cooperative   Eyes: Normal lids and lashes, no scleral icterus   Neck: supple, normal ROM   Skin: warm and dry, not jaundiced   Cardiovascular: regular rhythm and rate, no murmurs auscultated   Pulm: clear to auscultation bilaterally, regular and unlabored   Abdomen: soft, nontender, nondistended; normal bowel sounds   Rectal: deferred   Extremities: no rash or edema   Psychiatric: Normal mood and behavior; memory intact     Results Review:     I reviewed the patient's new clinical results.      Results from last 7 days  Lab Units 01/08/18  0009 01/07/18  1751 01/07/18  0832  01/06/18  1845   WBC 10*3/mm3  --   --   --   --  9.34   HEMOGLOBIN g/dL 8.7* 8.9* 9.0*  < > 11.4*   HEMATOCRIT % 27.1* 27.1* 27.7*  < > 35.5*   PLATELETS 10*3/mm3  --   --   --   --  247   < > = values in this interval not displayed.    Results from last 7 days  Lab Units 01/06/18  1939   SODIUM mmol/L 141   POTASSIUM mmol/L 4.0   CHLORIDE mmol/L 106   CO2 mmol/L 20.1*   BUN mg/dL 45*   CREATININE mg/dL 1.13   CALCIUM mg/dL 8.3*   BILIRUBIN mg/dL 0.4   ALK PHOS U/L 53   ALT (SGPT) U/L 52*   AST (SGOT) U/L 18   GLUCOSE mg/dL 105*            Lab Results  Lab Value Date/Time   LIPASE 33 12/03/2014 1645       Radiology:  XR Chest 1 View   Final Result          Assessment/Plan     Patient Active Problem List   Diagnosis   • ST elevation myocardial infarction (STEMI)   • Coronary artery disease involving native coronary artery of native heart without angina pectoris   • Essential hypertension   • Hyperlipidemia   • Upper GI bleed       Impression  1. Upper GI bleed: ulcer with visible vessel in the fundus, injected and clipped.  Hb stable, on ppi gtt  2. Blood loss anemia: Hb stable to yesterday  3. STEMI with LAUREN: plavix currently held, stent placed 12/17/17    Plan  -continue ppi gtt x 72h then BID x 3 months  -repeat EGD in 3 months to assess healing of ulcer  -follow up stool for H pylori  -ok for full liquids, some scrambled eggs  -plavix as per cardiology with close monitoring, ? tomorrow  -NSAID avoidance    I discussed the patients findings and my recommendations with patient and nursing staff.    Camilla Reeves MD     Electronically signed by Camilla Reeves MD at 1/8/2018  8:51 AM           Consult Notes (last 72 hours) (Notes from 1/5/2018  9:15 AM through 1/8/2018  9:15 AM)      Francesca Pascual MD at 1/7/2018  7:56 AM  Version 1 of 1     Consult Orders:    1. Inpatient Consult to Gastroenterology [[id number]] ordered by Raul Gann MD at 01/06/18 0304                [facility]  Initial Inpatient Consult Note    Referring Provider: Dr Raul Gann    Reason for Consultation: UGIB    Subjective     History of present illness:    42 y.o. male with history of CAD with stent placed 12/17/17 on plavix admitted with dark stools that began yesterday.  He was lightlheaded and had chest pain and soa - initial BP 75/46 with tachycardia.  Stool heme positive in ER.  Initial hemoglobin 11.4 - dropped to 9.7 overnight.    He denies any NSAIDs except for his baby aspirin.  He awoke yesterday morning feeling ill.  He did not  eat anything.  His stomach was upset.  He had a dark bowel movement and went back to bed later that day.  When he awoke again he was short of breath and dizzy and had a syncopal episode at home.  His blood pressure improved with fluids.  He has not been transfused.  He is able to stand up without feeling dizzy at this point.  He has no chest pain or trouble breathing.  Denies abdominal pain or nausea.    Past Medical History:  Past Medical History:   Diagnosis Date   • Coronary artery disease    • Hyperlipidemia    • Hypertension    • ST elevation myocardial infarction (STEMI) 12/17/2017     Past Surgical History:  Past Surgical History:   Procedure Laterality Date   • APPENDECTOMY     • CARDIAC CATHETERIZATION N/A 12/17/2017    Procedure: Left Heart Cath;  Surgeon: Nick Sahu MD;  Location: [facility] CATH INVASIVE LOCATION;  Service:    • CARDIAC CATHETERIZATION N/A 12/17/2017    Procedure: Stent LAUREN coronary;  Surgeon: Nick Sahu MD;  Location: [facility] CATH INVASIVE LOCATION;  Service:    • CARDIAC CATHETERIZATION  12/17/2017    Procedure: Percutaneous Manual Thrombectomy;  Surgeon: Nick Sahu MD;  Location: [facility] CATH INVASIVE LOCATION;  Service:    • CORONARY STENT PLACEMENT        Social History:   Social History   Substance Use Topics   • Smoking status: Former Smoker     Packs/day: 1.00     Years: 15.00     Quit date: 12/17/2017   • Smokeless tobacco: Never Used   • Alcohol use 5.4 - 7.2 oz/week     9 - 12 Cans of beer per week      Comment: reports 9-12 beers every three days or so       Family History:  Family History   Problem Relation Age of Onset   • Heart disease Father    • Heart attack Father    • Heart attack Sister    • Heart disease Sister        Home Meds:  Prescriptions Prior to Admission   Medication Sig Dispense Refill Last Dose   • aspirin 81 MG chewable tablet Chew 1 tablet Daily. 30 tablet 5 Taking   • atorvastatin (LIPITOR) 20 MG tablet Take 1 tablet by mouth Daily. 30 tablet 11 Taking   •  carvedilol (COREG) 6.25 MG tablet Take 1 tablet by mouth 2 (two) times a day with meals. 60 tablet 0 Taking   • clopidogrel (PLAVIX) 75 MG tablet Take 1 tablet by mouth Daily. 30 tablet 5 Taking   • cyclobenzaprine (FLEXERIL) 10 MG tablet Take 1 tablet by mouth 3 (three) times a day as needed for muscle spasms. 21 tablet 0 Taking   • HYDROcodone-acetaminophen (NORCO) 7.5-325 MG per tablet Take 1 tablet by mouth every 6 (six) hours as needed for moderate pain (4-6). 14 tablet 0 Taking     Current Meds:      Allergies:  No Known Allergies  Review of Systems  Pertinent items are noted in HPI, all other systems reviewed and negative     Objective     Vital Signs  Temp:  [98 °F (36.7 °C)-98.5 °F (36.9 °C)] 98 °F (36.7 °C)  Heart Rate:  [] 98  Resp:  [16-18] 16  BP: ()/(36-96) 109/84  Physical Exam:  General Appearance:    Alert, cooperative, in no acute distress   Head:    Normocephalic, without obvious abnormality, atraumatic   Eyes:            Lids and lashes normal, conjunctivae and sclerae normal, no   icterus   Throat:   No oral lesions, no thrush, oral mucosa dry       Lungs:     Clear to auscultation,respirations regular, even and                   unlabored    Heart:    Regular rhythm and normal rate, normal S1 and S2, no            Murmur    Chest Wall:    No abnormalities observed   Abdomen:     Normal bowel sounds, no masses, no organomegaly, soft        non-tender, non-distended, no guarding, no rebound                 tenderness   Rectal:     Deferred   Extremities:   no edema    Skin:   No bleeding, bruising or rash       Psychiatric:  Judgement and insight: normal   Orientation to person place and time: normal   Mood and affect: normal   Results Review:   I reviewed the patient's new clinical results.      Results from last 7 days  Lab Units 01/07/18  0023 01/06/18  1845   WBC 10*3/mm3  --  9.34   HEMOGLOBIN g/dL 9.7* 11.4*   HEMATOCRIT % 30.7* 35.5*   PLATELETS 10*3/mm3  --  247       Results  from last 7 days  Lab Units 18  1939   SODIUM mmol/L 141   POTASSIUM mmol/L 4.0   CHLORIDE mmol/L 106   CO2 mmol/L 20.1*   BUN mg/dL 45*   CREATININE mg/dL 1.13   CALCIUM mg/dL 8.3*   BILIRUBIN mg/dL 0.4   ALK PHOS U/L 53   ALT (SGPT) U/L 52*   AST (SGOT) U/L 18   GLUCOSE mg/dL 105*           Lab Results  Lab Value Date/Time   LIPASE 33 2014 1645       Radiology:  XR Chest 1 View             Assessment/Plan   Patient Active Problem List   Diagnosis   • ST elevation myocardial infarction (STEMI)   • Coronary artery disease involving native coronary artery of native heart without angina pectoris   • Essential hypertension   • Hyperlipidemia   • Upper GI bleed     Impression-  1.  Upper GI bleed  2.  Acute blood loss anemia  3.  ST elevation MI with drug-eluting stent placed 17    Plan-  Continue Protonix drip  He is now hemodynamically stable-his blood pressure improved with fluids  We'll plan for a bedside EGD today for further evaluation  Continue to hold Plavix-last dose yesterday    I discussed the patients findings and my recommendations with patient and nursing staff.    Francesca Pascual MD               Electronically signed by Francesca Pascual MD at 2018  8:19 AM      Cain Car MD at 2018  8:27 AM  Version 1 of 1     Consult Orders:    1. Inpatient Consult to Cardiology [[id number]] ordered by Raul Gann MD at 18 2141                         Patient Name: Ruddy Griffin  Age/Sex: 42 y.o. male  : 1975  MRN: [id number]    Date of Admission: 2018  Date of Encounter Visit: 18  Encounter Provider: Cain Car MD  Place of Service: [facility] CARDIOLOGY      Referring Provider: Raul Gann MD  Patient Care Team:  PAULA Puckett as PCP - General (Family Medicine)    Subjective:     Consulted for:  Evaluation of Plavix after recent STEMI    Chief Complaint: GI bleed     History of Present  Illness:  Ruddy Griffin is a 42 y.o. male , former smoker- with hx of HTN- who was recently hospitalized in [facility] for nausea and GI discomfort- EKG found him to have ST elevation in the inferior leads, and he had VF that was treated with one shock. He underwent cardiac catheterization where he had a high grade proximal RCA lesion and acute occlusion of his distal RCA that was treated with thrombectomy and LAUREN. During the intervention he did have cardiogenic shock with bradycardia, but this stabilized after revascularization. He was discharged on 12/18/17. His echocardiogram on 12/18/17 found an EF of 58%, mild mitral valve regurgitation, trace tricuspid valve regurgitation, borderline dilation of the ascending aorta measuring 3.8 cm, and hypokinesis of the basal and mid inferior wall segments.  He was started on aspirin, atorvastatin, carvedilol, and clopidogrel. He followed up with Naila Bran in the office on 12/26/17- where he had stated that had quit smoking and was without chest pain. He had plans to start cardiac rehab soon.     He came into the ER yesterday for dark loose stools with some lightheadedness, SOA, and chest pressure. He had abdominal pain the night before but did drink 9 beers. Per his report he did lose consciousness at home.  He denied chest pain at that time but did complain of a short duration of chest discomfort in the ER prior to IV fluid administration.  No chest discomfort this morning.  States that he feels better.Hgb went from 11.4 to 9.7. GI was consulted, he was started on a protonix drip, and there are plans for EGD today.       Previous testing:   Echocardiogram on 12/18/17-  Interpretation Summary      · Left ventricular systolic function is normal. Calculated EF = 58%. Estimated EF was in agreement with the calculated EF. Estimated EF = 58%. Normal left ventricular cavity size and wall thickness noted. Left ventricular diastolic function is normal.  · Mild mitral valve  regurgitation is present.  · Trace tricuspid valve regurgitation is present. Estimated right ventricular systolic pressure from tricuspid regurgitation is normal (<35 mmHg).  · Borderline dilation of the ascending aorta is present. Mid ascending aorta measured 3.8 cm.  · The following segments are hypokinetic: basal inferior and mid inferior. All other segments are normal.     Cardiac Catheterization on 12/17/17-  FINDINGS:     1. HEMODYNAMICS:  LV 80/12, AO 80/52/60.     2. LEFT VENTRICULOGRAPHY: EF 60%, no mitral regurgitation.  Mild inferior hypokinesis.     3. CORONARY ANGIOGRAPHY: Right dominant system, one-vessel coronary disease.  The left main is normal.  The proximal LAD has 50% stenosis.  The mid to distal LAD has diffuse 50% stenosis.  The ramus is a very large branch and is normal.  The circumflex is moderate in size and has 20% distal stenosis.  The RCA is large and dominant.  The proximal vessel has up to 90% stenosis.  This is a long lesion.  The mid vessel is normal.  The distal vessel is totally occluded.       4. INTERVENTIONAL COMMENTS:  Successful thrombectomy/LAUREN of the proximal and distal RCA was performed as described above.     SUMMARY: Acute inferior STEMI complicated by VF, cardiogenic shock, and bradycardia, successfully treated with thrombectomy/LAUREN of the RCA.     RECOMMENDATIONS: Routine post MI care.      Past Medical History:  Past Medical History:   Diagnosis Date   • Coronary artery disease    • Hyperlipidemia    • Hypertension    • ST elevation myocardial infarction (STEMI) 12/17/2017       Past Surgical History:   Procedure Laterality Date   • APPENDECTOMY     • CARDIAC CATHETERIZATION N/A 12/17/2017    Procedure: Left Heart Cath;  Surgeon: Nick Sahu MD;  Location: [facility] CATH INVASIVE LOCATION;  Service:    • CARDIAC CATHETERIZATION N/A 12/17/2017    Procedure: Stent LAUREN coronary;  Surgeon: Nick Sahu MD;  Location: [facility] CATH INVASIVE LOCATION;  Service:    • CARDIAC  CATHETERIZATION  12/17/2017    Procedure: Percutaneous Manual Thrombectomy;  Surgeon: Nick Sahu MD;  Location: [facility] INVASIVE LOCATION;  Service:    • CORONARY STENT PLACEMENT         Home Medications:   Prescriptions Prior to Admission   Medication Sig Dispense Refill Last Dose   • aspirin 81 MG chewable tablet Chew 1 tablet Daily. 30 tablet 5 Taking   • atorvastatin (LIPITOR) 20 MG tablet Take 1 tablet by mouth Daily. 30 tablet 11 Taking   • carvedilol (COREG) 6.25 MG tablet Take 1 tablet by mouth 2 (two) times a day with meals. 60 tablet 0 Taking   • clopidogrel (PLAVIX) 75 MG tablet Take 1 tablet by mouth Daily. 30 tablet 5 Taking   • cyclobenzaprine (FLEXERIL) 10 MG tablet Take 1 tablet by mouth 3 (three) times a day as needed for muscle spasms. 21 tablet 0 Taking   • HYDROcodone-acetaminophen (NORCO) 7.5-325 MG per tablet Take 1 tablet by mouth every 6 (six) hours as needed for moderate pain (4-6). 14 tablet 0 Taking       Allergies:  No Known Allergies    Past Social History:  Social History     Social History   • Marital status: Single     Spouse name: N/A   • Number of children: N/A   • Years of education: N/A     Occupational History   • Not on file.     Social History Main Topics   • Smoking status: Former Smoker     Packs/day: 1.00     Years: 15.00     Quit date: 12/17/2017   • Smokeless tobacco: Never Used   • Alcohol use 5.4 - 7.2 oz/week     9 - 12 Cans of beer per week      Comment: reports 9-12 beers every three days or so    • Drug use: Yes     Special: Marijuana      Comment: occ   • Sexual activity: Defer     Other Topics Concern   • Not on file     Social History Narrative   • No narrative on file        Past Family History:  Family History   Problem Relation Age of Onset   • Heart disease Father    • Heart attack Father    • Heart attack Sister    • Heart disease Sister          Review of Systems:  All systems reviewed. Pertinent positives identified in HPI. All other systems are  negative.         Objective:     Objective:  Temp:  [98 °F (36.7 °C)-98.5 °F (36.9 °C)] 98 °F (36.7 °C)  Heart Rate:  [] 98  Resp:  [16-18] 16  BP: ()/(36-96) 109/84    Intake/Output Summary (Last 24 hours) at 01/07/18 0849  Last data filed at 01/07/18 0617   Gross per 24 hour   Intake             2052 ml   Output             2175 ml   Net             -123 ml     Body mass index is 32.04 kg/(m^2).  Last 3 weights    01/06/18  1847 01/06/18  2130   Weight: 95.3 kg (210 lb) 98.4 kg (216 lb 14.9 oz)           Physical Exam:   General Appearance Well developed, cooperative and well nourished and no acute distress   Head Normocephalic, without abnormality, atraumatic   Ears Ears appear intact with no abnormalities noted   Throat No oral lesions, no thrush, oral mucosa moist   Neck No adenopathy, supple, trachea midline, no thyromegaly, no carotid bruit, no JVD   Back No skin lesions, erythema or scars, no tenderness to percussion or palptaion,range of motion is normal   Lungs Clear to auscultation,respirations regular, even and unlabored   Heart Regular rhythm and normal rate, normal S1 and S2, no murmur, no gallop, no rub, no click   Chest wall No abnormalities observed   Abdomen Normal bowel sounds, no masses, no hepatomegaly,    Extremities Moves all extremities well, no edema, no cyanosis, no redness   Pulses Pulses palpable and equal bilaterally. Normal radial, carotid, femoral, dorsalis pedis and posterior tibial pulses bilaterally. Normal abdominal aorta   Skin No bleeding, bruising or rash   Psyhiatric Alert and oriented x 3, normal mood and affect       Lab Review:       Results from last 7 days  Lab Units 01/06/18  1939   SODIUM mmol/L 141   POTASSIUM mmol/L 4.0   CHLORIDE mmol/L 106   CO2 mmol/L 20.1*   BUN mg/dL 45*   CREATININE mg/dL 1.13   GLUCOSE mg/dL 105*   CALCIUM mg/dL 8.3*         Results from last 7 days  Lab Units 01/07/18  0023 01/06/18  1939   TROPONIN T ng/mL <0.010 <0.010        Results from last 7 days  Lab Units 01/07/18  0023 01/06/18  1845   WBC 10*3/mm3  --  9.34   HEMOGLOBIN g/dL 9.7* 11.4*   HEMATOCRIT % 30.7* 35.5*   PLATELETS 10*3/mm3  --  247                                   Imaging:    Imaging Results (most recent)     Procedure Component Value Units Date/Time    XR Chest 1 View [[id number]] Collected:  01/06/18 1929     Updated:  01/06/18 1929    Narrative:       PORTABLE CHEST     HISTORY: Chest pain.     FINDINGS:  A single view of the chest demonstrates the heart to be  within normal limits in size. There is no evidence of focal infiltrate,  effusion or of congestive failure.             Results for orders placed during the hospital encounter of 12/17/17   Echocardiogram 2D complete    Addendum · Left ventricular systolic function is normal. Calculated EF = 58%.  Estimated EF was in agreement with the calculated EF. Estimated EF = 58%.  Normal left ventricular cavity size and wall thickness noted. Left  ventricular diastolic function is normal. · Mild mitral valve regurgitation is present. · Trace tricuspid valve regurgitation is present. Estimated right  ventricular systolic pressure from tricuspid regurgitation is normal (<35  mmHg). · Borderline dilation of the ascending aorta is present. Mid ascending  aorta measured 3.8 cm. · The following segments are hypokinetic: basal inferior and mid inferior.  All other segments are normal.        Micaela Burris MD 12/18/2017 10:50 AM          Narrative · Left ventricular systolic function is normal. Calculated EF = 58%.   Estimated EF was in agreement with the calculated EF. Estimated EF = 58%.   Normal left ventricular cavity size and wall thickness noted. Left   ventricular diastolic function is normal.  · Mild mitral valve regurgitation is present.  · Trace tricuspid valve regurgitation is present. Estimated right   ventricular systolic pressure from tricuspid regurgitation is normal (<35   mmHg).  · Borderline dilation of  the ascending aorta is present. Mid ascending   aorta measured 3.8 cm.          EK18          BASELINE: 17        I personally viewed and interpreted the patient's EKG/Telemetry data.    Assessment:   Assessment/Plan   Active Problems:    Upper GI bleed  Acute blood loss anemia  CAD with recent inferior STEMI and PCI to the RCA  Hemorrhagic shock: Resolved  Hyperlipidemia    Plan:   -Likely PUD with excessive alcohol usage and dual antiplatelet therapy leading to bleed  -Unfortunately he did have a STEMI last month and drug-eluting stents to the RCA.  Once we get past about 2-3 days off of dual antiplatelet therapy hour risk of stent thrombosis is going to increase significantly.  -I will await the EGD today, however I would prefer the patient to be back on aspirin and Plavix tomorrow if adequate crit is stable and no additional bleeding.    -I will await GI evaluation    Thank you for allowing me to participate in the care of Ruddy Griffin. Feel free to contact me directly with any further questions or concerns.    Cain Car MD  [facility]  18  8:49 AM     Electronically signed by Cain Car MD at 2018  9:02 AM

## 2018-01-08 NOTE — PROGRESS NOTES
LOS: 2 days   Patient Care Team:  PAULA Puckett as PCP - General (Family Medicine)    Chief Complaint: Recent STEMI, GI bleed       Interval History: He had a STEMI with VF arrest and cardiogenic shock last month.  Now with acute GI bleeding, presumbly from ulcer (treated), alcohol use.      Objective   Vital Signs  Temp:  [97.5 °F (36.4 °C)-98.8 °F (37.1 °C)] 97.7 °F (36.5 °C)  Heart Rate:  [] 89  Resp:  [14-20] 18  BP: (108-157)/() 133/87    Intake/Output Summary (Last 24 hours) at 01/08/18 0816  Last data filed at 01/08/18 0749   Gross per 24 hour   Intake             7197 ml   Output             7050 ml   Net              147 ml       Comfortable NAD  PERRL, conjunctiva clear  Neck supple, no JVD or thyromegaly appreciated  S1/S2 RRR, no m/r/g  Lungs CTA B, normal effort  Abdomen S/NT/ND (+) BS, no HSM appreciated  Extremities warm, no clubbing, cyanosis, or edema  Normal gait  No visible or palpable skin lesions  A/Ox4, mood and affect appropriate    Results Review:        Results from last 7 days  Lab Units 01/06/18  1939   SODIUM mmol/L 141   POTASSIUM mmol/L 4.0   CHLORIDE mmol/L 106   CO2 mmol/L 20.1*   BUN mg/dL 45*   CREATININE mg/dL 1.13   GLUCOSE mg/dL 105*   CALCIUM mg/dL 8.3*       Results from last 7 days  Lab Units 01/07/18  1751 01/07/18  0832 01/07/18  0023   TROPONIN T ng/mL <0.010 <0.010 <0.010       Results from last 7 days  Lab Units 01/08/18  0009 01/07/18  1751 01/07/18  0832  01/06/18  1845   WBC 10*3/mm3  --   --   --   --  9.34   HEMOGLOBIN g/dL 8.7* 8.9* 9.0*  < > 11.4*   HEMATOCRIT % 27.1* 27.1* 27.7*  < > 35.5*   PLATELETS 10*3/mm3  --   --   --   --  247   < > = values in this interval not displayed.                    I reviewed the patient's new clinical results.  I personally viewed and interpreted the patient's EKG/Telemetry data        Medication Review:     sucralfate 1 g Oral Q6H         pantoprazole 8 mg/hr Last Rate: 8 mg/hr (01/08/18 0724)   sodium  chloride 125 mL/hr Last Rate: 125 mL/hr (01/08/18 0109)       Assessment/Plan     Active Problems:    Upper GI bleed    Recent STEMI complicated by VF arrest and shock.  I would resume ASA/clopidogrel as soon as possible to reduce risk of stent thrombosis.    We can always support Hb with blood products if necessary.    Nick Sahu MD  01/08/18  8:16 AM

## 2018-01-08 NOTE — PLAN OF CARE
Problem: Fall Risk (Adult)  Goal: Identify Related Risk Factors and Signs and Symptoms  Outcome: Outcome(s) achieved Date Met: 01/08/18    Goal: Absence of Falls  Outcome: Ongoing (interventions implemented as appropriate)      Problem: Skin Integrity Impairment, Risk/Actual (Adult)  Goal: Skin Integrity/Wound Healing  Outcome: Ongoing (interventions implemented as appropriate)      Problem: Gastrointestinal Bleeding (Adult)  Goal: Signs and Symptoms of Listed Potential Problems Will be Absent or Manageable (Gastrointestinal Bleeding)  Outcome: Ongoing (interventions implemented as appropriate)      Problem: Patient Care Overview (Adult)  Goal: Plan of Care Review  Outcome: Ongoing (interventions implemented as appropriate)   01/08/18 0650   Coping/Psychosocial Response Interventions   Plan Of Care Reviewed With patient   Patient Care Overview   Progress progress toward functional goals as expected   Outcome Evaluation   Outcome Summary/Follow up Plan complaints of pain and wanting solid food, frequent urination, VSS, will cdontinue to monitor       Problem: Pain, Acute (Adult)  Goal: Identify Related Risk Factors and Signs and Symptoms  Outcome: Outcome(s) achieved Date Met: 01/08/18    Goal: Acceptable Pain Control/Comfort Level  Outcome: Ongoing (interventions implemented as appropriate)

## 2018-01-08 NOTE — PROGRESS NOTES
[facility]  Inpatient Progress Note    Reason for Follow Up:  Upper gi bleed    Subjective     Interval History:   Ulcer with visible vessel in the fundus, injected and clipped.  He denies pain, stools, hematemesis.  Very much wants to eat scrambled eggs.  Has tolerated clears without issue.  Awaiting stool for H pylori Ag.  On pantoprazole gtt    Current Facility-Administered Medications:   •  HYDROcodone-acetaminophen (NORCO)  MG per tablet 1 tablet, 1 tablet, Oral, Q4H PRN, Raul Gann MD, 1 tablet at 01/08/18 0430  •  morphine injection 2 mg, 2 mg, Intravenous, Q4H PRN, Raul Gann MD  •  [COMPLETED] pantoprazole (PROTONIX) injection 80 mg, 80 mg, Intravenous, Once, 80 mg at 01/06/18 1901 **AND** pantoprazole (PROTONIX) 40 mg/100 mL (0.4 mg/mL) in 0.9% NS IVPB, 8 mg/hr, Intravenous, Continuous, Neil Osorio MD, Last Rate: 20 mL/hr at 01/08/18 0724, 8 mg/hr at 01/08/18 0724  •  sodium chloride 0.9 % flush 10 mL, 10 mL, Intravenous, PRN, Neil Osorio MD  •  sodium chloride 0.9 % infusion, 125 mL/hr, Intravenous, Continuous, Raul Gann MD, Last Rate: 125 mL/hr at 01/08/18 0109, 125 mL/hr at 01/08/18 0109  •  sucralfate (CARAFATE) 1 GM/10ML suspension 1 g, 1 g, Oral, Q6H, Francesca Pascual MD, 1 g at 01/08/18 0634  Review of Systems:    All systems were reviewed and negative except for:  Constitution:  positive for hunger    Objective     Vital Signs  Temp:  [97.5 °F (36.4 °C)-98.8 °F (37.1 °C)] 97.7 °F (36.5 °C)  Heart Rate:  [] 89  Resp:  [14-20] 18  BP: (108-157)/() 133/87  Body mass index is 32.04 kg/(m^2).    Intake/Output Summary (Last 24 hours) at 01/08/18 0842  Last data filed at 01/08/18 0749   Gross per 24 hour   Intake             7197 ml   Output             7050 ml   Net              147 ml     I/O this shift:  In: -   Out: 450 [Urine:450]     Physical Exam:   General: patient awake, alert and cooperative   Eyes: Normal lids and lashes, no  scleral icterus   Neck: supple, normal ROM   Skin: warm and dry, not jaundiced   Cardiovascular: regular rhythm and rate, no murmurs auscultated   Pulm: clear to auscultation bilaterally, regular and unlabored   Abdomen: soft, nontender, nondistended; normal bowel sounds   Rectal: deferred   Extremities: no rash or edema   Psychiatric: Normal mood and behavior; memory intact     Results Review:     I reviewed the patient's new clinical results.      Results from last 7 days  Lab Units 01/08/18  0009 01/07/18  1751 01/07/18  0832  01/06/18  1845   WBC 10*3/mm3  --   --   --   --  9.34   HEMOGLOBIN g/dL 8.7* 8.9* 9.0*  < > 11.4*   HEMATOCRIT % 27.1* 27.1* 27.7*  < > 35.5*   PLATELETS 10*3/mm3  --   --   --   --  247   < > = values in this interval not displayed.    Results from last 7 days  Lab Units 01/06/18  1939   SODIUM mmol/L 141   POTASSIUM mmol/L 4.0   CHLORIDE mmol/L 106   CO2 mmol/L 20.1*   BUN mg/dL 45*   CREATININE mg/dL 1.13   CALCIUM mg/dL 8.3*   BILIRUBIN mg/dL 0.4   ALK PHOS U/L 53   ALT (SGPT) U/L 52*   AST (SGOT) U/L 18   GLUCOSE mg/dL 105*           Lab Results  Lab Value Date/Time   LIPASE 33 12/03/2014 1645       Radiology:  XR Chest 1 View   Final Result          Assessment/Plan     Patient Active Problem List   Diagnosis   • ST elevation myocardial infarction (STEMI)   • Coronary artery disease involving native coronary artery of native heart without angina pectoris   • Essential hypertension   • Hyperlipidemia   • Upper GI bleed       Impression  1. Upper GI bleed: ulcer with visible vessel in the fundus, injected and clipped.  Hb stable, on ppi gtt  2. Blood loss anemia: Hb stable to yesterday  3. STEMI with LAUREN: plavix currently held, stent placed 12/17/17    Plan  -continue ppi gtt x 72h then BID x 3 months  -repeat EGD in 3 months to assess healing of ulcer  -follow up stool for H pylori  -ok for full liquids, some scrambled eggs  -plavix as per cardiology with close monitoring, ?  tomorrow  -NSAID avoidance    I discussed the patients findings and my recommendations with patient and nursing staff.    Camilla Reeves MD

## 2018-01-08 NOTE — NURSING NOTE
Spoke with STANLEY Haque for Dr. Reeves confirmed MD okay to restart Plavix, and ASA if Cardiology MD is addiment that the patient has to have it. Also aware of Hgb drop and recheck this evening. Spoke with NICOLASA Castro for Dr. Sahu clarified Plavix and need for ASA and patients home coreg, see orders. Read back and verified.    Loco Marin RN

## 2018-01-08 NOTE — PROGRESS NOTES
Discharge Planning Assessment  [facility]     Patient Name: Ruddy Griffin  MRN: [id number]  Today's Date: 1/8/2018    Admit Date: 1/6/2018          Discharge Needs Assessment       01/08/18 1503    Discharge Needs Assessment    Equipment Currently Used at Home none    Equipment Needed After Discharge none      01/08/18 1455    Living Environment    Lives With parent(s)    Living Arrangements house    Home Accessibility no concerns    Living Environment    Provides Primary Care For parent(s)    Quality Of Family Relationships supportive    Discharge Needs Assessment    Concerns To Be Addressed no discharge needs identified;denies needs/concerns at this time            Discharge Plan       01/08/18 1506    Case Management/Social Work Plan    Plan Plans are home. CCP will follow for dc needs    Patient/Family In Agreement With Plan yes    Additional Comments Spoke with pt at bedside.  Confirmed face sheet and pharmacy info with pt.  Pt states he lives with Dad and step-mom, and is assisting with caregiving needs of his father.  Pt is REYNALDO's.  No hx of HH.  Pt was to start out pt cardiac rehab today, CCP called cardio rehab to update on pt adm. Pt states his plans are home.  Instructed that CCP would follow and offer assist as needed.          Discharge Placement     No information found                Demographic Summary     None            Functional Status       01/08/18 1454    Functional Status Prior    Ambulation 0-->independent    Transferring 0-->independent    Toileting 0-->independent    Bathing 0-->independent    Dressing 0-->independent    Eating 0-->independent    IADL    Medications independent    Meal Preparation independent    Housekeeping independent    Laundry independent    Shopping independent    Oral Care independent    Cognitive/Perceptual/Developmental    Current Mental Status/Cognitive Functioning no deficits noted            Psychosocial     None            Abuse/Neglect     None             Legal     None            Substance Abuse     None            Patient Forms     None          Marnie Helms, RN

## 2018-01-09 LAB
ALBUMIN SERPL-MCNC: 3.5 G/DL (ref 3.5–5.2)
ANION GAP SERPL CALCULATED.3IONS-SCNC: 10.9 MMOL/L
BASOPHILS # BLD AUTO: 0.01 10*3/MM3 (ref 0–0.2)
BASOPHILS NFR BLD AUTO: 0.2 % (ref 0–1.5)
BUN BLD-MCNC: 10 MG/DL (ref 6–20)
BUN/CREAT SERPL: 12.2 (ref 7–25)
CALCIUM SPEC-SCNC: 8.8 MG/DL (ref 8.6–10.5)
CHLORIDE SERPL-SCNC: 105 MMOL/L (ref 98–107)
CO2 SERPL-SCNC: 26.1 MMOL/L (ref 22–29)
CREAT BLD-MCNC: 0.82 MG/DL (ref 0.76–1.27)
DEPRECATED RDW RBC AUTO: 48.4 FL (ref 37–54)
DEPRECATED RDW RBC AUTO: 49 FL (ref 37–54)
EOSINOPHIL # BLD AUTO: 0.02 10*3/MM3 (ref 0–0.7)
EOSINOPHIL NFR BLD AUTO: 0.3 % (ref 0.3–6.2)
ERYTHROCYTE [DISTWIDTH] IN BLOOD BY AUTOMATED COUNT: 14.4 % (ref 11.5–14.5)
ERYTHROCYTE [DISTWIDTH] IN BLOOD BY AUTOMATED COUNT: 14.5 % (ref 11.5–14.5)
GFR SERPL CREATININE-BSD FRML MDRD: 104 ML/MIN/1.73
GLUCOSE BLD-MCNC: 97 MG/DL (ref 65–99)
GLUCOSE BLDC GLUCOMTR-MCNC: 102 MG/DL (ref 70–130)
HCT VFR BLD AUTO: 23.7 % (ref 40.4–52.2)
HCT VFR BLD AUTO: 26.8 % (ref 40.4–52.2)
HGB BLD-MCNC: 7.5 G/DL (ref 13.7–17.6)
HGB BLD-MCNC: 8.5 G/DL (ref 13.7–17.6)
IMM GRANULOCYTES # BLD: 0 10*3/MM3 (ref 0–0.03)
IMM GRANULOCYTES NFR BLD: 0 % (ref 0–0.5)
LYMPHOCYTES # BLD AUTO: 1.9 10*3/MM3 (ref 0.9–4.8)
LYMPHOCYTES NFR BLD AUTO: 30.3 % (ref 19.6–45.3)
MCH RBC QN AUTO: 29.4 PG (ref 27–32.7)
MCH RBC QN AUTO: 29.6 PG (ref 27–32.7)
MCHC RBC AUTO-ENTMCNC: 31.6 G/DL (ref 32.6–36.4)
MCHC RBC AUTO-ENTMCNC: 31.7 G/DL (ref 32.6–36.4)
MCV RBC AUTO: 92.9 FL (ref 79.8–96.2)
MCV RBC AUTO: 93.4 FL (ref 79.8–96.2)
MONOCYTES # BLD AUTO: 0.63 10*3/MM3 (ref 0.2–1.2)
MONOCYTES NFR BLD AUTO: 10 % (ref 5–12)
NEUTROPHILS # BLD AUTO: 3.71 10*3/MM3 (ref 1.9–8.1)
NEUTROPHILS NFR BLD AUTO: 59.2 % (ref 42.7–76)
PHOSPHATE SERPL-MCNC: 3.1 MG/DL (ref 2.5–4.5)
PLATELET # BLD AUTO: 161 10*3/MM3 (ref 140–500)
PLATELET # BLD AUTO: 192 10*3/MM3 (ref 140–500)
PMV BLD AUTO: 11.1 FL (ref 6–12)
PMV BLD AUTO: 11.2 FL (ref 6–12)
POTASSIUM BLD-SCNC: 3.5 MMOL/L (ref 3.5–5.2)
RBC # BLD AUTO: 2.55 10*6/MM3 (ref 4.6–6)
RBC # BLD AUTO: 2.87 10*6/MM3 (ref 4.6–6)
SODIUM BLD-SCNC: 142 MMOL/L (ref 136–145)
WBC NRBC COR # BLD: 6.27 10*3/MM3 (ref 4.5–10.7)
WBC NRBC COR # BLD: 8.36 10*3/MM3 (ref 4.5–10.7)

## 2018-01-09 PROCEDURE — 85027 COMPLETE CBC AUTOMATED: CPT | Performed by: INTERNAL MEDICINE

## 2018-01-09 PROCEDURE — 80069 RENAL FUNCTION PANEL: CPT | Performed by: INTERNAL MEDICINE

## 2018-01-09 PROCEDURE — 99232 SBSQ HOSP IP/OBS MODERATE 35: CPT | Performed by: INTERNAL MEDICINE

## 2018-01-09 PROCEDURE — 82962 GLUCOSE BLOOD TEST: CPT

## 2018-01-09 PROCEDURE — 85025 COMPLETE CBC W/AUTO DIFF WBC: CPT | Performed by: INTERNAL MEDICINE

## 2018-01-09 PROCEDURE — 25010000002 ONDANSETRON PER 1 MG: Performed by: INTERNAL MEDICINE

## 2018-01-09 RX ORDER — ONDANSETRON 2 MG/ML
4 INJECTION INTRAMUSCULAR; INTRAVENOUS EVERY 6 HOURS PRN
Status: DISCONTINUED | OUTPATIENT
Start: 2018-01-09 | End: 2018-01-10 | Stop reason: HOSPADM

## 2018-01-09 RX ADMIN — CARVEDILOL 6.25 MG: 6.25 TABLET, FILM COATED ORAL at 19:00

## 2018-01-09 RX ADMIN — ATORVASTATIN CALCIUM 20 MG: 20 TABLET, FILM COATED ORAL at 08:32

## 2018-01-09 RX ADMIN — CARVEDILOL 6.25 MG: 6.25 TABLET, FILM COATED ORAL at 08:32

## 2018-01-09 RX ADMIN — ASPIRIN 81 MG: 81 TABLET ORAL at 08:32

## 2018-01-09 RX ADMIN — SODIUM CHLORIDE 8 MG/HR: 900 INJECTION INTRAVENOUS at 03:05

## 2018-01-09 RX ADMIN — HYDROCODONE BITARTRATE AND ACETAMINOPHEN 1 TABLET: 10; 325 TABLET ORAL at 13:40

## 2018-01-09 RX ADMIN — SUCRALFATE 1 G: 1 SUSPENSION ORAL at 06:47

## 2018-01-09 RX ADMIN — SODIUM CHLORIDE 8 MG/HR: 900 INJECTION INTRAVENOUS at 22:12

## 2018-01-09 RX ADMIN — SODIUM CHLORIDE 8 MG/HR: 900 INJECTION INTRAVENOUS at 07:27

## 2018-01-09 RX ADMIN — SODIUM CHLORIDE 8 MG/HR: 900 INJECTION INTRAVENOUS at 12:34

## 2018-01-09 RX ADMIN — SUCRALFATE 1 G: 1 SUSPENSION ORAL at 19:00

## 2018-01-09 RX ADMIN — HYDROCODONE BITARTRATE AND ACETAMINOPHEN 1 TABLET: 10; 325 TABLET ORAL at 20:03

## 2018-01-09 RX ADMIN — ONDANSETRON 4 MG: 2 INJECTION INTRAMUSCULAR; INTRAVENOUS at 18:14

## 2018-01-09 RX ADMIN — CLOPIDOGREL 75 MG: 75 TABLET, FILM COATED ORAL at 08:32

## 2018-01-09 RX ADMIN — SODIUM CHLORIDE 8 MG/HR: 900 INJECTION INTRAVENOUS at 17:36

## 2018-01-09 RX ADMIN — SUCRALFATE 1 G: 1 SUSPENSION ORAL at 12:35

## 2018-01-09 RX ADMIN — HYDROCODONE BITARTRATE AND ACETAMINOPHEN 1 TABLET: 10; 325 TABLET ORAL at 07:26

## 2018-01-09 NOTE — PROGRESS NOTES
[facility]  Inpatient Progress Note    Reason for Follow Up:  Upper gi bleed    Subjective     Interval History:   BM at 2am, formed but dark.  No pain, nausea, vomiting.  Back on asa and plavix.  Discussed Hb down this morning.  Remains on pantoprazole.    Current Facility-Administered Medications:   •  aspirin EC tablet 81 mg, 81 mg, Oral, Daily, Nick Sahu MD, 81 mg at 01/09/18 0832  •  atorvastatin (LIPITOR) tablet 20 mg, 20 mg, Oral, Daily, Kb Gann MD, 20 mg at 01/09/18 0832  •  carvedilol (COREG) tablet 6.25 mg, 6.25 mg, Oral, BID With Meals, Nick Sahu MD, 6.25 mg at 01/09/18 0832  •  clopidogrel (PLAVIX) tablet 75 mg, 75 mg, Oral, Daily, Nick Sahu MD, 75 mg at 01/09/18 0832  •  HYDROcodone-acetaminophen (NORCO)  MG per tablet 1 tablet, 1 tablet, Oral, Q4H PRN, Raul Gann MD, 1 tablet at 01/09/18 0726  •  morphine injection 2 mg, 2 mg, Intravenous, Q4H PRN, Raul Gann MD  •  [COMPLETED] pantoprazole (PROTONIX) injection 80 mg, 80 mg, Intravenous, Once, 80 mg at 01/06/18 1901 **AND** pantoprazole (PROTONIX) 40 mg/100 mL (0.4 mg/mL) in 0.9% NS IVPB, 8 mg/hr, Intravenous, Continuous, Neil Osorio MD, Last Rate: 20 mL/hr at 01/09/18 0727, 8 mg/hr at 01/09/18 0727  •  sodium chloride 0.9 % flush 10 mL, 10 mL, Intravenous, PRN, Neil Osorio MD  •  sucralfate (CARAFATE) 1 GM/10ML suspension 1 g, 1 g, Oral, Q6H, Francesca Pascual MD, 1 g at 01/09/18 0647  •  zolpidem (AMBIEN) tablet 5 mg, 5 mg, Oral, Nightly PRN, Raul Gann MD, 5 mg at 01/08/18 2330  Review of Systems:    All systems were reviewed and negative except for:  Gastrointestinal: postitive for  dark but formed stool this morning    Objective     Vital Signs  Temp:  [97.4 °F (36.3 °C)-98.7 °F (37.1 °C)] 97.5 °F (36.4 °C)  Heart Rate:  [] 91  Resp:  [20] 20  BP: (109-145)/() 129/86  Body mass index is 33.97 kg/(m^2).    Intake/Output Summary (Last 24 hours) at 01/09/18 1037  Last data  filed at 01/09/18 0900   Gross per 24 hour   Intake          2384.75 ml   Output             4250 ml   Net         -1865.25 ml     I/O this shift:  In: 240 [P.O.:240]  Out: 800 [Urine:800]     Physical Exam:   General: patient awake, alert and cooperative   Eyes: Normal lids and lashes, no scleral icterus   Neck: supple, normal ROM   Skin: warm and dry, not jaundiced   Cardiovascular: regular rhythm and rate, no murmurs auscultated   Pulm: clear to auscultation bilaterally, regular and unlabored   Abdomen: soft, nontender, nondistended; normal bowel sounds   Rectal: deferred   Extremities: no rash or edema   Psychiatric: Normal mood and behavior; memory intact     Results Review:     I reviewed the patient's new clinical results.      Results from last 7 days  Lab Units 01/09/18  0450 01/08/18  1606 01/08/18  0808  01/06/18  1845   WBC 10*3/mm3 6.27  --   --   --  9.34   HEMOGLOBIN g/dL 7.5* 7.9* 8.1*  < > 11.4*   HEMATOCRIT % 23.7* 24.4* 24.7*  < > 35.5*   PLATELETS 10*3/mm3 161  --   --   --  247   < > = values in this interval not displayed.    Results from last 7 days  Lab Units 01/09/18  0450 01/08/18  0808 01/06/18  1939   SODIUM mmol/L 142 141 141   POTASSIUM mmol/L 3.5 4.0 4.0   CHLORIDE mmol/L 105 106 106   CO2 mmol/L 26.1 22.6 20.1*   BUN mg/dL 10 10 45*   CREATININE mg/dL 0.82 0.79 1.13   CALCIUM mg/dL 8.8 9.0 8.3*   BILIRUBIN mg/dL  --   --  0.4   ALK PHOS U/L  --   --  53   ALT (SGPT) U/L  --   --  52*   AST (SGOT) U/L  --   --  18   GLUCOSE mg/dL 97 124* 105*           Lab Results  Lab Value Date/Time   LIPASE 33 12/03/2014 1645       Radiology:  XR Chest 1 View   Final Result          Assessment/Plan     Patient Active Problem List   Diagnosis   • ST elevation myocardial infarction (STEMI)   • Coronary artery disease involving native coronary artery of native heart without angina pectoris   • Essential hypertension   • Hyperlipidemia   • Upper GI bleed       Impression  1. Upper GI bleed: due to  fundus ulcer, clipped x 1.  2. Blood loss anemia: Hb declined slightly, ? equilibration  3. STEMI with LAUREN: back on asa and plavix     Plan  -continue ppi gtt through today and then BID x 3 months  -repeat EGD in 3 months to assess healing of ulcer  -follow up stool for H pylori  -close monitoring on plavix  -continue full liquids for now  -second look of bleeding persists, Hb declines  -transfusion as per primary    I discussed the patients findings and my recommendations with patient and nursing staff.    Camilla Reeves MD

## 2018-01-09 NOTE — PAYOR COMM NOTE
"Ruddy Moore (42 y.o. Male)     Please see attached clinical update.     REF#[id number]    PLEASE CALL   OR  [phone] WITH CONTINUED STAY AUTHORIZATION.     THANK YOU    CHARISSE CONNER LPN, CCP    Date of Birth Social Security Number Address Home Phone MRN    [id number]  [id number] [facility] [id number] [phone] [id number]    [employer] Status          None Single       Admission Date Admission Type Admitting Provider Attending Provider Department, Room/Bed    1/6/18 Emergency Raul Gann MD Kellie, Scott P, MD [address], 510/1    Discharge Date Discharge Disposition Discharge Destination                      Attending Provider: Raul Gann MD     Allergies:  No Known Allergies    Isolation:  None   Infection:  None   Code Status:  Prior    Ht:  175.3 cm (69\")   Wt:  104 kg (230 lb)    Admission Cmt:  None   Principal Problem:  None                Active Insurance as of 1/6/2018     Primary Coverage     Payor Plan Insurance Group Employer/Plan Group    PASSPORT PASSPORT MEDICAID     Payor Plan Address Payor Plan Phone Number Effective From Effective To    [address] [phone] 9/1/2014     [place] [address]       Subscriber Name Subscriber Birth Date Member ID       RUDDY MOORE 1975 [id number]                 Emergency Contacts      (Rel.) Home Phone Work Phone Mobile Phone    ContrerasKimberlee sutton (Sister) -- -- [phone]    Ngoc Bond (Other) -- -- [phone]              Intake & Output (last day)       01/08 0701 - 01/09 0700 01/09 0701 - 01/10 0700    P.O. 800 240    I.V. (mL/kg) 1018.8 (9.8)     IV Piggyback 326     Total Intake(mL/kg) 2144.8 (20.6) 240 (2.3)    Urine (mL/kg/hr) 4280 (1.7) 1350 (2)    Stool 0 (0)     Total Output 4280 1350    Net -2135.3 -1110          Unmeasured Urine Occurrence 1 x     Unmeasured Stool Occurrence 2 x         Orders (last 24 hrs)     Start     Ordered    " 01/09/18 0600  CBC & Differential  Daily      01/08/18 1145    01/09/18 0600  Renal Function Panel  Daily      01/08/18 1146    01/09/18 0600  CBC Auto Differential  PROCEDURE ONCE      01/09/18 0001    01/08/18 2308  zolpidem (AMBIEN) tablet 5 mg  Nightly PRN      01/08/18 2308    01/08/18 1800  carvedilol (COREG) tablet 6.25 mg  2 Times Daily With Meals      01/08/18 1345    01/08/18 1430  clopidogrel (PLAVIX) tablet 75 mg  Daily      01/08/18 1345    01/08/18 1430  aspirin EC tablet 81 mg  Daily      01/08/18 1345    01/08/18 1230  atorvastatin (LIPITOR) tablet 20 mg  Daily      01/08/18 1155    01/07/18 1930  sucralfate (CARAFATE) 1 GM/10ML suspension 1 g  Every 6 Hours Scheduled      01/07/18 1728    01/07/18 0000  Hemoglobin & Hematocrit, Blood  Every 8 Hours      01/06/18 2133    01/06/18 2334  morphine injection 2 mg  Every 4 Hours PRN      01/06/18 2335    01/06/18 2333  HYDROcodone-acetaminophen (NORCO)  MG per tablet 1 tablet  Every 4 Hours PRN      01/06/18 2335    01/06/18 1855  pantoprazole (PROTONIX) 40 mg/100 mL (0.4 mg/mL) in 0.9% NS IVPB  Continuous      01/06/18 1853    01/06/18 1843  sodium chloride 0.9 % flush 10 mL  As Needed      01/06/18 1843    Unscheduled  Oxygen Therapy- Nasal Cannula; 2 LPM; Titrate for SPO2: equal to or greater than, 92%  As Needed      01/06/18 1843    --  UPPER GI ENDOSCOPY      01/07/18 1000    --  UPPER GI ENDOSCOPY      01/07/18 1441             Physician Progress Notes (last 24 hours) (Notes from 1/8/2018  1:29 PM through 1/9/2018  1:29 PM)      Camilla Reeves MD at 1/9/2018 10:37 AM  Version 1 of 1         [facility], [facility]  Inpatient Progress Note    Reason for Follow Up:  Upper gi bleed    Subjective     Interval History:   BM at 2am, formed but dark.  No pain, nausea, vomiting.  Back on asa and plavix.  Discussed Hb down this morning.  Remains on pantoprazole.    Current Facility-Administered Medications:   •  aspirin EC tablet 81 mg,  81 mg, Oral, Daily, Nick Sahu MD, 81 mg at 01/09/18 0832  •  atorvastatin (LIPITOR) tablet 20 mg, 20 mg, Oral, Daily, Kb Gann MD, 20 mg at 01/09/18 0832  •  carvedilol (COREG) tablet 6.25 mg, 6.25 mg, Oral, BID With Meals, Nick Sahu MD, 6.25 mg at 01/09/18 0832  •  clopidogrel (PLAVIX) tablet 75 mg, 75 mg, Oral, Daily, Nick Sahu MD, 75 mg at 01/09/18 0832  •  HYDROcodone-acetaminophen (NORCO)  MG per tablet 1 tablet, 1 tablet, Oral, Q4H PRN, Raul Gann MD, 1 tablet at 01/09/18 0726  •  morphine injection 2 mg, 2 mg, Intravenous, Q4H PRN, Raul Gann MD  •  [COMPLETED] pantoprazole (PROTONIX) injection 80 mg, 80 mg, Intravenous, Once, 80 mg at 01/06/18 1901 **AND** pantoprazole (PROTONIX) 40 mg/100 mL (0.4 mg/mL) in 0.9% NS IVPB, 8 mg/hr, Intravenous, Continuous, Neil Osorio MD, Last Rate: 20 mL/hr at 01/09/18 0727, 8 mg/hr at 01/09/18 0727  •  sodium chloride 0.9 % flush 10 mL, 10 mL, Intravenous, PRN, Neil Osorio MD  •  sucralfate (CARAFATE) 1 GM/10ML suspension 1 g, 1 g, Oral, Q6H, Francesca Pascual MD, 1 g at 01/09/18 0647  •  zolpidem (AMBIEN) tablet 5 mg, 5 mg, Oral, Nightly PRN, Raul Gann MD, 5 mg at 01/08/18 2330  Review of Systems:    All systems were reviewed and negative except for:  Gastrointestinal: postitive for  dark but formed stool this morning    Objective     Vital Signs  Temp:  [97.4 °F (36.3 °C)-98.7 °F (37.1 °C)] 97.5 °F (36.4 °C)  Heart Rate:  [] 91  Resp:  [20] 20  BP: (109-145)/() 129/86  Body mass index is 33.97 kg/(m^2).    Intake/Output Summary (Last 24 hours) at 01/09/18 1037  Last data filed at 01/09/18 0900   Gross per 24 hour   Intake          2384.75 ml   Output             4250 ml   Net         -1865.25 ml     I/O this shift:  In: 240 [P.O.:240]  Out: 800 [Urine:800]     Physical Exam:   General: patient awake, alert and cooperative   Eyes: Normal lids and lashes, no scleral icterus   Neck: supple, normal ROM   Skin: warm and dry,  not jaundiced   Cardiovascular: regular rhythm and rate, no murmurs auscultated   Pulm: clear to auscultation bilaterally, regular and unlabored   Abdomen: soft, nontender, nondistended; normal bowel sounds   Rectal: deferred   Extremities: no rash or edema   Psychiatric: Normal mood and behavior; memory intact     Results Review:     I reviewed the patient's new clinical results.      Results from last 7 days  Lab Units 01/09/18  0450 01/08/18  1606 01/08/18  0808  01/06/18  1845   WBC 10*3/mm3 6.27  --   --   --  9.34   HEMOGLOBIN g/dL 7.5* 7.9* 8.1*  < > 11.4*   HEMATOCRIT % 23.7* 24.4* 24.7*  < > 35.5*   PLATELETS 10*3/mm3 161  --   --   --  247   < > = values in this interval not displayed.    Results from last 7 days  Lab Units 01/09/18  0450 01/08/18  0808 01/06/18  1939   SODIUM mmol/L 142 141 141   POTASSIUM mmol/L 3.5 4.0 4.0   CHLORIDE mmol/L 105 106 106   CO2 mmol/L 26.1 22.6 20.1*   BUN mg/dL 10 10 45*   CREATININE mg/dL 0.82 0.79 1.13   CALCIUM mg/dL 8.8 9.0 8.3*   BILIRUBIN mg/dL  --   --  0.4   ALK PHOS U/L  --   --  53   ALT (SGPT) U/L  --   --  52*   AST (SGOT) U/L  --   --  18   GLUCOSE mg/dL 97 124* 105*           Lab Results  Lab Value Date/Time   LIPASE 33 12/03/2014 1645       Radiology:  XR Chest 1 View   Final Result          Assessment/Plan     Patient Active Problem List   Diagnosis   • ST elevation myocardial infarction (STEMI)   • Coronary artery disease involving native coronary artery of native heart without angina pectoris   • Essential hypertension   • Hyperlipidemia   • Upper GI bleed       Impression  1. Upper GI bleed: due to fundus ulcer, clipped x 1.  2. Blood loss anemia: Hb declined slightly, ? equilibration  3. STEMI with LAUREN: back on asa and plavix     Plan  -continue ppi gtt through today and then BID x 3 months  -repeat EGD in 3 months to assess healing of ulcer  -follow up stool for H pylori  -close monitoring on plavix  -continue full liquids for now  -second look of  bleeding persists, Hb declines  -transfusion as per primary    I discussed the patients findings and my recommendations with patient and nursing staff.    Camilla Reeves MD     Electronically signed by Camilla Reeves MD at 1/9/2018 10:40 AM     All medication doses during the admission are shown, including meds that are no longer on order.     Scheduled Meds Sorted by Name for Ruddy Griffin as of 1/9/18 through 1/9/18       1 Day 3 Days 7 Days 10 Days < Today >    Legend:                          Inactive     Active     Other Encounter    Linked               Medications 01/09/18   aspirin EC tablet 81 mg  Dose: 81 mg Freq: Daily Route: PO  Start: 01/08/18 1430    Admin Instructions:   Herbal/drug interaction: Avoid use with ginkgo biloba. Do not crush or chew.  Do not exceed 4 grams of aspirin in a 24 hr period.    If given for pain, use the following pain scale:   Mild Pain = Pain Score of 1-3, CPOT 1-2  Moderate Pain = Pain Score of 4-6, CPOT 3-4  Severe Pain = Pain Score of 7-10, CPOT 5-8    0832                            atorvastatin (LIPITOR) tablet 20 mg  Dose: 20 mg Freq: Daily Route: PO  Start: 01/08/18 1230    Admin Instructions:   Avoid grapefruit juice.    0832                            carvedilol (COREG) tablet 6.25 mg  Dose: 6.25 mg Freq: 2 Times Daily With Meals Route: PO  Start: 01/08/18 1800    Admin Instructions:   Give with food.    0832     1800                          clopidogrel (PLAVIX) tablet 75 mg  Dose: 75 mg Freq: Daily Route: PO  Start: 01/08/18 1430    0832                            famotidine (PEPCID) injection 20 mg  Dose: 20 mg Freq: Once Route: IV  Start: 01/07/18 1515 End: 01/07/18 1451    Admin Instructions:   Dilute to 10 mL total volume with 0.9% NaCl and give IV push over 2 minutes.       fentaNYL citrate (PF) (SUBLIMAZE) 100 MCG/2ML injection  - ADS Override Pull  Start: 01/07/18 1603 End: 01/07/18 1617    Admin Instructions:   Created by cabinet override  If given  for pain, use the following pain scale:  Mild Pain = Pain Score of 1-3, CPOT 1-2  Moderate Pain = Pain Score of 4-6, CPOT 3-4  Severe Pain = Pain Score of 7-10, CPOT 5-8       pantoprazole (PROTONIX) injection 80 mg  Dose: 80 mg Freq: Once Route: IV  Indications of Use: Gastrointestinal (GI) Bleed  Start: 01/06/18 1855 End: 01/06/18 1901    Admin Instructions:   Dilute with 10 mL of 0.9% NaCl and give IV push over 2 minutes.       promethazine (PHENERGAN) injection 6.25 mg  Dose: 6.25 mg Freq: Once Route: IV  Start: 01/06/18 2015 End: 01/06/18 2028    Admin Instructions:   For IV administration must dilute in normal saline to 20 mL and give slow IV push  If giving IV, dilute dose in 20 mL NS and slow IV push over 3-5 minutes. Administer through large bore vein (not hand or wrist) through running IV line at port furthest from patient's vein.       sodium chloride 0.9 % bolus 1,000 mL  Dose: 1,000 mL Freq: Once Route: IV  Last Dose: Stopped (01/06/18 2026)  Start: 01/06/18 1929 End: 01/06/18 2026       sodium chloride 0.9 % bolus 1,000 mL  Dose: 1,000 mL Freq: Once Route: IV  Last Dose: Stopped (01/06/18 2026)  Start: 01/06/18 1843 End: 01/06/18 2026       sucralfate (CARAFATE) 1 GM/10ML suspension 1 g  Dose: 1 g Freq: Every 6 Hours Scheduled Route: PO  Start: 01/07/18 1930    0647     1235     1800                        Medications 01/09/18         Continuous Meds Sorted by Name for Ruddy Griffin as of 1/9/18 through 1/9/18      Legend:                          Inactive     Active     Other Encounter    Linked               Medications 01/09/18   lactated ringers infusion  Rate: 9 mL/hr Freq: Continuous Route: IV  Last Dose: 9 mL/hr (01/07/18 1451)  Start: 01/07/18 1515 End: 01/07/18 1728       lactated ringers infusion  Freq: Continuous PRN Route: IV  Start: 01/07/18 1126 End: 01/07/18 1220       lactated ringers infusion 1,000 mL  Rate: 25 mL/hr Freq: Continuous PRN Route: IV  PRN Comment: Start Prior to  Procedure  Start: 01/07/18 1130 End: 01/07/18 1728       pantoprazole (PROTONIX) 40 mg/100 mL (0.4 mg/mL) in 0.9% NS IVPB  Rate: 20 mL/hr Freq: Continuous Route: IV  Indications of Use: Gastrointestinal (GI) Bleed  Last Dose: 8 mg/hr (01/09/18 1234)  Start: 01/06/18 1855    0305     0727     1234                        Propofol (DIPRIVAN) injection  Freq: Continuous PRN  Last Dose: Stopped (01/07/18 1200)  Start: 01/07/18 1149 End: 01/07/18 1220       sodium chloride 0.9 % infusion  Rate: 125 mL/hr Freq: Continuous Route: IV  Last Dose: 125 mL/hr (01/08/18 0109)  Start: 01/06/18 1855 End: 01/08/18 1155       Medications 01/09/18         PRN Meds Sorted by Name for Elias Ruddy S as of 1/9/18 through 1/9/18      Legend:                          Inactive     Active     Other Encounter    Linked               Medications 01/09/18   dexamethasone (DECADRON) injection  Freq: As Needed  Start: 01/07/18 1624 End: 01/07/18 1656       diphenhydrAMINE (BENADRYL) injection 12.5 mg  Dose: 12.5 mg Freq: Every 15 Minutes PRN Route: IV  PRN Reason: Itching  PRN Comment: May repeat x 1  Start: 01/07/18 1717 End: 01/07/18 1741    Admin Instructions:    This med may be ordered in other forms and routes. Before giving verify the last time the drug was given by any route/form. 25 mg may be given IV push over less than 1 minute.       ePHEDrine injection 5 mg  Dose: 5 mg Freq: Once As Needed Route: IV  PRN Comment: symptomatic hypotension - Notify attending anesthesiologist if this needs to be given  Start: 01/07/18 1717 End: 01/07/18 1741    Admin Instructions:   Dilute with NS to 5-10 mg/mL.       esmolol (BREVIBLOC) injection  Freq: As Needed  Start: 01/07/18 1645 End: 01/07/18 1656       fentaNYL citrate (PF) (SUBLIMAZE) injection  Freq: As Needed Route: IV  PRN Reason: Severe Pain   Start: 01/07/18 1617 End: 01/07/18 1656       fentaNYL citrate (PF) (SUBLIMAZE) injection 50 mcg  Dose: 50 mcg Freq: Every 5 Minutes PRN Route:  IV  PRN Reasons: Moderate Pain ,Severe Pain   Start: 01/07/18 1717 End: 01/07/18 1741    Admin Instructions:   May alternate fentanyl with hydromorphone using fentanyl first.    Maximum total dose of fentanyl is 200 mcg.  If given for pain, use the following pain scale:  Mild Pain = Pain Score of 1-3, CPOT 1-2  Moderate Pain = Pain Score of 4-6, CPOT 3-4  Severe Pain = Pain Score of 7-10, CPOT 5-8       fentaNYL citrate (PF) (SUBLIMAZE) injection 50 mcg  Dose: 50 mcg Freq: Every 10 Minutes PRN Route: IV  PRN Reason: Severe Pain   Start: 01/07/18 1438 End: 01/07/18 1728    Admin Instructions:   Maximum total dose of fentanyl is 100 mcg.  If given for pain, use the following pain scale:  Mild Pain = Pain Score of 1-3, CPOT 1-2  Moderate Pain = Pain Score of 4-6, CPOT 3-4  Severe Pain = Pain Score of 7-10, CPOT 5-8       flumazenil (ROMAZICON) injection 0.2 mg  Dose: 0.2 mg Freq: As Needed Route: IV  PRN Comment: for benzodiazepine induced unresponsiveness or sedation  Indications of Use: BENZODIAZEPINE-INDUCED SEDATION  Start: 01/07/18 1717 End: 01/07/18 1741    Admin Instructions:   Notify Anesthesia if given  ** give IV over 15-30 seconds **       hydrALAZINE (APRESOLINE) injection 5 mg  Dose: 5 mg Freq: Every 10 Minutes PRN Route: IV  PRN Reason: High Blood Pressure  PRN Comment: for systolic blood pressure greater than 180 mmHg or diastolic blood pressure greater than 105 mmHg  Start: 01/07/18 1717 End: 01/07/18 1741    Admin Instructions:   Use labetalol first THEN hydralazine second if labetalol fails to reduce systolic blood pressure to less than 180 mmHg or diastolic blood pressure less than 105 mmHg    Maximum dose of hydralazine is 20 mg       HYDROcodone-acetaminophen (NORCO)  MG per tablet 1 tablet  Dose: 1 tablet Freq: Every 4 Hours PRN Route: PO  PRN Reason: Moderate Pain   Start: 01/06/18 2333 End: 01/16/18 2332    Admin Instructions:   Do not exceed 4 grams of acetaminophen in a 24 hr  period.    If given for pain, use the following pain scale:   Mild Pain = Pain Score of 1-3, CPOT 1-2  Moderate Pain = Pain Score of 4-6, CPOT 3-4  Severe Pain = Pain Score of 7-10, CPOT 5-8    0726                            HYDROcodone-acetaminophen (NORCO) 7.5-325 MG per tablet 1 tablet  Dose: 1 tablet Freq: Once As Needed Route: PO  PRN Reason: Mild Pain   Start: 01/07/18 1717 End: 01/07/18 1741    Admin Instructions:   Do not exceed 4 grams of acetaminophen in a 24 hr period.    If given for pain, use the following pain scale:   Mild Pain = Pain Score of 1-3, CPOT 1-2  Moderate Pain = Pain Score of 4-6, CPOT 3-4  Severe Pain = Pain Score of 7-10, CPOT 5-8       HYDROmorphone (DILAUDID) injection 0.5 mg  Dose: 0.5 mg Freq: Every 5 Minutes PRN Route: IV  PRN Reasons: Moderate Pain ,Severe Pain   Start: 01/07/18 1717 End: 01/07/18 1741    Admin Instructions:   May alternate fentanyl with hydromorphone using fentanyl first.    Maximum total dose of hydromorphone is 2 mg.  If given for pain, use the following pain scale:  Mild Pain = Pain Score of 1-3, CPOT 1-2  Moderate Pain = Pain Score of 4-6, CPOT 3-4  Severe Pain = Pain Score of 7-10, CPOT 5-8       labetalol (NORMODYNE,TRANDATE) injection 5 mg  Dose: 5 mg Freq: Every 5 Minutes PRN Route: IV  PRN Reason: High Blood Pressure  PRN Comment: for systolic blood pressure greater than 180 mmHg or diastolic blood pressure greater than 105 mmHg  Start: 01/07/18 1717 End: 01/07/18 1741    Admin Instructions:   Use labetalol first THEN hydralazine second if labetalol fails to reduce systolic blood pressure to less than 180 mmHg or diastolic blood pressure less than 105 mmHg    Hold for heart rate less than 60.    Maximum dose of labetalol is 20 mg  Give by slow IV Push each 20mg (or less) over 2 minutes       lactated ringers infusion  Freq: Continuous PRN Route: IV  Start: 01/07/18 1126 End: 01/07/18 1220       lactated ringers infusion 1,000 mL  Rate: 25 mL/hr Freq:  Continuous PRN Route: IV  PRN Comment: Start Prior to Procedure  Start: 01/07/18 1130 End: 01/07/18 1728       lidocaine (XYLOCAINE) 1 % injection 0.5 mL  Dose: 0.5 mL Freq: Once As Needed Route: ID  PRN Comment: Prior to IV Insertion  Start: 01/07/18 1130 End: 01/07/18 1728       lidocaine (XYLOCAINE) 2% injection  Freq: As Needed Route: IJ  Start: 01/07/18 1619 End: 01/07/18 1656       lidocaine (XYLOCAINE) 2% injection  Freq: As Needed  Start: 01/07/18 1149 End: 01/07/18 1220       midazolam (VERSED) injection 1 mg  Dose: 1 mg Freq: Every 5 Minutes PRN Route: IV  PRN Reason: Anxiety  PRN Comment: Max 4mg midazolam TOTAL  Start: 01/07/18 1438 End: 01/07/18 1728    Admin Instructions:   Maximum total dose of midazolam is 4 mg.       Or  midazolam (VERSED) injection 2 mg  Dose: 2 mg Freq: Every 5 Minutes PRN Route: IV  PRN Reason: Anxiety  PRN Comment: Max 4mg midazolam TOTAL  Start: 01/07/18 1438 End: 01/07/18 1728    Admin Instructions:   Maximum total dose of midazolam is 4 mg.       morphine injection 2 mg  Dose: 2 mg Freq: Every 4 Hours PRN Route: IV  PRN Reason: Severe Pain   Start: 01/06/18 2334 End: 01/16/18 2333    Admin Instructions:   If given for pain, use the following pain scale:  Mild Pain = Pain Score of 1-3, CPOT 1-2  Moderate Pain = Pain Score of 4-6, CPOT 3-4  Severe Pain = Pain Score of 7-10, CPOT 5-8       naloxone (NARCAN) injection 0.2 mg  Dose: 0.2 mg Freq: As Needed Route: IV  PRN Reasons: Opioid Reversal,Respiratory Depression  PRN Comment: unresponsiveness, decrease oxygen saturation  Indications of Use: ACUTE RESPIRATORY FAILURE,OPIOID-INDUCED RESPIRATORY DEPRESSION  Start: 01/07/18 1717 End: 01/07/18 1741    Admin Instructions:   Notify Anesthesia if given       ondansetron (ZOFRAN) injection  Freq: As Needed Route: IV  PRN Reasons: Nausea,Vomiting  Start: 01/07/18 1627 End: 01/07/18 1656       ondansetron (ZOFRAN) injection 4 mg  Dose: 4 mg Freq: Once As Needed Route: IV  PRN Reasons:  Nausea,Vomiting  Indications of Use: POSTOPERATIVE NAUSEA AND VOMITING  Start: 01/07/18 1717 End: 01/07/18 1741    Admin Instructions:   If BOTH ondansetron (ZOFRAN) and promethazine (PHENERGAN) are ordered use ondansetron first and THEN promethazine IF ondansetron is ineffective.       oxyCODONE-acetaminophen (PERCOCET) 7.5-325 MG per tablet 1 tablet  Dose: 1 tablet Freq: Once As Needed Route: PO  PRN Reason: Moderate Pain   Start: 01/07/18 1717 End: 01/07/18 1741    Admin Instructions:   Do not exceed more than 4 g acetaminophen in 24 hour period  Do not exceed 4 grams of acetaminophen in a 24 hr period.    If given for pain, use the following pain scale:   Mild Pain = Pain Score of 1-3, CPOT 1-2  Moderate Pain = Pain Score of 4-6, CPOT 3-4  Severe Pain = Pain Score of 7-10, CPOT 5-8       phenylephrine (CATRACHITO-SYNEPHRINE) injection  Freq: As Needed Route: IV  Start: 01/07/18 1619 End: 01/07/18 1656       promethazine (PHENERGAN) injection 12.5 mg  Dose: 12.5 mg Freq: Once As Needed Route: IV  PRN Reasons: Nausea,Vomiting  Start: 01/07/18 1717 End: 01/07/18 1741    Admin Instructions:   If BOTH ondansetron (ZOFRAN) and promethazine (PHENERGAN) are ordered use ondansetron first and THEN promethazine IF ondansetron is ineffective.  If giving IV, dilute dose in 20 mL NS and slow IV push over 3-5 minutes. Administer through large bore vein (not hand or wrist) through running IV line at port furthest from patient's vein.       Or  promethazine (PHENERGAN) injection 12.5 mg  Dose: 12.5 mg Freq: Once As Needed Route: IM  PRN Reasons: Nausea,Vomiting  Start: 01/07/18 1717 End: 01/07/18 1741    Admin Instructions:   If BOTH ondansetron (ZOFRAN) and promethazine (PHENERGAN) are ordered use ondansetron first and THEN promethazine IF ondansetron is ineffective.  If giving IV, dilute dose in 20 mL NS and slow IV push over 3-5 minutes. Administer through large bore vein (not hand or wrist) through running IV line at port furthest  from patient's vein.       Or  promethazine (PHENERGAN) suppository 25 mg  Dose: 25 mg Freq: Once As Needed Route: RE  PRN Reasons: Nausea,Vomiting  Start: 01/07/18 1717 End: 01/07/18 1741    Admin Instructions:   If BOTH ondansetron (ZOFRAN) and promethazine (PHENERGAN) are ordered use ondansetron first and THEN promethazine IF ondansetron is ineffective.       Or  promethazine (PHENERGAN) tablet 25 mg  Dose: 25 mg Freq: Once As Needed Route: PO  PRN Reasons: Nausea,Vomiting  Start: 01/07/18 1717 End: 01/07/18 1741    Admin Instructions:   If BOTH ondansetron (ZOFRAN) and promethazine (PHENERGAN) are ordered use ondansetron first and THEN promethazine IF ondansetron is ineffective.         promethazine (PHENERGAN) tablet 12.5 mg  Dose: 12.5 mg Freq: Once As Needed Route: PO  PRN Reasons: Nausea,Vomiting  Start: 01/07/18 1717 End: 01/07/18 1741    Admin Instructions:          Propofol (DIPRIVAN) injection  Freq: As Needed Route: IV  Start: 01/07/18 1619 End: 01/07/18 1656       Propofol (DIPRIVAN) injection  Freq: Continuous PRN  Start: 01/07/18 1149 End: 01/07/18 1220       Propofol (DIPRIVAN) injection  Freq: As Needed Route: IV  Start: 01/07/18 1149 End: 01/07/18 1220       sodium chloride 0.9 % flush 1-10 mL  Dose: 1-10 mL Freq: As Needed Route: IV  PRN Reason: Line Care  Start: 01/07/18 1438 End: 01/07/18 1728       sodium chloride 0.9 % flush 10 mL  Dose: 10 mL Freq: As Needed Route: IV  PRN Reason: Line Care  Start: 01/06/18 1843       sodium chloride 0.9 % flush 3 mL  Dose: 3 mL Freq: As Needed Route: IV  PRN Reason: Line Care  Start: 01/07/18 1130 End: 01/07/18 1728       sodium chloride 9 mL with EPINEPHrine PF 1 mL mixture  Freq: As Needed  Start: 01/07/18 1633 End: 01/07/18 1653       sodium chloride 9 mL, EPINEPHrine PF 1 mL mixture  Freq: As Needed  Start: 01/07/18 1642 End: 01/07/18 1653       succinylcholine (ANECTINE) injection  Freq: As Needed Route: IV  Start: 01/07/18 1619 End: 01/07/18 1656        zolpidem (AMBIEN) tablet 5 mg  Dose: 5 mg Freq: Nightly PRN Route: PO  PRN Reason: Sleep  Start: 01/08/18 2308 End: 01/18/18 2307       Medications 01/09/18

## 2018-01-09 NOTE — PROGRESS NOTES
"  Daily Progress Note.   [address]  1/9/2018    Patient:  Name:  Ruddy Griffin  MRN:  [id number]  1975  42 y.o.  male         Interval History:  Doing well some melena. No hematochezia    Physical Exam:  /74 (BP Location: Left arm, Patient Position: Lying)  Pulse 75  Temp 98 °F (36.7 °C) (Oral)   Resp 18  Ht 175.3 cm (69\")  Wt 104 kg (230 lb)  SpO2 98%  BMI 33.97 kg/m2  Body mass index is 33.97 kg/(m^2).    Intake/Output Summary (Last 24 hours) at 01/09/18 1726  Last data filed at 01/09/18 1340   Gross per 24 hour   Intake              860 ml   Output             3180 ml   Net            -2320 ml     GENERAL:  NAD, Aox3  HEENT:  EOMI, nonicteric sclera, no JVD  PULMONARY:    CTAB, no wheeze, no crackles, no rhonchi, symmetric air entry  CARDIAC:  RRR no MRG, S1 S2  ABD: SNTND BS+  EXT: no c/c/e, pulses symmetric 2+ bilaterally  NEURO:  CNs II-XII intact, no focal deficits  SKIN: no lesions, no rash  PSYCH: appropriate mood  LYMPH: no cervical LAD    Scheduled meds:      aspirin 81 mg Oral Daily   atorvastatin 20 mg Oral Daily   carvedilol 6.25 mg Oral BID With Meals   clopidogrel 75 mg Oral Daily   sucralfate 1 g Oral Q6H       Data Review:   I reviewed the patient's medications, imaging and new clinical results.    Results from last 7 days  Lab Units 01/09/18  0450 01/08/18  1606 01/08/18  0808 01/08/18  0009 01/07/18  1751 01/07/18  0832 01/07/18  0023 01/06/18  1845   WBC 10*3/mm3 6.27  --   --   --   --   --   --  9.34   HEMOGLOBIN g/dL 7.5* 7.9* 8.1* 8.7* 8.9* 9.0* 9.7* 11.4*   PLATELETS 10*3/mm3 161  --   --   --   --   --   --  247       Results from last 7 days  Lab Units 01/09/18  0450 01/08/18  0808 01/06/18  1939   SODIUM mmol/L 142 141 141   POTASSIUM mmol/L 3.5 4.0 4.0   CHLORIDE mmol/L 105 106 106   CO2 mmol/L 26.1 22.6 20.1*   BUN mg/dL 10 10 45*   CREATININE mg/dL 0.82 0.79 1.13   GLUCOSE mg/dL 97 124* 105*   CALCIUM mg/dL 8.8 9.0 8.3*   PHOSPHORUS mg/dL " 3.1  --   --    Estimated Creatinine Clearance: 140.9 mL/min (by C-G formula based on Cr of 0.82).    Imaging Results (most recent)     Procedure Component Value Units Date/Time    XR Chest 1 View [[id number]] Collected:  01/06/18 1929     Updated:  01/06/18 1929    Narrative:       PORTABLE CHEST     HISTORY: Chest pain.     FINDINGS:  A single view of the chest demonstrates the heart to be  within normal limits in size. There is no evidence of focal infiltrate,  effusion or of congestive failure.             ASSESSMENT  /  PLAN:  UGIB  Hypotension resolved  ABLA - - gib  STEMI s/p stent recently - plavix on hold until cleared by GI/GIB resolved/addressed  cad    S/p EGD with visble vessel in fundus injected and clipped on ppi gtt  To floor  Serial hh  plavix per Gi/cards  Diet per gi noted    GI RECS:  Plan  -continue ppi gtt through today and then BID x 3 months  -repeat EGD in 3 months to assess healing of ulcer  -follow up stool for H pylori  -close monitoring on plavix  -continue full liquids for now  -second look of bleeding persists, Hb declines  -transfusion as per primary     Recheck cbc this pm, again in am    Kb Gann MD  [facility]  01/09/18

## 2018-01-09 NOTE — PLAN OF CARE
Problem: Fall Risk (Adult)  Goal: Absence of Falls  Outcome: Ongoing (interventions implemented as appropriate)      Problem: Skin Integrity Impairment, Risk/Actual (Adult)  Goal: Skin Integrity/Wound Healing  Outcome: Ongoing (interventions implemented as appropriate)      Problem: Gastrointestinal Bleeding (Adult)  Goal: Signs and Symptoms of Listed Potential Problems Will be Absent or Manageable (Gastrointestinal Bleeding)  Outcome: Ongoing (interventions implemented as appropriate)      Problem: Patient Care Overview (Adult)  Goal: Plan of Care Review  Outcome: Ongoing (interventions implemented as appropriate)   01/08/18 1932   Coping/Psychosocial Response Interventions   Plan Of Care Reviewed With patient   Patient Care Overview   Progress improving   Outcome Evaluation   Outcome Summary/Follow up Plan Transfer from [facility]. medicated for pain x1 since transfer to [facility]. monitoring h+h. on protonix gtt. tolerating full liquid diet.        Problem: Pain, Acute (Adult)  Goal: Acceptable Pain Control/Comfort Level  Outcome: Ongoing (interventions implemented as appropriate)

## 2018-01-10 VITALS
HEART RATE: 88 BPM | HEIGHT: 69 IN | TEMPERATURE: 98.1 F | SYSTOLIC BLOOD PRESSURE: 107 MMHG | WEIGHT: 227.07 LBS | DIASTOLIC BLOOD PRESSURE: 69 MMHG | BODY MASS INDEX: 33.63 KG/M2 | RESPIRATION RATE: 20 BRPM | OXYGEN SATURATION: 98 %

## 2018-01-10 LAB
ABO + RH BLD: NORMAL
ABO + RH BLD: NORMAL
ALBUMIN SERPL-MCNC: 3.1 G/DL (ref 3.5–5.2)
ANION GAP SERPL CALCULATED.3IONS-SCNC: 11.2 MMOL/L
BASOPHILS # BLD AUTO: 0.01 10*3/MM3 (ref 0–0.2)
BASOPHILS NFR BLD AUTO: 0.2 % (ref 0–1.5)
BH BB BLOOD EXPIRATION DATE: NORMAL
BH BB BLOOD EXPIRATION DATE: NORMAL
BH BB BLOOD TYPE BARCODE: 5100
BH BB BLOOD TYPE BARCODE: 5100
BH BB DISPENSE STATUS: NORMAL
BH BB DISPENSE STATUS: NORMAL
BH BB PRODUCT CODE: NORMAL
BH BB PRODUCT CODE: NORMAL
BH BB UNIT NUMBER: NORMAL
BH BB UNIT NUMBER: NORMAL
BUN BLD-MCNC: 7 MG/DL (ref 6–20)
BUN/CREAT SERPL: 7.8 (ref 7–25)
CALCIUM SPEC-SCNC: 8.8 MG/DL (ref 8.6–10.5)
CHLORIDE SERPL-SCNC: 103 MMOL/L (ref 98–107)
CO2 SERPL-SCNC: 27.8 MMOL/L (ref 22–29)
CREAT BLD-MCNC: 0.9 MG/DL (ref 0.76–1.27)
CROSSMATCH INTERPRETATION: NORMAL
CROSSMATCH INTERPRETATION: NORMAL
DEPRECATED RDW RBC AUTO: 48.4 FL (ref 37–54)
EOSINOPHIL # BLD AUTO: 0.04 10*3/MM3 (ref 0–0.7)
EOSINOPHIL NFR BLD AUTO: 0.6 % (ref 0.3–6.2)
ERYTHROCYTE [DISTWIDTH] IN BLOOD BY AUTOMATED COUNT: 14.4 % (ref 11.5–14.5)
GFR SERPL CREATININE-BSD FRML MDRD: 93 ML/MIN/1.73
GLUCOSE BLD-MCNC: 97 MG/DL (ref 65–99)
H PYLORI AG STL QL IA: NEGATIVE
HCT VFR BLD AUTO: 25.7 % (ref 40.4–52.2)
HGB BLD-MCNC: 8.1 G/DL (ref 13.7–17.6)
IMM GRANULOCYTES # BLD: 0.02 10*3/MM3 (ref 0–0.03)
IMM GRANULOCYTES NFR BLD: 0.3 % (ref 0–0.5)
LYMPHOCYTES # BLD AUTO: 2.01 10*3/MM3 (ref 0.9–4.8)
LYMPHOCYTES NFR BLD AUTO: 30.3 % (ref 19.6–45.3)
MCH RBC QN AUTO: 29.6 PG (ref 27–32.7)
MCHC RBC AUTO-ENTMCNC: 31.5 G/DL (ref 32.6–36.4)
MCV RBC AUTO: 93.8 FL (ref 79.8–96.2)
MONOCYTES # BLD AUTO: 0.59 10*3/MM3 (ref 0.2–1.2)
MONOCYTES NFR BLD AUTO: 8.9 % (ref 5–12)
NEUTROPHILS # BLD AUTO: 3.96 10*3/MM3 (ref 1.9–8.1)
NEUTROPHILS NFR BLD AUTO: 59.7 % (ref 42.7–76)
PHOSPHATE SERPL-MCNC: 3.8 MG/DL (ref 2.5–4.5)
PLATELET # BLD AUTO: 180 10*3/MM3 (ref 140–500)
PMV BLD AUTO: 10.8 FL (ref 6–12)
POTASSIUM BLD-SCNC: 3.7 MMOL/L (ref 3.5–5.2)
RBC # BLD AUTO: 2.74 10*6/MM3 (ref 4.6–6)
SODIUM BLD-SCNC: 142 MMOL/L (ref 136–145)
UNIT  ABO: NORMAL
UNIT  ABO: NORMAL
UNIT  RH: NORMAL
UNIT  RH: NORMAL
WBC NRBC COR # BLD: 6.63 10*3/MM3 (ref 4.5–10.7)

## 2018-01-10 PROCEDURE — 80069 RENAL FUNCTION PANEL: CPT | Performed by: INTERNAL MEDICINE

## 2018-01-10 PROCEDURE — 85025 COMPLETE CBC W/AUTO DIFF WBC: CPT | Performed by: INTERNAL MEDICINE

## 2018-01-10 PROCEDURE — 99232 SBSQ HOSP IP/OBS MODERATE 35: CPT | Performed by: INTERNAL MEDICINE

## 2018-01-10 RX ORDER — PANTOPRAZOLE SODIUM 40 MG/1
40 TABLET, DELAYED RELEASE ORAL DAILY
Qty: 60 TABLET | Refills: 2 | Status: ON HOLD | OUTPATIENT
Start: 2018-01-10 | End: 2020-07-16

## 2018-01-10 RX ORDER — ATORVASTATIN CALCIUM 20 MG/1
20 TABLET, FILM COATED ORAL NIGHTLY
Status: DISCONTINUED | OUTPATIENT
Start: 2018-01-10 | End: 2018-01-10 | Stop reason: HOSPADM

## 2018-01-10 RX ADMIN — SUCRALFATE 1 G: 1 SUSPENSION ORAL at 00:01

## 2018-01-10 RX ADMIN — ZOLPIDEM TARTRATE 5 MG: 5 TABLET ORAL at 00:01

## 2018-01-10 RX ADMIN — HYDROCODONE BITARTRATE AND ACETAMINOPHEN 1 TABLET: 10; 325 TABLET ORAL at 09:06

## 2018-01-10 RX ADMIN — CLOPIDOGREL 75 MG: 75 TABLET, FILM COATED ORAL at 09:06

## 2018-01-10 RX ADMIN — SODIUM CHLORIDE 8 MG/HR: 900 INJECTION INTRAVENOUS at 04:12

## 2018-01-10 RX ADMIN — HYDROCODONE BITARTRATE AND ACETAMINOPHEN 1 TABLET: 10; 325 TABLET ORAL at 14:58

## 2018-01-10 RX ADMIN — HYDROCODONE BITARTRATE AND ACETAMINOPHEN 1 TABLET: 10; 325 TABLET ORAL at 03:42

## 2018-01-10 RX ADMIN — SUCRALFATE 1 G: 1 SUSPENSION ORAL at 06:23

## 2018-01-10 RX ADMIN — ASPIRIN 81 MG: 81 TABLET ORAL at 09:06

## 2018-01-10 RX ADMIN — CARVEDILOL 6.25 MG: 6.25 TABLET, FILM COATED ORAL at 09:06

## 2018-01-10 NOTE — PROGRESS NOTES
LOS: 3 days   Patient Care Team:  PAULA Puckett as PCP - General (Family Medicine)    Chief Complaint: Recent STEMI, GI bleed       Interval History: He had a STEMI with VF arrest and cardiogenic shock last month.  Now with acute GI bleeding, presumbly from ulcer (treated), alcohol use.    His Hb is down, but his stool does not look bloody.      Objective   Vital Signs  Temp:  [97.5 °F (36.4 °C)-98.6 °F (37 °C)] 98 °F (36.7 °C)  Heart Rate:  [69-91] 87  Resp:  [18-20] 18  BP: (111-146)/() 146/112    Intake/Output Summary (Last 24 hours) at 01/09/18 1935  Last data filed at 01/09/18 1922   Gross per 24 hour   Intake             1100 ml   Output             2730 ml   Net            -1630 ml       Comfortable NAD  PERRL, conjunctiva clear  Neck supple, no JVD or thyromegaly appreciated  S1/S2 RRR, no m/r/g  Lungs CTA B, normal effort  Abdomen S/NT/ND (+) BS, no HSM appreciated  Extremities warm, no clubbing, cyanosis, or edema  Normal gait  No visible or palpable skin lesions  A/Ox4, mood and affect appropriate    Results Review:        Results from last 7 days  Lab Units 01/09/18  0450 01/08/18  0808 01/06/18  1939   SODIUM mmol/L 142 141 141   POTASSIUM mmol/L 3.5 4.0 4.0   CHLORIDE mmol/L 105 106 106   CO2 mmol/L 26.1 22.6 20.1*   BUN mg/dL 10 10 45*   CREATININE mg/dL 0.82 0.79 1.13   GLUCOSE mg/dL 97 124* 105*   CALCIUM mg/dL 8.8 9.0 8.3*       Results from last 7 days  Lab Units 01/07/18  1751 01/07/18  0832 01/07/18  0023   TROPONIN T ng/mL <0.010 <0.010 <0.010       Results from last 7 days  Lab Units 01/09/18  1811 01/09/18  0450 01/08/18  1606  01/06/18  1845   WBC 10*3/mm3 8.36 6.27  --   --  9.34   HEMOGLOBIN g/dL 8.5* 7.5* 7.9*  < > 11.4*   HEMATOCRIT % 26.8* 23.7* 24.4*  < > 35.5*   PLATELETS 10*3/mm3 192 161  --   --  247   < > = values in this interval not displayed.                    I reviewed the patient's new clinical results.  I personally viewed and interpreted the patient's  EKG/Telemetry data        Medication Review:     aspirin 81 mg Oral Daily   atorvastatin 20 mg Oral Daily   carvedilol 6.25 mg Oral BID With Meals   clopidogrel 75 mg Oral Daily   sucralfate 1 g Oral Q6H         pantoprazole 8 mg/hr Last Rate: 8 mg/hr (01/09/18 5337)       Assessment/Plan     Active Problems:    Upper GI bleed    Recent STEMI complicated by VF arrest and shock.  Back on ASA/clopidogrel.    We can always support Hb with blood products if necessary.    Nick Sahu MD  01/09/18  7:35 PM

## 2018-01-10 NOTE — DISCHARGE SUMMARY
PHYSICIAN DISCHARGE SUMMARY                                                                          [facility]    Patient Identification:  Name: Ruddy Griffin  Age: 42 y.o.  Sex: male  :  1975  MRN: [id number]  Primary Care Physician: PAULA Puckett    Admit date: 2018  Discharge date and time:1/10/2018  Discharged Condition: good    Discharge Diagnoses:Active Problems:    Upper GI bleed       Hospital Course: Ruddy Griffin presented to [facility]    Patient is a 42-year-old who presented to the emergency room with melena.  Patient has status post PCI and on Plavix.  Rx was held for 2 days while bleeding occurred.  Patient underwent EGD with clipping and injections with cessation of bleeding.  Prior to his discharge his hemoglobin have been stable and he was started on an treated with proton pump inhibitors.  He'll continue on these twice a day and follow-up with GI.  Hemoglobin has been stable for 48 hours and patient is having brown stools.  He has no chest pain  Consults:   IP CONSULT TO PULMONOLOGY  IP CONSULT TO GASTROENTEROLOGY  IP CONSULT TO CARDIOLOGY    Significant Diagnostic Studies:    Results from last 7 days  Lab Units 01/10/18  0652 18  1811 18  0450 18  1606 18  0808 18  0009 18  1751  18  1845   WBC 10*3/mm3 6.63 8.36 6.27  --   --   --   --   --  9.34   HEMOGLOBIN g/dL 8.1* 8.5* 7.5* 7.9* 8.1* 8.7* 8.9*  < > 11.4*   PLATELETS 10*3/mm3 180 192 161  --   --   --   --   --  247   < > = values in this interval not displayed.  Results from last 7 days  Lab Units 01/10/18  0652 18  0450 18  0808 18  1939   SODIUM mmol/L 142 142 141 141   POTASSIUM mmol/L 3.7 3.5 4.0 4.0   CHLORIDE mmol/L 103 105 106 106   CO2 mmol/L 27.8 26.1 22.6 20.1*   BUN mg/dL 7 10 10 45*   CREATININE mg/dL 0.90 0.82 0.79 1.13   GLUCOSE mg/dL 97 97 124* 105*   CALCIUM  mg/dL 8.8 8.8 9.0 8.3*   PHOSPHORUS mg/dL 3.8 3.1  --   --    Estimated Creatinine Clearance: 127.7 mL/min (by C-G formula based on Cr of 0.9).  Discharge Exam:  Temp:  [98 °F (36.7 °C)-99 °F (37.2 °C)] 98.1 °F (36.7 °C)  Heart Rate:  [75-88] 88  Resp:  [20] 20  BP: (107-146)/() 107/69  GENERAL:  NAD, Aox3  HEENT:  EOMI, nonicteric sclera, no JVD  PULMONARY:    CTAB, no wheeze, no crackles, rhonchi, symmetric air entry  CARDIAC:  RRR no MRG, S1 S2  ABD: SNTND BS+  EXT: no c/c/e, pulses symmetric 2+ bilaterally  NEURO:  CNs II-XII intact, no focal deficits     Disposition:  Home    Patient Instructions:      Your medication list      START taking these medications       Instructions Last Dose Given Next Dose Due    pantoprazole 40 MG EC tablet   Commonly known as:  PROTONIX        Take 1 tablet by mouth Daily.           CONTINUE taking these medications       Instructions Last Dose Given Next Dose Due    aspirin 81 MG chewable tablet        Chew 1 tablet Daily.         atorvastatin 20 MG tablet   Commonly known as:  LIPITOR        Take 1 tablet by mouth Daily.         carvedilol 6.25 MG tablet   Commonly known as:  COREG        Take 1 tablet by mouth 2 (two) times a day with meals.         clopidogrel 75 MG tablet   Commonly known as:  PLAVIX        Take 1 tablet by mouth Daily.         cyclobenzaprine 10 MG tablet   Commonly known as:  FLEXERIL        Take 1 tablet by mouth 3 (three) times a day as needed for muscle spasms.         HYDROcodone-acetaminophen 7.5-325 MG per tablet   Commonly known as:  NORCO        Take 1 tablet by mouth every 6 (six) hours as needed for moderate pain (4-6).              Where to Get Your Medications      These medications were sent to [facility] [address] [place] [address] - [address] AT [facility] [address] - [phone]  - [phone]   [address], [facility] [address]    Hours:  24-hours Phone:  [phone]    • pantoprazole 40 MG  EC tablet             Follow-up Information     Follow up with Camilla Reeves MD .    Specialty:  Gastroenterology    Why:  Doctors office will contact you for follow up appointment. Need repeat EGD in about 3 months.     Contact information:    [address]  [place] [id number] [phone]             Medication Reconciliation: Please see electronically completed Med Rec.    Total time spent discharging patient including evaluation, medication reconciliation, arranging follow up, and post hospitalization instructions and education total time exceeds 30 minutes.    Signed:  Kb Gann MD  1/10/2018  3:01 PM

## 2018-01-10 NOTE — DISCHARGE INSTR - DIET
Reduce intake of spicy foods, or acidic foods as they can be damaging to the lining of your stomach.

## 2018-01-10 NOTE — PROGRESS NOTES
LOS: 4 days   Patient Care Team:  PAULA Puckett as PCP - General (Family Medicine)    Chief Complaint: Recent STEMI, GI bleed       Interval History: He had a STEMI with VF arrest and cardiogenic shock last month.  Now with acute GI bleeding, presumbly from ulcer (treated), alcohol use.    His Hb is back up.      Objective   Vital Signs  Temp:  [98 °F (36.7 °C)-99 °F (37.2 °C)] 98.1 °F (36.7 °C)  Heart Rate:  [75-88] 88  Resp:  [18-20] 20  BP: (107-146)/() 107/69    Intake/Output Summary (Last 24 hours) at 01/10/18 1027  Last data filed at 01/10/18 0900   Gross per 24 hour   Intake             1640 ml   Output             3555 ml   Net            -1915 ml       Comfortable NAD  PERRL, conjunctiva clear  Neck supple, no JVD or thyromegaly appreciated  S1/S2 RRR, no m/r/g  Lungs CTA B, normal effort  Abdomen S/NT/ND (+) BS, no HSM appreciated  Extremities warm, no clubbing, cyanosis, or edema  Normal gait  No visible or palpable skin lesions  A/Ox4, mood and affect appropriate    Results Review:        Results from last 7 days  Lab Units 01/10/18  0652 01/09/18  0450 01/08/18  0808   SODIUM mmol/L 142 142 141   POTASSIUM mmol/L 3.7 3.5 4.0   CHLORIDE mmol/L 103 105 106   CO2 mmol/L 27.8 26.1 22.6   BUN mg/dL 7 10 10   CREATININE mg/dL 0.90 0.82 0.79   GLUCOSE mg/dL 97 97 124*   CALCIUM mg/dL 8.8 8.8 9.0       Results from last 7 days  Lab Units 01/07/18  1751 01/07/18  0832 01/07/18  0023   TROPONIN T ng/mL <0.010 <0.010 <0.010       Results from last 7 days  Lab Units 01/10/18  0652 01/09/18  1811 01/09/18  0450   WBC 10*3/mm3 6.63 8.36 6.27   HEMOGLOBIN g/dL 8.1* 8.5* 7.5*   HEMATOCRIT % 25.7* 26.8* 23.7*   PLATELETS 10*3/mm3 180 192 161                       I reviewed the patient's new clinical results.  I personally viewed and interpreted the patient's EKG/Telemetry data        Medication Review:     aspirin 81 mg Oral Daily   atorvastatin 20 mg Oral Nightly   carvedilol 6.25 mg Oral BID With Meals    clopidogrel 75 mg Oral Daily   sucralfate 1 g Oral Q6H            Assessment/Plan     Active Problems:    Upper GI bleed    Recent STEMI complicated by VF arrest and shock.  Back on ASA/clopidogrel.    Hb stable.  OK to be discharged from my standpoint.    Nick Sahu MD  01/10/18  10:27 AM

## 2018-01-10 NOTE — PLAN OF CARE
Problem: Skin Integrity Impairment, Risk/Actual (Adult)  Goal: Skin Integrity/Wound Healing  Outcome: Ongoing (interventions implemented as appropriate)      Problem: Gastrointestinal Bleeding (Adult)  Goal: Signs and Symptoms of Listed Potential Problems Will be Absent or Manageable (Gastrointestinal Bleeding)  Outcome: Ongoing (interventions implemented as appropriate)      Problem: Patient Care Overview (Adult)  Goal: Plan of Care Review  Outcome: Ongoing (interventions implemented as appropriate)   01/10/18 0502   Coping/Psychosocial Response Interventions   Plan Of Care Reviewed With patient   Patient Care Overview   Progress improving   Outcome Evaluation   Outcome Summary/Follow up Plan no falls; pt refusing bed alarm but encouraged to call for assistance; vital signs stable; await AM H&H; will continue to monitor       Problem: Pain, Acute (Adult)  Goal: Acceptable Pain Control/Comfort Level  Outcome: Ongoing (interventions implemented as appropriate)

## 2018-01-10 NOTE — PROGRESS NOTES
[facility]  Inpatient Progress Note    Reason for Follow Up:  Upper gi bleed    Subjective     Interval History:   Hb improved.  No pain, no further BM since yesterday.  Anxious for home    Current Facility-Administered Medications:   •  aspirin EC tablet 81 mg, 81 mg, Oral, Daily, Nick Sahu MD, 81 mg at 01/10/18 0906  •  atorvastatin (LIPITOR) tablet 20 mg, 20 mg, Oral, Nightly, Raul Gann MD  •  carvedilol (COREG) tablet 6.25 mg, 6.25 mg, Oral, BID With Meals, Nick Sahu MD, 6.25 mg at 01/10/18 0906  •  clopidogrel (PLAVIX) tablet 75 mg, 75 mg, Oral, Daily, Nick Sahu MD, 75 mg at 01/10/18 0906  •  HYDROcodone-acetaminophen (NORCO)  MG per tablet 1 tablet, 1 tablet, Oral, Q4H PRN, Raul Gann MD, 1 tablet at 01/10/18 0906  •  morphine injection 2 mg, 2 mg, Intravenous, Q4H PRN, Raul Gann MD  •  ondansetron (ZOFRAN) injection 4 mg, 4 mg, Intravenous, Q6H PRN, Raul Gann MD, 4 mg at 01/09/18 1814  •  sodium chloride 0.9 % flush 10 mL, 10 mL, Intravenous, PRN, Neil Osorio MD  •  sucralfate (CARAFATE) 1 GM/10ML suspension 1 g, 1 g, Oral, Q6H, Francesca Pascual MD, 1 g at 01/10/18 0623  •  zolpidem (AMBIEN) tablet 5 mg, 5 mg, Oral, Nightly PRN, Raul Gann MD, 5 mg at 01/10/18 0001  Review of Systems:    The following systems were reviewed and negative;  constitution, respiratory, cardiovascular and gastrointestinal    Objective     Vital Signs  Temp:  [98 °F (36.7 °C)-99 °F (37.2 °C)] 98.1 °F (36.7 °C)  Heart Rate:  [75-88] 88  Resp:  [18-20] 20  BP: (107-146)/() 107/69  Body mass index is 33.53 kg/(m^2).    Intake/Output Summary (Last 24 hours) at 01/10/18 0925  Last data filed at 01/10/18 0900   Gross per 24 hour   Intake             1640 ml   Output             3555 ml   Net            -1915 ml     I/O this shift:  In: 440 [P.O.:440]  Out: 400 [Urine:400]     Physical Exam:   General: patient awake, alert and cooperative   Eyes: Normal lids and lashes,  no scleral icterus   Neck: supple, normal ROM   Skin: warm and dry, not jaundiced   Cardiovascular: regular rhythm and rate, no murmurs auscultated   Pulm: clear to auscultation bilaterally, regular and unlabored   Abdomen: soft, nontender, nondistended; normal bowel sounds   Rectal: deferred   Extremities: no rash or edema   Psychiatric: Normal mood and behavior; memory intact     Results Review:     I reviewed the patient's new clinical results.      Results from last 7 days  Lab Units 01/10/18  0652 01/09/18  1811 01/09/18  0450   WBC 10*3/mm3 6.63 8.36 6.27   HEMOGLOBIN g/dL 8.1* 8.5* 7.5*   HEMATOCRIT % 25.7* 26.8* 23.7*   PLATELETS 10*3/mm3 180 192 161       Results from last 7 days  Lab Units 01/10/18  0652 01/09/18  0450 01/08/18  0808 01/06/18  1939   SODIUM mmol/L 142 142 141 141   POTASSIUM mmol/L 3.7 3.5 4.0 4.0   CHLORIDE mmol/L 103 105 106 106   CO2 mmol/L 27.8 26.1 22.6 20.1*   BUN mg/dL 7 10 10 45*   CREATININE mg/dL 0.90 0.82 0.79 1.13   CALCIUM mg/dL 8.8 8.8 9.0 8.3*   BILIRUBIN mg/dL  --   --   --  0.4   ALK PHOS U/L  --   --   --  53   ALT (SGPT) U/L  --   --   --  52*   AST (SGOT) U/L  --   --   --  18   GLUCOSE mg/dL 97 97 124* 105*           Lab Results  Lab Value Date/Time   LIPASE 33 12/03/2014 1645       Radiology:  XR Chest 1 View   Final Result          Assessment/Plan     Patient Active Problem List   Diagnosis   • ST elevation myocardial infarction (STEMI)   • Coronary artery disease involving native coronary artery of native heart without angina pectoris   • Essential hypertension   • Hyperlipidemia   • Upper GI bleed       Impression  1. Upper GI bleed: Resolved, due to fundus ulcer, clipped x 1.  2. Blood loss anemia: Hb improved, stable  3. STEMI with ALUREN: from December, back on asa and plavix     Plan  -pantoprazole BID x 3 months then daily  -no additional NSAIDs  -repeat EGD in 3 months to assess healing of ulcer-- my office will arrange follow up  -he is aware to return to the  ER for any further bleeding episdoes  -OK for discharge from GI standpoint  -advance diet as tolerated    I discussed the patients findings and my recommendations with patient and nursing staff.    Camilla Reeves MD

## 2018-01-10 NOTE — PROGRESS NOTES
Case Management Discharge Note    Final Note: Pt DC'd home    Discharge Placement     No information found             Discharge Codes: 01  Discharge to home

## 2018-01-10 NOTE — NURSING NOTE
Patient became 'dizzy' and nauseated. Checked blood pressure- elevated and blood sugar WNL. Dr. VERONIQUE Gann at the bedside, ordered zofran prn.

## 2018-01-10 NOTE — PROGRESS NOTES
Continued Stay Note  [facility]     Patient Name: Ruddy Grififn  MRN: [id number]  Today's Date: 1/10/2018    Admit Date: 1/6/2018          Discharge Plan       01/10/18 1333    Case Management/Social Work Plan    Plan Plan is to return home upon DC.  CCP will continue to follow.    Additional Comments Spoke with pt at bedside.  He confirms he plans to return home upon DC and hopes to be DC'd today.  Denies any DC needs.  Discussed making hospital followup appt with his PCP PAULA Puckett.  He states that he will be following up with cardiology and gastroenterology soon.  He prefers to wait until he gets home to make the followup appt with Alexa Romero so he can check his schedule first.         Christina Rosa RN

## 2018-01-22 ENCOUNTER — OFFICE VISIT (OUTPATIENT)
Dept: FAMILY MEDICINE CLINIC | Facility: CLINIC | Age: 43
End: 2018-01-22

## 2018-01-22 VITALS
DIASTOLIC BLOOD PRESSURE: 86 MMHG | OXYGEN SATURATION: 98 % | WEIGHT: 222.8 LBS | HEIGHT: 69 IN | TEMPERATURE: 99 F | BODY MASS INDEX: 33 KG/M2 | HEART RATE: 104 BPM | SYSTOLIC BLOOD PRESSURE: 134 MMHG

## 2018-01-22 DIAGNOSIS — I10 ESSENTIAL HYPERTENSION: Primary | ICD-10-CM

## 2018-01-22 DIAGNOSIS — M51.36 LUMBAR DEGENERATIVE DISC DISEASE: ICD-10-CM

## 2018-01-22 DIAGNOSIS — I25.10 CORONARY ARTERY DISEASE INVOLVING NATIVE CORONARY ARTERY OF NATIVE HEART WITHOUT ANGINA PECTORIS: ICD-10-CM

## 2018-01-22 DIAGNOSIS — K25.0 ACUTE GASTRIC ULCER WITH HEMORRHAGE: ICD-10-CM

## 2018-01-22 DIAGNOSIS — I25.2 HISTORY OF MI (MYOCARDIAL INFARCTION): ICD-10-CM

## 2018-01-22 DIAGNOSIS — K92.2 UPPER GI BLEED: ICD-10-CM

## 2018-01-22 DIAGNOSIS — D64.9 ANEMIA, UNSPECIFIED TYPE: ICD-10-CM

## 2018-01-22 DIAGNOSIS — M47.816 LUMBAR SPONDYLOSIS: ICD-10-CM

## 2018-01-22 DIAGNOSIS — E78.5 HYPERLIPIDEMIA, UNSPECIFIED HYPERLIPIDEMIA TYPE: ICD-10-CM

## 2018-01-22 PROBLEM — M51.369 LUMBAR DEGENERATIVE DISC DISEASE: Status: ACTIVE | Noted: 2018-01-22

## 2018-01-22 PROCEDURE — 99204 OFFICE O/P NEW MOD 45 MIN: CPT | Performed by: PHYSICIAN ASSISTANT

## 2018-01-22 NOTE — PROGRESS NOTES
"Subjective   Ruddy Griffin is a 42 y.o. male seen today to establish care - RECENT HOSPITALIZATION FOR MI THEN GI BLEED. NEEDS REFERRALS FOR SPECIALIST FOLLOW UP.     History of Present Illness     PREVIOUS PCP- DR LUCAS. HAS NOT BEEN SEEN THERE SINCE ON PASSPORT DUE TO THEM NOT ACCEPTING PASSPORT. NO PCP IN 7 YEARS. THEY WERE WORKING WITH BACK ISSUES. REPORTS WITHOUT HAVING ANYTHING FOR PAIN. 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 HE HAS LYMPHEDEMA, CAD WITH MI IN 1998, DM, PROSTATE CA BUT CAN'T GIVE CHEMO BECAUSE OF HIS WEIGHT. TO DO BONE SCAN, HAS TO BE < 400 LBS. WALKS WITH WALKER. HAD BILATERAL KNEE REPLACEMENTS.     PREVIOUSLY SEEN BY SPINE SPECIALIST WITH [facility]. LAST SEEN 1 1/2 YEARS AGO. HAD INJECTIONS- REPORTS \"THEY USUALLY LAST ABOUT 3 WEEKS. STATES SPINE SPECIALIST RECOMMENDED PHYSICAL THERAPY - HE REPORTS \"I'VE DONE PHYSICAL THERAPY BEFORE AND IT DOESN'T WORK\".     STOPPED SMOKING AND DRINKING ETOH. PREVIOUSLY DRINKING ABOUT 8 BEERS PER DAY. REPORTS STOPPED HAVING HEADACHES RECENTLY- THINKS COULD BE RELATED TO WITHDRAWAL FROM ETOH. STARTED SMOKING CIGARETTES AT AGE 15- WAS SMOKING ABOUT 1 PPD. STOPPED SMOKING WITH MI.     WAS TOLD BORDERLINE DM IN HOSPITAL.   12/2017- FELT SHORTNESS OF BREATH AND BP WAS A LITTLE ELEVATED BUT SUDDEN ONSET PAIN IN CHEST. THOUGHT WAS INDIGESTION. THE NEXT DAY, WENT TO ANOTHER STI Technologies PARTY, WAS MUCH MORE INTENSE. GOT PRE-SYNCOPAL AND CALLED 9-1-1 AND WAS TAKEN TO ED. EKG WITH EMS AND IN ED ST ELEVATION. HAD EPISODE OF VF IN ED - RESOLVED WITH SINGLE SHOCK. DURING INTERVENTION, HAD CARDIOGENIC SHOCK WITH BRADYCARDIA. RESPONDED TO AMIO AND NTG. OTHERWISE, HOSPITAL COURSE UNREMARKABLE. ON PLAVIX, ASA, LIPITOR, COREG.     1/6- THEN HAD TO GO BACK TO ER FOR BLEEDING- PASSED OUT IN HALLWAY AND DEFECATED ON SELF. HAD NOTICED DARK STOOL. STOPPED PLAVIX BRIEFLY. WAS TAKEN VIA AMBULANCE TO ER. GASTRIC ULCER WITH BLEEDING. CLIPPING PERFORMED. FOLLOW UP 2/26/18. NO FURTHER BLOOD IN " STOOL. HAS STABILIZED. BACK ON PLAVIX AND ASA.     The following portions of the patient's history were reviewed and updated as appropriate: allergies, current medications, past family history, past medical history, past social history, past surgical history and problem list.    Review of Systems   All other systems reviewed and are negative.      Objective   Physical Exam   Constitutional: He is oriented to person, place, and time. He appears well-developed.   HENT:   Head: Normocephalic and atraumatic.   Right Ear: External ear normal.   Left Ear: External ear normal.   Eyes: Conjunctivae are normal.   Neck: Carotid bruit is not present. No tracheal deviation present. No thyroid mass and no thyromegaly present.   Cardiovascular: Normal rate, regular rhythm, normal heart sounds and intact distal pulses.    Pulmonary/Chest: Effort normal and breath sounds normal.   Musculoskeletal:   PATIENT CHANGED POSITIONS IN ROOM, SITTING TO STANDING DUE TO REPORTED PAIN.   Neurological: He is alert and oriented to person, place, and time. Gait normal.   Skin: Skin is warm and dry.   Psychiatric: He has a normal mood and affect. His behavior is normal. Judgment and thought content normal.   Nursing note and vitals reviewed.      Assessment/Plan   Ruddy was seen today for establish care.    Diagnoses and all orders for this visit:    Essential hypertension  -     Ambulatory Referral to Cardiology  -     Ambulatory Referral to Gastroenterology  -     CBC & Differential  -     Comprehensive Metabolic Panel  -     CK  -     Iron and TIBC  -     Ferritin  -     Vitamin B12 & Folate  -     Vitamin D 25 Hydroxy    Coronary artery disease involving native coronary artery of native heart without angina pectoris  -     Ambulatory Referral to Cardiology  -     Ambulatory Referral to Gastroenterology  -     CBC & Differential  -     Comprehensive Metabolic Panel  -     CK  -     Iron and TIBC  -     Ferritin  -     Vitamin B12 & Folate  -      Vitamin D 25 Hydroxy    History of MI (myocardial infarction)  -     Ambulatory Referral to Cardiology  -     Ambulatory Referral to Gastroenterology  -     CBC & Differential  -     Comprehensive Metabolic Panel  -     CK  -     Iron and TIBC  -     Ferritin  -     Vitamin B12 & Folate  -     Vitamin D 25 Hydroxy    Hyperlipidemia, unspecified hyperlipidemia type  -     Ambulatory Referral to Cardiology  -     Ambulatory Referral to Gastroenterology  -     CBC & Differential  -     Comprehensive Metabolic Panel  -     CK  -     Iron and TIBC  -     Ferritin  -     Vitamin B12 & Folate  -     Vitamin D 25 Hydroxy    Upper GI bleed  -     Ambulatory Referral to Cardiology  -     Ambulatory Referral to Gastroenterology  -     CBC & Differential  -     Comprehensive Metabolic Panel  -     CK  -     Iron and TIBC  -     Ferritin  -     Vitamin B12 & Folate  -     Vitamin D 25 Hydroxy    Acute gastric ulcer with hemorrhage  -     Ambulatory Referral to Cardiology  -     Ambulatory Referral to Gastroenterology  -     CBC & Differential  -     Comprehensive Metabolic Panel  -     CK  -     Iron and TIBC  -     Ferritin  -     Vitamin B12 & Folate  -     Vitamin D 25 Hydroxy    Lumbar degenerative disc disease  -     Ambulatory Referral to Neurosurgery  -     CBC & Differential  -     Comprehensive Metabolic Panel  -     CK  -     Iron and TIBC  -     Ferritin  -     Vitamin B12 & Folate  -     Vitamin D 25 Hydroxy    Lumbar spondylosis  -     Ambulatory Referral to Neurosurgery  -     CBC & Differential  -     Comprehensive Metabolic Panel  -     CK  -     Iron and TIBC  -     Ferritin  -     Vitamin B12 & Folate  -     Vitamin D 25 Hydroxy    Anemia, unspecified type  -     Ambulatory Referral to Cardiology  -     Ambulatory Referral to Gastroenterology  -     CBC & Differential  -     Comprehensive Metabolic Panel  -     CK  -     Iron and TIBC  -     Ferritin  -     Vitamin B12 & Folate  -     Vitamin D 25  Hydroxy      Patient Instructions   42 YEAR OLD MALE WHO PRESENTS TODAY AS A NEW PATIENT. HE HAS PMH SIGNIFICANT FOR LUMBAR DDD AND SPONDYLOSIS, AND PROBABLE HTN AND HYPERLIPIDEMIA. PATIENT WENT TO ED 12/2017 WITH MI, EPISODE OF VF THAT RESOLVED WITH SINGLE SHOCK. HAD CATH AND STENT WITH CARDIOGENIC SHOCK AND BRADYCARDIA WITH PROCEDURE. HE WAS STABLE THROUGHOUT REMAINDER OF HOSPITALIZATION. PATIENT WAS D/C ON PLAVIX, ASA, LIPITOR, AND COREG. HE THEN WAS TAKEN BACK TO ER AFTER SYNCOPE WITH UPPER GI BLEED WITH GASTRIC ULCER- EGD WITH CLIPPING PERFORMED AND PLAVIX STOPPED ONLY BRIEFLY. PATIENT STABILIZED AND PLAVIX WAS RESTARTED. HE HAS BEEN WELL SINCE D/C 1/10/18. PATIENT REPORTS HISTORY OF TOBACCO AND ETOH USE- HE STOPPED ETOH AND SMOKING 12/2017 WITH HOSPITALIZATION. I WILL PLACE REFERRAL FOR CARDIOLOGY AND GASTROENTEROLOGY FOR FOLLOW UP FROM HOSPITALIZATION. HE HAS APPTS FOR FOLLOW UP.     PATIENT TO CHECK LABS TODAY WITH RECENT GI BLEED, ASA AND PLAVIX, ANEMIA, AND STARTING NEW STATIN. HE SHOULD CALL IF NO RESULTS IN 1 WEEK. FOLLOW UP PENDING LABS. FOLLOW UP NO MORE THAN 3 MONTHS WITH ME. TO BE SEEN HERE OR ER ASAP IF RECURRENCE OF SYMPTOMS OR NEW SYMPTOMS. PATIENT WILL NEED FOLLOW UP FOR LUMBAR SPINE ISSUES- HE WILL SIGN A RELEASE FROM PREVIOUS PCP AND SPINE SPECIALIST AND I WILL REFER.

## 2018-01-23 LAB
25(OH)D3+25(OH)D2 SERPL-MCNC: 24.6 NG/ML (ref 30–100)
ALBUMIN SERPL-MCNC: 4.5 G/DL (ref 3.5–5.5)
ALBUMIN/GLOB SERPL: 1.7 {RATIO} (ref 1.2–2.2)
ALP SERPL-CCNC: 94 IU/L (ref 39–117)
ALT SERPL-CCNC: 40 IU/L (ref 0–44)
AST SERPL-CCNC: 20 IU/L (ref 0–40)
BASOPHILS # BLD AUTO: 0 X10E3/UL (ref 0–0.2)
BASOPHILS NFR BLD AUTO: 0 %
BILIRUB SERPL-MCNC: 0.3 MG/DL (ref 0–1.2)
BUN SERPL-MCNC: 13 MG/DL (ref 6–24)
BUN/CREAT SERPL: 16 (ref 9–20)
CALCIUM SERPL-MCNC: 9.7 MG/DL (ref 8.7–10.2)
CHLORIDE SERPL-SCNC: 101 MMOL/L (ref 96–106)
CK SERPL-CCNC: 59 U/L (ref 24–204)
CO2 SERPL-SCNC: 24 MMOL/L (ref 18–29)
CREAT SERPL-MCNC: 0.8 MG/DL (ref 0.76–1.27)
EOSINOPHIL # BLD AUTO: 0.1 X10E3/UL (ref 0–0.4)
EOSINOPHIL NFR BLD AUTO: 1 %
ERYTHROCYTE [DISTWIDTH] IN BLOOD BY AUTOMATED COUNT: 16.4 % (ref 12.3–15.4)
FERRITIN SERPL-MCNC: 60 NG/ML (ref 30–400)
FOLATE SERPL-MCNC: 16.4 NG/ML
GLOBULIN SER CALC-MCNC: 2.7 G/DL (ref 1.5–4.5)
GLUCOSE SERPL-MCNC: 94 MG/DL (ref 65–99)
HCT VFR BLD AUTO: 28.7 % (ref 37.5–51)
HGB BLD-MCNC: 9.3 G/DL (ref 13–17.7)
IMM GRANULOCYTES # BLD: 0 X10E3/UL (ref 0–0.1)
IMM GRANULOCYTES NFR BLD: 0 %
IRON SATN MFR SERPL: 7 % (ref 15–55)
IRON SERPL-MCNC: 23 UG/DL (ref 38–169)
LYMPHOCYTES # BLD AUTO: 1.5 X10E3/UL (ref 0.7–3.1)
LYMPHOCYTES NFR BLD AUTO: 21 %
MCH RBC QN AUTO: 27.3 PG (ref 26.6–33)
MCHC RBC AUTO-ENTMCNC: 32.4 G/DL (ref 31.5–35.7)
MCV RBC AUTO: 84 FL (ref 79–97)
MONOCYTES # BLD AUTO: 0.8 X10E3/UL (ref 0.1–0.9)
MONOCYTES NFR BLD AUTO: 11 %
NEUTROPHILS # BLD AUTO: 4.6 X10E3/UL (ref 1.4–7)
NEUTROPHILS NFR BLD AUTO: 67 %
PLATELET # BLD AUTO: 412 X10E3/UL (ref 150–379)
POTASSIUM SERPL-SCNC: 4.9 MMOL/L (ref 3.5–5.2)
PROT SERPL-MCNC: 7.2 G/DL (ref 6–8.5)
RBC # BLD AUTO: 3.41 X10E6/UL (ref 4.14–5.8)
SODIUM SERPL-SCNC: 143 MMOL/L (ref 134–144)
TIBC SERPL-MCNC: 348 UG/DL (ref 250–450)
UIBC SERPL-MCNC: 325 UG/DL (ref 111–343)
VIT B12 SERPL-MCNC: 455 PG/ML (ref 232–1245)
WBC # BLD AUTO: 7 X10E3/UL (ref 3.4–10.8)

## 2018-01-24 ENCOUNTER — TELEPHONE (OUTPATIENT)
Dept: CARDIOLOGY | Facility: CLINIC | Age: 43
End: 2018-01-24

## 2018-01-24 NOTE — TELEPHONE ENCOUNTER
----- Message from Ruddy Griffin sent at 1/22/2018  7:12 PM EST -----  Regarding: Test Results Question  Contact: [phone]  Please review my EKG results from different dates. I’m concerned about the differences visual results. Thank you

## 2018-01-29 NOTE — PATIENT INSTRUCTIONS
42 YEAR OLD MALE WHO PRESENTS TODAY AS A NEW PATIENT. HE HAS PMH SIGNIFICANT FOR LUMBAR DDD AND SPONDYLOSIS, AND PROBABLE HTN AND HYPERLIPIDEMIA. PATIENT WENT TO ED 12/2017 WITH MI, EPISODE OF VF THAT RESOLVED WITH SINGLE SHOCK. HAD CATH AND STENT WITH CARDIOGENIC SHOCK AND BRADYCARDIA WITH PROCEDURE. HE WAS STABLE THROUGHOUT REMAINDER OF HOSPITALIZATION. PATIENT WAS D/C ON PLAVIX, ASA, LIPITOR, AND COREG. HE THEN WAS TAKEN BACK TO ER AFTER SYNCOPE WITH UPPER GI BLEED WITH GASTRIC ULCER- EGD WITH CLIPPING PERFORMED AND PLAVIX STOPPED ONLY BRIEFLY. PATIENT STABILIZED AND PLAVIX WAS RESTARTED. HE HAS BEEN WELL SINCE D/C 1/10/18. PATIENT REPORTS HISTORY OF TOBACCO AND ETOH USE- HE STOPPED ETOH AND SMOKING 12/2017 WITH HOSPITALIZATION. I WILL PLACE REFERRAL FOR CARDIOLOGY AND GASTROENTEROLOGY FOR FOLLOW UP FROM HOSPITALIZATION. HE HAS APPTS FOR FOLLOW UP.     PATIENT TO CHECK LABS TODAY WITH RECENT GI BLEED, ASA AND PLAVIX, ANEMIA, AND STARTING NEW STATIN. HE SHOULD CALL IF NO RESULTS IN 1 WEEK. FOLLOW UP PENDING LABS. FOLLOW UP NO MORE THAN 3 MONTHS WITH ME. TO BE SEEN HERE OR ER ASAP IF RECURRENCE OF SYMPTOMS OR NEW SYMPTOMS. PATIENT WILL NEED FOLLOW UP FOR LUMBAR SPINE ISSUES- HE WILL SIGN A RELEASE FROM PREVIOUS PCP AND SPINE SPECIALIST AND I WILL REFER.

## 2018-01-30 ENCOUNTER — TRANSCRIBE ORDERS (OUTPATIENT)
Dept: CARDIAC REHAB | Facility: HOSPITAL | Age: 43
End: 2018-01-30

## 2018-01-30 DIAGNOSIS — Z95.5 STATUS POST INSERTION OF DRUG ELUTING CORONARY ARTERY STENT: ICD-10-CM

## 2018-01-30 DIAGNOSIS — I21.19 ACUTE MI, INFERIOR WALL (HCC): Primary | ICD-10-CM

## 2018-01-31 ENCOUNTER — OFFICE VISIT (OUTPATIENT)
Dept: CARDIOLOGY | Facility: CLINIC | Age: 43
End: 2018-01-31

## 2018-01-31 VITALS
HEART RATE: 70 BPM | WEIGHT: 225 LBS | SYSTOLIC BLOOD PRESSURE: 156 MMHG | HEIGHT: 69 IN | BODY MASS INDEX: 33.33 KG/M2 | DIASTOLIC BLOOD PRESSURE: 90 MMHG

## 2018-01-31 DIAGNOSIS — I21.11 ST ELEVATION MYOCARDIAL INFARCTION INVOLVING RIGHT CORONARY ARTERY (HCC): Primary | ICD-10-CM

## 2018-01-31 DIAGNOSIS — I25.10 CORONARY ARTERY DISEASE INVOLVING NATIVE CORONARY ARTERY OF NATIVE HEART WITHOUT ANGINA PECTORIS: ICD-10-CM

## 2018-01-31 DIAGNOSIS — K92.2 UPPER GI BLEED: ICD-10-CM

## 2018-01-31 PROCEDURE — 99213 OFFICE O/P EST LOW 20 MIN: CPT | Performed by: INTERNAL MEDICINE

## 2018-01-31 PROCEDURE — 93000 ELECTROCARDIOGRAM COMPLETE: CPT | Performed by: INTERNAL MEDICINE

## 2018-01-31 RX ORDER — MELATONIN
1000 DAILY
COMMUNITY
End: 2021-12-17

## 2018-01-31 RX ORDER — FERROUS SULFATE TAB EC 324 MG (65 MG FE EQUIVALENT) 324 (65 FE) MG
324 TABLET DELAYED RESPONSE ORAL
COMMUNITY
End: 2020-07-24

## 2018-02-06 ENCOUNTER — TELEPHONE (OUTPATIENT)
Dept: GASTROENTEROLOGY | Facility: CLINIC | Age: 43
End: 2018-02-06

## 2018-02-06 ENCOUNTER — OFFICE VISIT (OUTPATIENT)
Dept: GASTROENTEROLOGY | Facility: CLINIC | Age: 43
End: 2018-02-06

## 2018-02-06 VITALS
DIASTOLIC BLOOD PRESSURE: 110 MMHG | TEMPERATURE: 98.7 F | WEIGHT: 220 LBS | SYSTOLIC BLOOD PRESSURE: 152 MMHG | BODY MASS INDEX: 32.58 KG/M2 | HEIGHT: 69 IN

## 2018-02-06 DIAGNOSIS — K25.0 ACUTE GASTRIC ULCER WITH HEMORRHAGE: Primary | ICD-10-CM

## 2018-02-06 PROCEDURE — 99214 OFFICE O/P EST MOD 30 MIN: CPT | Performed by: NURSE PRACTITIONER

## 2018-02-06 NOTE — TELEPHONE ENCOUNTER
----- Message from STANLEY Lackey sent at 2/6/2018 11:46 AM EST -----  Regarding: Clearance to stop Plavix  Please contact Dr. Nick Sahu to see if pt can stop Plavix 5 days prior to planned EGD early March to follow up on prior /GI bleed.  He had recent CAD and stent placement in mid December.  Once cleared please let me know and I will notify pt and place orders  Thanks

## 2018-02-06 NOTE — PROGRESS NOTES
Chief Complaint   Patient presents with   • Follow-up     Hospital (Bleeding ulcer)     HPI    42-year-old male seen initially by Dr. Pascual as a hospital consult in early January when he was admitted to the hospital with dark stools and found to be anemic with an upper GI bleed.  History was pertinent for coronary artery disease with 2 stents placed on December 17 with bleeding that developed after initiation of Plavix and aspirin.  An EGD was performed on January 7 with findings of a fundus ulcer.  He is currently maintained both on Plavix as well as aspirin.  He has noticed no further bleeding.  His hemoglobin has steadily increased and is being followed by his PCP.  He is now on an oral iron supplement as well.  He is here today to schedule a follow-up EGD    Patient denies any issues post discharge.  He is having no chest pain, shortness of air, dizziness, no dark stools and no bright red blood per rectum.  He has no abdominal pain.  His prior EGD is as noted above.  He has never had a screening colonoscopy.  He gives no family history of colorectal cancer or irritable bowel disease.  His labs been reviewed in Epic showing an increase in his hemoglobin.  He was started on oral iron supplementation within the last couple of weeks by his primary care provider with plans for follow-up labs to be obtained in the next few weeks to months to monitor his response to the oral iron therapy.  He has noticed some sluggishness of his bowels after initiating the iron therapy    Past Medical History:   Diagnosis Date   • Coronary artery disease    • History of transfusion    • Hyperlipidemia    • Hypertension    • Lumbar spondylosis 1/22/2018   • ST elevation myocardial infarction (STEMI) 12/17/2017     Past Surgical History:   Procedure Laterality Date   • APPENDECTOMY  2003   • CARDIAC CATHETERIZATION N/A 12/17/2017    Procedure: Left Heart Cath;  Surgeon: Nick Sahu MD;  Location: [facility] CATH INVASIVE LOCATION;  Service:     • CARDIAC CATHETERIZATION N/A 12/17/2017    Procedure: Stent LAUREN coronary;  Surgeon: Nick Sahu MD;  Location:  [facility] CATH INVASIVE LOCATION;  Service:    • CARDIAC CATHETERIZATION  12/17/2017    Procedure: Percutaneous Manual Thrombectomy;  Surgeon: Nick Sahu MD;  Location: [facility] CATH INVASIVE LOCATION;  Service:    • CORONARY STENT PLACEMENT  12/17/2017    x 2   • ENDOSCOPY N/A 1/7/2018    (10:00 AM) Hiatal hernia, non-bleeding gastric ulcer with visible vessel, duodenitis, normal second portion of the duodenum and third portion of the duodenum   • ENDOSCOPY  01/07/2018    (02:41 PM) Hiatal hernia, non-bleeding gastric ulcer with a visible vessel, injected, clip, duodenitis   • JOINT REPLACEMENT         Current Outpatient Prescriptions   Medication Sig Dispense Refill   • aspirin 81 MG chewable tablet Chew 1 tablet Daily. 30 tablet 5   • atorvastatin (LIPITOR) 20 MG tablet Take 1 tablet by mouth Daily. 30 tablet 11   • carvedilol (COREG) 6.25 MG tablet Take 1 tablet by mouth 2 (two) times a day with meals. 60 tablet 0   • cholecalciferol (VITAMIN D3) 1000 units tablet Take 1,000 Units by mouth Daily.     • clopidogrel (PLAVIX) 75 MG tablet Take 1 tablet by mouth Daily. 30 tablet 5   • ferrous sulfate 324 (65 Fe) MG tablet delayed-release EC tablet Take 324 mg by mouth Daily With Breakfast.     • pantoprazole (PROTONIX) 40 MG EC tablet Take 1 tablet by mouth Daily. 60 tablet 2     No current facility-administered medications for this visit.        PRN Meds:.    No Known Allergies    Social History     Social History   • Marital status: Single     Spouse name: N/A   • Number of children: N/A   • Years of education: N/A     Occupational History   • unemployed      Social History Main Topics   • Smoking status: Former Smoker     Packs/day: 1.00     Years: 15.00     Types: Cigarettes     Quit date: 12/17/2017   • Smokeless tobacco: Never Used   • Alcohol use No   • Drug use: No      Comment: occ   • Sexual  "activity: Yes     Partners: Female     Other Topics Concern   • Not on file     Social History Narrative       Family History   Problem Relation Age of Onset   • Heart disease Mother    • Alcohol abuse Mother    • Heart disease Father    • Heart attack Father    • Diabetes Father    • Cancer Father      prostate   • Heart attack Sister    • Heart disease Sister    • Depression Sister    • Depression Sister        Review of Systems   Constitutional: Negative for activity change, appetite change and unexpected weight change.   HENT: Negative for trouble swallowing.    Respiratory: Negative for chest tightness and shortness of breath.    Cardiovascular: Negative for chest pain, palpitations and leg swelling.   Gastrointestinal: Negative for abdominal distention, abdominal pain, anal bleeding, blood in stool, constipation, diarrhea, nausea and vomiting.   Allergic/Immunologic: Negative for immunocompromised state.   Neurological: Negative for dizziness.       Vitals:    02/06/18 1139   BP: (!) 152/110   Temp: 98.7 °F (37.1 °C)     BP (!) 152/110 (BP Location: Left arm, Patient Position: Sitting)  Temp 98.7 °F (37.1 °C) (Oral)   Ht 175.3 cm (69.02\")  Wt 99.8 kg (220 lb)  BMI 32.47 kg/m2     Physical Exam   Constitutional: He is oriented to person, place, and time. He appears well-developed and well-nourished. No distress.   HENT:   Head: Normocephalic and atraumatic.   Eyes: No scleral icterus.   Cardiovascular: Normal rate and regular rhythm.    Pulmonary/Chest: Effort normal and breath sounds normal.   Abdominal: Soft. Bowel sounds are normal.   Neurological: He is alert and oriented to person, place, and time.   Skin: Skin is warm and dry.   Psychiatric: He has a normal mood and affect.   Vitals reviewed.      ASSESSMENT AND PLAN    Ruddy was seen today for follow-up.    Diagnoses and all orders for this visit:    Acute gastric ulcer with hemorrhage  Comments:  Per EGD 1/7/2018, plan for f/u EGD  Orders:  -     " Case Request; Standing  -     Case Request    Other orders  -     Obtain informed consent; Standing    We will contact Dr. Nick Sahu, the patient's cardiologist, to discuss stopping Plavix prior to follow-up EGD which will be due in early March.  If he is unable to stop the Plavix we will need to discuss with Dr. Pascual performing the EGD for f/u on the fundus ulcer and if there are findings of gastric polyps etc. he would have to have the procedure repeated when it would be safe to stop his Plavix for removal.    I have instructed him to start a stool softener while he is on the iron to avoid constipation.  His labs have been reviewed and he will follow with his primary care provider for continued monitoring of his hemoglobin as well as his iron stores after initiation of oral iron supplementation within the last several weeks.  Further follow-up pending outcome of planned endoscopy         STANLEY Davila   [facility]  [address] [place] [address]  Office: [phone]

## 2018-02-08 NOTE — TELEPHONE ENCOUNTER
Spoke to patient regarding clearance from Dr. Sahu to stop Plavix 5 days prior to scheduled endoscopy procedure with Dr. Pascual.  Procedure scheduled for 3/27.  Pt verbalized understanding of information.

## 2018-02-12 ENCOUNTER — OFFICE VISIT (OUTPATIENT)
Dept: FAMILY MEDICINE CLINIC | Facility: CLINIC | Age: 43
End: 2018-02-12

## 2018-02-12 VITALS
WEIGHT: 221.8 LBS | BODY MASS INDEX: 32.85 KG/M2 | HEIGHT: 69 IN | TEMPERATURE: 98.7 F | DIASTOLIC BLOOD PRESSURE: 90 MMHG | SYSTOLIC BLOOD PRESSURE: 142 MMHG | OXYGEN SATURATION: 98 % | HEART RATE: 104 BPM

## 2018-02-12 DIAGNOSIS — D64.9 ANEMIA, UNSPECIFIED TYPE: ICD-10-CM

## 2018-02-12 DIAGNOSIS — K92.2 UPPER GI BLEED: ICD-10-CM

## 2018-02-12 DIAGNOSIS — I10 ESSENTIAL HYPERTENSION: ICD-10-CM

## 2018-02-12 DIAGNOSIS — D48.5 NEOPLASM OF UNCERTAIN BEHAVIOR OF SKIN: Primary | ICD-10-CM

## 2018-02-12 PROCEDURE — 99214 OFFICE O/P EST MOD 30 MIN: CPT | Performed by: PHYSICIAN ASSISTANT

## 2018-02-12 RX ORDER — CARVEDILOL 6.25 MG/1
6.25 TABLET ORAL 2 TIMES DAILY WITH MEALS
Qty: 60 TABLET | Refills: 0 | Status: SHIPPED | OUTPATIENT
Start: 2018-02-12 | End: 2018-03-11 | Stop reason: SDUPTHER

## 2018-02-12 RX ORDER — CARVEDILOL 6.25 MG/1
6.25 TABLET ORAL 2 TIMES DAILY WITH MEALS
Qty: 60 TABLET | Refills: 0 | Status: SHIPPED | OUTPATIENT
Start: 2018-02-12 | End: 2018-02-12 | Stop reason: SDUPTHER

## 2018-02-12 NOTE — PATIENT INSTRUCTIONS
42 YEAR OLD MALE WHO PRESENTS TODAY IN FOLLOW UP OF HTN AND ANEMIA. BP IMPROVED BUT NOT CONTROLLED. HE REPORTS ONLY TAKING COREG ONCE DAILY. TAKE CARVEDILOL 6.25 MG TWICE DAILY. CHECK BP ONCE DAILY- VARY TIME- SOMETIMES IN MORNING, AFTERNOON AND EVENING. GET BLOOD PRESSURE AND PULSE LOG TO ME IN 2 WEEKS. I WILL CHECK LABS TODAY- HE WAS TO RETURN IN 4 WEEKS AND IS A LITTLE EARLY BUT WE PIETER CHECK LABS TODAY.     PATIENT HAS NUMEROUS NEVI ON BACK. HE PREVIOUSLY WAS SEEN BY DERMATOLOGY REGULARLY, HOWEVER, DERMATOLOGIST DOES NOT TAKE HIS INSURANCE. I WILL REFER TO NEW DERMATOLOGIST.

## 2018-02-12 NOTE — PROGRESS NOTES
Subjective   Ruddy Griffin is a 42 y.o. male seen today for follow-up decreased iron level     History of Present Illness     HEADACHES RESOLVED BUT ONLY VERY SLIGHT DIZZINESS WHEN BENDS THEN STANDS UP. OTHERWISE, FEELING OK. DIFFICULTY TOLERATING IRON- BELCHES UP.     NO NEW CONCERNS. BP ELEVATED IN OFFICE. BP AT HOME- 133/72. LOWEST BP READING AT HOME 125/70S.   THIS AM, O2 98%, 98- PULSE ALWAYS FAST. ONLY TAKING COREG ONCE DAILY.  HAD OLD RX FROM ER MD IN 2016.      HAS NUMEROUS MOLES ON HIS BACK. SEES DERMATOLOGY REGULARLY BUT THEY DO NOT TAKE INSURANCE. NEEDS NEW DERMATOLOGIST.     The following portions of the patient's history were reviewed and updated as appropriate: allergies, current medications, past family history, past medical history, past social history, past surgical history and problem list.    Review of Systems   Neurological: Positive for dizziness.   All other systems reviewed and are negative.      Objective   Physical Exam   Constitutional: He is oriented to person, place, and time. He appears well-developed.   HENT:   Head: Normocephalic and atraumatic.   Right Ear: External ear normal.   Left Ear: External ear normal.   Eyes: Conjunctivae are normal.   Neck: Carotid bruit is not present. No tracheal deviation present. No thyroid mass and no thyromegaly present.   Cardiovascular: Normal rate, regular rhythm, normal heart sounds and intact distal pulses.    Pulmonary/Chest: Effort normal and breath sounds normal.   Neurological: He is alert and oriented to person, place, and time. Gait normal.   Skin: Skin is warm and dry.   Psychiatric: He has a normal mood and affect. His behavior is normal. Judgment and thought content normal.   Nursing note and vitals reviewed.      Assessment/Plan   Ruddy was seen today for follow-up.    Diagnoses and all orders for this visit:    Neoplasm of uncertain behavior of skin  -     Ambulatory Referral to Dermatology  -     Discontinue: carvedilol  (COREG) 6.25 MG tablet; Take 1 tablet by mouth 2 (Two) Times a Day With Meals.  -     CBC & Differential  -     Comprehensive Metabolic Panel  -     Iron and TIBC  -     Ferritin  -     carvedilol (COREG) 6.25 MG tablet; Take 1 tablet by mouth 2 (Two) Times a Day With Meals.    Essential hypertension  -     Discontinue: carvedilol (COREG) 6.25 MG tablet; Take 1 tablet by mouth 2 (Two) Times a Day With Meals.  -     CBC & Differential  -     Comprehensive Metabolic Panel  -     Iron and TIBC  -     Ferritin  -     carvedilol (COREG) 6.25 MG tablet; Take 1 tablet by mouth 2 (Two) Times a Day With Meals.    Anemia, unspecified type    Upper GI bleed      Patient Instructions   42 YEAR OLD MALE WHO PRESENTS TODAY IN FOLLOW UP OF HTN AND ANEMIA. BP IMPROVED BUT NOT CONTROLLED. HE REPORTS ONLY TAKING COREG ONCE DAILY. TAKE CARVEDILOL 6.25 MG TWICE DAILY. CHECK BP ONCE DAILY- VARY TIME- SOMETIMES IN MORNING, AFTERNOON AND EVENING. GET BLOOD PRESSURE AND PULSE LOG TO ME IN 2 WEEKS. I WILL CHECK LABS TODAY- HE WAS TO RETURN IN 4 WEEKS AND IS A LITTLE EARLY BUT WE PIETER CHECK LABS TODAY.     PATIENT HAS NUMEROUS NEVI ON BACK. HE PREVIOUSLY WAS SEEN BY DERMATOLOGY REGULARLY, HOWEVER, DERMATOLOGIST DOES NOT TAKE HIS INSURANCE. I WILL REFER TO NEW DERMATOLOGIST.

## 2018-02-13 LAB
ALBUMIN SERPL-MCNC: 4.7 G/DL (ref 3.5–5.5)
ALBUMIN/GLOB SERPL: 1.5 {RATIO} (ref 1.2–2.2)
ALP SERPL-CCNC: 91 IU/L (ref 39–117)
ALT SERPL-CCNC: 57 IU/L (ref 0–44)
AST SERPL-CCNC: 24 IU/L (ref 0–40)
BASOPHILS # BLD AUTO: 0 X10E3/UL (ref 0–0.2)
BASOPHILS NFR BLD AUTO: 0 %
BILIRUB SERPL-MCNC: <0.2 MG/DL (ref 0–1.2)
BUN SERPL-MCNC: 17 MG/DL (ref 6–24)
BUN/CREAT SERPL: 17 (ref 9–20)
CALCIUM SERPL-MCNC: 10 MG/DL (ref 8.7–10.2)
CHLORIDE SERPL-SCNC: 103 MMOL/L (ref 96–106)
CO2 SERPL-SCNC: 22 MMOL/L (ref 18–29)
CREAT SERPL-MCNC: 1 MG/DL (ref 0.76–1.27)
EOSINOPHIL # BLD AUTO: 0.1 X10E3/UL (ref 0–0.4)
EOSINOPHIL NFR BLD AUTO: 1 %
ERYTHROCYTE [DISTWIDTH] IN BLOOD BY AUTOMATED COUNT: 17.4 % (ref 12.3–15.4)
FERRITIN SERPL-MCNC: 41 NG/ML (ref 30–400)
GFR SERPLBLD CREATININE-BSD FMLA CKD-EPI: 108 ML/MIN/1.73
GFR SERPLBLD CREATININE-BSD FMLA CKD-EPI: 93 ML/MIN/1.73
GLOBULIN SER CALC-MCNC: 3.2 G/DL (ref 1.5–4.5)
GLUCOSE SERPL-MCNC: 112 MG/DL (ref 65–99)
HCT VFR BLD AUTO: 38.3 % (ref 37.5–51)
HGB BLD-MCNC: 12.4 G/DL (ref 13–17.7)
IMM GRANULOCYTES # BLD: 0 X10E3/UL (ref 0–0.1)
IMM GRANULOCYTES NFR BLD: 0 %
IRON SATN MFR SERPL: 32 % (ref 15–55)
IRON SERPL-MCNC: 126 UG/DL (ref 38–169)
LYMPHOCYTES # BLD AUTO: 1.7 X10E3/UL (ref 0.7–3.1)
LYMPHOCYTES NFR BLD AUTO: 28 %
MCH RBC QN AUTO: 26.3 PG (ref 26.6–33)
MCHC RBC AUTO-ENTMCNC: 32.4 G/DL (ref 31.5–35.7)
MCV RBC AUTO: 81 FL (ref 79–97)
MONOCYTES # BLD AUTO: 0.5 X10E3/UL (ref 0.1–0.9)
MONOCYTES NFR BLD AUTO: 8 %
NEUTROPHILS # BLD AUTO: 3.8 X10E3/UL (ref 1.4–7)
NEUTROPHILS NFR BLD AUTO: 63 %
PLATELET # BLD AUTO: 254 X10E3/UL (ref 150–379)
POTASSIUM SERPL-SCNC: 4.8 MMOL/L (ref 3.5–5.2)
PROT SERPL-MCNC: 7.9 G/DL (ref 6–8.5)
RBC # BLD AUTO: 4.71 X10E6/UL (ref 4.14–5.8)
SODIUM SERPL-SCNC: 140 MMOL/L (ref 134–144)
TIBC SERPL-MCNC: 391 UG/DL (ref 250–450)
UIBC SERPL-MCNC: 265 UG/DL (ref 111–343)
WBC # BLD AUTO: 6.1 X10E3/UL (ref 3.4–10.8)

## 2018-02-14 ENCOUNTER — TELEPHONE (OUTPATIENT)
Dept: FAMILY MEDICINE CLINIC | Facility: CLINIC | Age: 43
End: 2018-02-14

## 2018-02-14 NOTE — PROGRESS NOTES
Subjective:     Encounter Date:01/31/2018      Patient ID: Ruddy Griffin is a 42 y.o. male.    Chief Complaint: STEMI, GIB    History of Present Illness    Dear Alexa Romero,     I had the pleasure of seeing Ruddy Griffin in cardiac followup today. As you well know, he is a saranya 42-year-old man with history of recent STEMI complicated by VF arrest. He ended up having treatment of his right coronary with good result.    A month later he presented to the hospital with upper GI bleeding. He was found to have a bleeding ulcer in the fundus which was injected and clipped. He had no more bleeding afterwards and was able to resume his clopidogrel.     Since his recovery he has reported no symptoms of angina. He has had no more bleeding. He is using CPAP. He stopped smoking. He gets his blood pressure checked at home and seems to be doing well.         Review of Systems   All other systems reviewed and are negative.        ECG 12 Lead  Date/Time: 1/31/2018 9:00 AM  Performed by: CECILIA BRADFORD  Authorized by: CECILIA BRADFORD   Comparison: compared with previous ECG   Similar to previous ECG  Rhythm: sinus rhythm  BPM: 70  Clinical impression: normal ECG               Objective:     Physical Exam   Constitutional: He is oriented to person, place, and time. He appears well-developed and well-nourished.   HENT:   Head: Normocephalic and atraumatic.   Neck: Normal range of motion. Neck supple.   Cardiovascular: Normal rate, regular rhythm and normal heart sounds.    Pulmonary/Chest: Effort normal and breath sounds normal.   Abdominal: Soft. Bowel sounds are normal.   Musculoskeletal: Normal range of motion.   Neurological: He is alert and oriented to person, place, and time.   Skin: Skin is warm and dry.   Psychiatric: He has a normal mood and affect. His behavior is normal. Thought content normal.   Vitals reviewed.      Lab Review:       Assessment:          Diagnosis Plan   1. ST elevation myocardial infarction involving  right coronary artery     2. Coronary artery disease involving native coronary artery of native heart without angina pectoris     3. Upper GI bleed            Plan:       It was a pleasure to see your patient in cardiac followup today.  He is doing very well from the cardiac standpoint without any complaints of angina or heart failure.  He is engaged in cardiac rehabilitation.      He had a GI bleed due to ulcer which was treated.  He is back on dual antiplatelet therapy which he tolerates well.  I will see him again in three months or sooner if symptoms warrant.      Coronary Artery Disease  Assessment  • The patient has no angina    Plan  • Lifestyle modifications discussed include adhering to a heart healthy diet, avoidance of tobacco products, maintenance of a healthy weight, medication compliance, regular exercise and regular monitoring of cholesterol and blood pressure    Subjective - Objective  • There is a history of past MI  • There has been a previous stent procedure using LAUREN  • Current antiplatelet therapy includes aspirin 81 mg and clopidogrel 75 mg

## 2018-02-15 DIAGNOSIS — E78.2 HYPERLIPIDEMIA, MIXED: Primary | ICD-10-CM

## 2018-02-15 DIAGNOSIS — R79.89 ELEVATED LIVER FUNCTION TESTS: ICD-10-CM

## 2018-02-28 RX ORDER — CLOPIDOGREL BISULFATE 75 MG/1
75 TABLET ORAL DAILY
Qty: 30 TABLET | Refills: 5 | Status: SHIPPED | OUTPATIENT
Start: 2018-02-28 | End: 2018-11-27 | Stop reason: SDUPTHER

## 2018-03-11 DIAGNOSIS — I10 ESSENTIAL HYPERTENSION: ICD-10-CM

## 2018-03-11 DIAGNOSIS — D48.5 NEOPLASM OF UNCERTAIN BEHAVIOR OF SKIN: ICD-10-CM

## 2018-03-12 RX ORDER — CARVEDILOL 6.25 MG/1
TABLET ORAL
Qty: 60 TABLET | Refills: 0 | Status: SHIPPED | OUTPATIENT
Start: 2018-03-12 | End: 2018-04-10 | Stop reason: SDUPTHER

## 2018-04-10 DIAGNOSIS — I10 ESSENTIAL HYPERTENSION: ICD-10-CM

## 2018-04-10 DIAGNOSIS — D48.5 NEOPLASM OF UNCERTAIN BEHAVIOR OF SKIN: ICD-10-CM

## 2018-04-10 RX ORDER — CARVEDILOL 6.25 MG/1
TABLET ORAL
Qty: 60 TABLET | Refills: 2 | Status: SHIPPED | OUTPATIENT
Start: 2018-04-10 | End: 2018-09-21 | Stop reason: SDUPTHER

## 2018-04-23 ENCOUNTER — TELEPHONE (OUTPATIENT)
Dept: CARDIAC REHAB | Facility: HOSPITAL | Age: 43
End: 2018-04-23

## 2018-04-23 NOTE — TELEPHONE ENCOUNTER
Called pt since his referral to program was in December, but pt never started due to illness/rehospitalization.  Pt said he is doing his own home exercise program, he has stopped smoking, and is eating a heart healthy diet.  He doesn't feel he needs a formal program now.

## 2018-07-05 RX ORDER — ASPIRIN 81 MG/1
TABLET, CHEWABLE ORAL
Qty: 30 TABLET | Refills: 6 | Status: SHIPPED | OUTPATIENT
Start: 2018-07-05

## 2018-08-15 ENCOUNTER — APPOINTMENT (OUTPATIENT)
Dept: GENERAL RADIOLOGY | Facility: HOSPITAL | Age: 43
End: 2018-08-15

## 2018-08-15 PROCEDURE — 73130 X-RAY EXAM OF HAND: CPT | Performed by: EMERGENCY MEDICINE

## 2018-08-15 PROCEDURE — 73110 X-RAY EXAM OF WRIST: CPT | Performed by: EMERGENCY MEDICINE

## 2018-09-11 DIAGNOSIS — D48.5 NEOPLASM OF UNCERTAIN BEHAVIOR OF SKIN: ICD-10-CM

## 2018-09-11 DIAGNOSIS — I10 ESSENTIAL HYPERTENSION: ICD-10-CM

## 2018-09-11 NOTE — TELEPHONE ENCOUNTER
We cannot just deny Coreg- he needs to make and keep appt. Maybe give 2 week supply until he is seen.

## 2018-09-11 NOTE — TELEPHONE ENCOUNTER
Pt has cancelled and no showed last few appointments. Would you like this to be denied until he sees you?

## 2018-09-12 RX ORDER — CARVEDILOL 6.25 MG/1
TABLET ORAL
Qty: 60 TABLET | Refills: 0 | OUTPATIENT
Start: 2018-09-12

## 2018-09-12 NOTE — TELEPHONE ENCOUNTER
Patient doesn't want to schedule appointment, states he will ask the cardiologist to refill his medication

## 2018-09-12 NOTE — TELEPHONE ENCOUNTER
PLEASE CHECK WITH PATIENT TO SEE IF HE IS CHANGING PRIMARY CARE OR IF HE IS STILL FOLLOWING HERE. HE DID NOT GO FOR CARDIOLOGY OR GI FOLLOW UP AND HE WAS TO FOLLOW UP IN 2 MONTHS FROM 2/2018 APPT.

## 2018-09-21 DIAGNOSIS — D48.5 NEOPLASM OF UNCERTAIN BEHAVIOR OF SKIN: ICD-10-CM

## 2018-09-21 DIAGNOSIS — I10 ESSENTIAL HYPERTENSION: ICD-10-CM

## 2018-09-21 RX ORDER — CARVEDILOL 6.25 MG/1
6.25 TABLET ORAL 2 TIMES DAILY WITH MEALS
Qty: 60 TABLET | Refills: 0 | Status: SHIPPED | OUTPATIENT
Start: 2018-09-21 | End: 2018-11-27 | Stop reason: SDUPTHER

## 2018-10-24 DIAGNOSIS — I10 ESSENTIAL HYPERTENSION: ICD-10-CM

## 2018-10-24 DIAGNOSIS — D48.5 NEOPLASM OF UNCERTAIN BEHAVIOR OF SKIN: ICD-10-CM

## 2018-10-24 RX ORDER — CARVEDILOL 6.25 MG/1
TABLET ORAL
Qty: 60 TABLET | Refills: 0 | OUTPATIENT
Start: 2018-10-24

## 2018-11-27 DIAGNOSIS — I10 ESSENTIAL HYPERTENSION: ICD-10-CM

## 2018-11-27 DIAGNOSIS — D48.5 NEOPLASM OF UNCERTAIN BEHAVIOR OF SKIN: ICD-10-CM

## 2018-11-27 RX ORDER — CARVEDILOL 6.25 MG/1
6.25 TABLET ORAL 2 TIMES DAILY WITH MEALS
Qty: 180 TABLET | Refills: 0 | Status: SHIPPED | OUTPATIENT
Start: 2018-11-27 | End: 2019-04-05 | Stop reason: SDUPTHER

## 2018-11-27 RX ORDER — ATORVASTATIN CALCIUM 20 MG/1
20 TABLET, FILM COATED ORAL DAILY
Qty: 90 TABLET | Refills: 0 | Status: SHIPPED | OUTPATIENT
Start: 2018-11-27 | End: 2019-04-05 | Stop reason: SDUPTHER

## 2018-11-27 RX ORDER — CLOPIDOGREL BISULFATE 75 MG/1
75 TABLET ORAL DAILY
Qty: 90 TABLET | Refills: 0 | Status: SHIPPED | OUTPATIENT
Start: 2018-11-27 | End: 2019-04-05 | Stop reason: SDUPTHER

## 2019-04-05 ENCOUNTER — TELEPHONE (OUTPATIENT)
Dept: CARDIOLOGY | Facility: CLINIC | Age: 44
End: 2019-04-05

## 2019-04-05 DIAGNOSIS — D48.5 NEOPLASM OF UNCERTAIN BEHAVIOR OF SKIN: ICD-10-CM

## 2019-04-05 DIAGNOSIS — I10 ESSENTIAL HYPERTENSION: ICD-10-CM

## 2019-04-05 RX ORDER — CLOPIDOGREL BISULFATE 75 MG/1
75 TABLET ORAL DAILY
Qty: 30 TABLET | Refills: 0 | Status: SHIPPED | OUTPATIENT
Start: 2019-04-05 | End: 2019-06-04 | Stop reason: SDUPTHER

## 2019-04-05 RX ORDER — CARVEDILOL 6.25 MG/1
TABLET ORAL
Qty: 60 TABLET | Refills: 0 | Status: SHIPPED | OUTPATIENT
Start: 2019-04-05 | End: 2019-06-04 | Stop reason: SDUPTHER

## 2019-04-05 RX ORDER — ATORVASTATIN CALCIUM 20 MG/1
20 TABLET, FILM COATED ORAL DAILY
Qty: 30 TABLET | Refills: 0 | Status: SHIPPED | OUTPATIENT
Start: 2019-04-05 | End: 2021-12-17 | Stop reason: SDUPTHER

## 2019-06-04 DIAGNOSIS — I10 ESSENTIAL HYPERTENSION: ICD-10-CM

## 2019-06-04 DIAGNOSIS — D48.5 NEOPLASM OF UNCERTAIN BEHAVIOR OF SKIN: ICD-10-CM

## 2019-06-04 RX ORDER — CLOPIDOGREL BISULFATE 75 MG/1
TABLET ORAL
Qty: 30 TABLET | Refills: 0 | Status: SHIPPED | OUTPATIENT
Start: 2019-06-04 | End: 2019-07-07 | Stop reason: SDUPTHER

## 2019-06-04 RX ORDER — CARVEDILOL 6.25 MG/1
TABLET ORAL
Qty: 60 TABLET | Refills: 0 | Status: SHIPPED | OUTPATIENT
Start: 2019-06-04 | End: 2019-07-07 | Stop reason: SDUPTHER

## 2019-07-07 DIAGNOSIS — D48.5 NEOPLASM OF UNCERTAIN BEHAVIOR OF SKIN: ICD-10-CM

## 2019-07-07 DIAGNOSIS — I10 ESSENTIAL HYPERTENSION: ICD-10-CM

## 2019-07-08 RX ORDER — CARVEDILOL 6.25 MG/1
TABLET ORAL
Qty: 180 TABLET | Refills: 0 | Status: SHIPPED | OUTPATIENT
Start: 2019-07-08 | End: 2019-12-05 | Stop reason: SDUPTHER

## 2019-07-08 RX ORDER — CLOPIDOGREL BISULFATE 75 MG/1
TABLET ORAL
Qty: 90 TABLET | Refills: 0 | Status: SHIPPED | OUTPATIENT
Start: 2019-07-08 | End: 2019-12-05 | Stop reason: SDUPTHER

## 2019-10-15 DIAGNOSIS — I10 ESSENTIAL HYPERTENSION: ICD-10-CM

## 2019-10-15 DIAGNOSIS — D48.5 NEOPLASM OF UNCERTAIN BEHAVIOR OF SKIN: ICD-10-CM

## 2019-10-15 RX ORDER — CLOPIDOGREL BISULFATE 75 MG/1
TABLET ORAL
Qty: 90 TABLET | Refills: 0 | OUTPATIENT
Start: 2019-10-15

## 2019-10-15 RX ORDER — CARVEDILOL 6.25 MG/1
TABLET ORAL
Qty: 180 TABLET | Refills: 0 | OUTPATIENT
Start: 2019-10-15

## 2019-12-05 ENCOUNTER — TELEPHONE (OUTPATIENT)
Dept: CARDIOLOGY | Facility: CLINIC | Age: 44
End: 2019-12-05

## 2019-12-05 DIAGNOSIS — D48.5 NEOPLASM OF UNCERTAIN BEHAVIOR OF SKIN: ICD-10-CM

## 2019-12-05 DIAGNOSIS — I10 ESSENTIAL HYPERTENSION: ICD-10-CM

## 2019-12-05 RX ORDER — CLOPIDOGREL BISULFATE 75 MG/1
75 TABLET ORAL DAILY
Qty: 7 TABLET | Refills: 0 | Status: SHIPPED | OUTPATIENT
Start: 2019-12-05 | End: 2020-07-15 | Stop reason: SDUPTHER

## 2019-12-05 RX ORDER — CARVEDILOL 6.25 MG/1
6.25 TABLET ORAL 2 TIMES DAILY WITH MEALS
Qty: 14 TABLET | Refills: 0 | Status: SHIPPED | OUTPATIENT
Start: 2019-12-05 | End: 2020-07-15 | Stop reason: SDUPTHER

## 2019-12-05 NOTE — TELEPHONE ENCOUNTER
Former patient of Dr. Sahu, requesting refills.  He needs to be scheduled with a new provider and SHOW UP for the appt.    Please call patient to schedule, and let me know so I can send refill to get him through to next appt.    He can be reached at [phone].    Thank you!

## 2019-12-05 NOTE — TELEPHONE ENCOUNTER
Spoke with Mr Griffin and scheduled for Monday 12/9/19 with Dr Guillaume. He said he has been without meds for 3 days.  Thank you,  Codi

## 2020-07-15 ENCOUNTER — TELEPHONE (OUTPATIENT)
Dept: CARDIOLOGY | Facility: CLINIC | Age: 45
End: 2020-07-15

## 2020-07-15 RX ORDER — CLOPIDOGREL BISULFATE 75 MG/1
75 TABLET ORAL DAILY
Qty: 90 TABLET | Refills: 3 | Status: SHIPPED | OUTPATIENT
Start: 2020-07-15 | End: 2021-12-17

## 2020-07-15 RX ORDER — CARVEDILOL 6.25 MG/1
6.25 TABLET ORAL 2 TIMES DAILY
Qty: 180 TABLET | Refills: 3 | Status: SHIPPED | OUTPATIENT
Start: 2020-07-15 | End: 2020-07-24

## 2020-07-15 NOTE — TELEPHONE ENCOUNTER
Pharmacy verified with patient. Meds ordered for 90 day supply.    Thank you,  Kristi Escamilla RN  [facility]  Triage

## 2020-07-15 NOTE — TELEPHONE ENCOUNTER
Dr. Guillaume,    Mr. Griffin is a former patient of Dr. Santos.  He has an appointment with you on Monday.  He called today because he has been off of his carvidilol and clopidogrel since February and does not have any refills.  He says he can tell his BP is elevated though he reports no symptoms and doesn't monitor at home.  His father has been ill and passed away last week. 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    He is asking if we can send in a new prescription for the carvidilol especially. Even if it's only for a few days to get him to his appt with you Monday.    Thank you,  Kristi Escamilla RN  [facility]  Triage

## 2020-07-15 NOTE — TELEPHONE ENCOUNTER
Please feel free to refill these medications.  We can definitely give him a 90-day supply and will see him on Monday.  Let me know if there is any further questions.

## 2020-07-16 ENCOUNTER — HOSPITAL ENCOUNTER (OUTPATIENT)
Facility: HOSPITAL | Age: 45
Setting detail: OBSERVATION
Discharge: HOME | End: 2020-07-17
Attending: EMERGENCY MEDICINE | Admitting: INTERNAL MEDICINE

## 2020-07-16 ENCOUNTER — APPOINTMENT (OUTPATIENT)
Dept: GENERAL RADIOLOGY | Facility: HOSPITAL | Age: 45
End: 2020-07-16

## 2020-07-16 DIAGNOSIS — R07.9 CHEST PAIN, UNSPECIFIED TYPE: Primary | ICD-10-CM

## 2020-07-16 DIAGNOSIS — R91.1 LESION OF RIGHT LUNG: ICD-10-CM

## 2020-07-16 DIAGNOSIS — I10 ESSENTIAL HYPERTENSION: ICD-10-CM

## 2020-07-16 LAB
ALBUMIN SERPL-MCNC: 4.2 G/DL (ref 3.5–5.2)
ALBUMIN/GLOB SERPL: 1.4 G/DL
ALP SERPL-CCNC: 73 U/L (ref 39–117)
ALT SERPL W P-5'-P-CCNC: 48 U/L (ref 1–41)
ANION GAP SERPL CALCULATED.3IONS-SCNC: 11.6 MMOL/L (ref 5–15)
AST SERPL-CCNC: 20 U/L (ref 1–40)
BASOPHILS # BLD AUTO: 0.03 10*3/MM3 (ref 0–0.2)
BASOPHILS NFR BLD AUTO: 0.4 % (ref 0–1.5)
BILIRUB SERPL-MCNC: 0.3 MG/DL (ref 0–1.2)
BUN SERPL-MCNC: 15 MG/DL (ref 6–20)
BUN/CREAT SERPL: 16 (ref 7–25)
CALCIUM SPEC-SCNC: 10.2 MG/DL (ref 8.6–10.5)
CHLORIDE SERPL-SCNC: 100 MMOL/L (ref 98–107)
CO2 SERPL-SCNC: 26.4 MMOL/L (ref 22–29)
CREAT SERPL-MCNC: 0.94 MG/DL (ref 0.76–1.27)
DEPRECATED RDW RBC AUTO: 46.2 FL (ref 37–54)
EOSINOPHIL # BLD AUTO: 0.06 10*3/MM3 (ref 0–0.4)
EOSINOPHIL NFR BLD AUTO: 0.7 % (ref 0.3–6.2)
ERYTHROCYTE [DISTWIDTH] IN BLOOD BY AUTOMATED COUNT: 15.2 % (ref 12.3–15.4)
GFR SERPL CREATININE-BSD FRML MDRD: 87 ML/MIN/1.73
GLOBULIN UR ELPH-MCNC: 3.1 GM/DL
GLUCOSE SERPL-MCNC: 184 MG/DL (ref 65–99)
HCT VFR BLD AUTO: 43.2 % (ref 37.5–51)
HGB BLD-MCNC: 14.3 G/DL (ref 13–17.7)
IMM GRANULOCYTES # BLD AUTO: 0.04 10*3/MM3 (ref 0–0.05)
IMM GRANULOCYTES NFR BLD AUTO: 0.5 % (ref 0–0.5)
LYMPHOCYTES # BLD AUTO: 1.33 10*3/MM3 (ref 0.7–3.1)
LYMPHOCYTES NFR BLD AUTO: 15.5 % (ref 19.6–45.3)
MCH RBC QN AUTO: 27.9 PG (ref 26.6–33)
MCHC RBC AUTO-ENTMCNC: 33.1 G/DL (ref 31.5–35.7)
MCV RBC AUTO: 84.2 FL (ref 79–97)
MONOCYTES # BLD AUTO: 0.44 10*3/MM3 (ref 0.1–0.9)
MONOCYTES NFR BLD AUTO: 5.1 % (ref 5–12)
NEUTROPHILS NFR BLD AUTO: 6.66 10*3/MM3 (ref 1.7–7)
NEUTROPHILS NFR BLD AUTO: 77.8 % (ref 42.7–76)
NRBC BLD AUTO-RTO: 0 /100 WBC (ref 0–0.2)
PLATELET # BLD AUTO: 223 10*3/MM3 (ref 140–450)
PMV BLD AUTO: 11.6 FL (ref 6–12)
POTASSIUM SERPL-SCNC: 4.5 MMOL/L (ref 3.5–5.2)
PROT SERPL-MCNC: 7.3 G/DL (ref 6–8.5)
RBC # BLD AUTO: 5.13 10*6/MM3 (ref 4.14–5.8)
SODIUM SERPL-SCNC: 138 MMOL/L (ref 136–145)
TROPONIN T SERPL-MCNC: <0.01 NG/ML (ref 0–0.03)
TROPONIN T SERPL-MCNC: <0.01 NG/ML (ref 0–0.03)
WBC # BLD AUTO: 8.56 10*3/MM3 (ref 3.4–10.8)

## 2020-07-16 PROCEDURE — 96375 TX/PRO/DX INJ NEW DRUG ADDON: CPT

## 2020-07-16 PROCEDURE — G0378 HOSPITAL OBSERVATION PER HR: HCPCS

## 2020-07-16 PROCEDURE — 84484 ASSAY OF TROPONIN QUANT: CPT | Performed by: INTERNAL MEDICINE

## 2020-07-16 PROCEDURE — 93005 ELECTROCARDIOGRAM TRACING: CPT | Performed by: EMERGENCY MEDICINE

## 2020-07-16 PROCEDURE — 25010000002 MORPHINE PER 10 MG: Performed by: NURSE PRACTITIONER

## 2020-07-16 PROCEDURE — 84484 ASSAY OF TROPONIN QUANT: CPT | Performed by: NURSE PRACTITIONER

## 2020-07-16 PROCEDURE — 99285 EMERGENCY DEPT VISIT HI MDM: CPT

## 2020-07-16 PROCEDURE — 71045 X-RAY EXAM CHEST 1 VIEW: CPT

## 2020-07-16 PROCEDURE — 80053 COMPREHEN METABOLIC PANEL: CPT | Performed by: NURSE PRACTITIONER

## 2020-07-16 PROCEDURE — 25010000002 ONDANSETRON PER 1 MG: Performed by: NURSE PRACTITIONER

## 2020-07-16 PROCEDURE — 25010000002 ENOXAPARIN PER 10 MG: Performed by: INTERNAL MEDICINE

## 2020-07-16 PROCEDURE — 85025 COMPLETE CBC W/AUTO DIFF WBC: CPT | Performed by: NURSE PRACTITIONER

## 2020-07-16 PROCEDURE — 96372 THER/PROPH/DIAG INJ SC/IM: CPT

## 2020-07-16 PROCEDURE — 99284 EMERGENCY DEPT VISIT MOD MDM: CPT

## 2020-07-16 PROCEDURE — 36415 COLL VENOUS BLD VENIPUNCTURE: CPT | Performed by: INTERNAL MEDICINE

## 2020-07-16 PROCEDURE — 96374 THER/PROPH/DIAG INJ IV PUSH: CPT

## 2020-07-16 PROCEDURE — 93010 ELECTROCARDIOGRAM REPORT: CPT | Performed by: INTERNAL MEDICINE

## 2020-07-16 PROCEDURE — 93005 ELECTROCARDIOGRAM TRACING: CPT

## 2020-07-16 RX ORDER — SODIUM CHLORIDE 0.9 % (FLUSH) 0.9 %
10 SYRINGE (ML) INJECTION EVERY 12 HOURS SCHEDULED
Status: DISCONTINUED | OUTPATIENT
Start: 2020-07-16 | End: 2020-07-17 | Stop reason: HOSPADM

## 2020-07-16 RX ORDER — MELATONIN
1000 DAILY
Status: DISCONTINUED | OUTPATIENT
Start: 2020-07-16 | End: 2020-07-17 | Stop reason: HOSPADM

## 2020-07-16 RX ORDER — CARVEDILOL 6.25 MG/1
6.25 TABLET ORAL 2 TIMES DAILY
Status: DISCONTINUED | OUTPATIENT
Start: 2020-07-16 | End: 2020-07-17 | Stop reason: HOSPADM

## 2020-07-16 RX ORDER — PANTOPRAZOLE SODIUM 40 MG/1
40 TABLET, DELAYED RELEASE ORAL DAILY
Status: DISCONTINUED | OUTPATIENT
Start: 2020-07-16 | End: 2020-07-17 | Stop reason: HOSPADM

## 2020-07-16 RX ORDER — ZOLPIDEM TARTRATE 5 MG/1
5 TABLET ORAL NIGHTLY PRN
Status: DISCONTINUED | OUTPATIENT
Start: 2020-07-16 | End: 2020-07-17 | Stop reason: HOSPADM

## 2020-07-16 RX ORDER — FERROUS SULFATE 325(65) MG
325 TABLET ORAL
Status: DISCONTINUED | OUTPATIENT
Start: 2020-07-17 | End: 2020-07-17 | Stop reason: HOSPADM

## 2020-07-16 RX ORDER — ASPIRIN 325 MG
325 TABLET ORAL ONCE
Status: COMPLETED | OUTPATIENT
Start: 2020-07-16 | End: 2020-07-16

## 2020-07-16 RX ORDER — SODIUM CHLORIDE 0.9 % (FLUSH) 0.9 %
10 SYRINGE (ML) INJECTION AS NEEDED
Status: DISCONTINUED | OUTPATIENT
Start: 2020-07-16 | End: 2020-07-17 | Stop reason: HOSPADM

## 2020-07-16 RX ORDER — ONDANSETRON 2 MG/ML
4 INJECTION INTRAMUSCULAR; INTRAVENOUS EVERY 6 HOURS PRN
Status: DISCONTINUED | OUTPATIENT
Start: 2020-07-16 | End: 2020-07-17 | Stop reason: HOSPADM

## 2020-07-16 RX ORDER — CLONIDINE HYDROCHLORIDE 0.1 MG/1
0.1 TABLET ORAL ONCE
Status: COMPLETED | OUTPATIENT
Start: 2020-07-16 | End: 2020-07-16

## 2020-07-16 RX ORDER — ATORVASTATIN CALCIUM 20 MG/1
20 TABLET, FILM COATED ORAL DAILY
Status: DISCONTINUED | OUTPATIENT
Start: 2020-07-16 | End: 2020-07-17 | Stop reason: HOSPADM

## 2020-07-16 RX ORDER — HYDROMORPHONE HYDROCHLORIDE 1 MG/ML
0.5 INJECTION, SOLUTION INTRAMUSCULAR; INTRAVENOUS; SUBCUTANEOUS
Status: DISCONTINUED | OUTPATIENT
Start: 2020-07-16 | End: 2020-07-17 | Stop reason: HOSPADM

## 2020-07-16 RX ORDER — SODIUM CHLORIDE 0.9 % (FLUSH) 0.9 %
10 SYRINGE (ML) INJECTION AS NEEDED
Status: DISCONTINUED | OUTPATIENT
Start: 2020-07-16 | End: 2020-07-16

## 2020-07-16 RX ORDER — ASPIRIN 81 MG/1
81 TABLET ORAL DAILY
Status: DISCONTINUED | OUTPATIENT
Start: 2020-07-16 | End: 2020-07-16

## 2020-07-16 RX ORDER — ASPIRIN 81 MG/1
81 TABLET, CHEWABLE ORAL DAILY
Status: DISCONTINUED | OUTPATIENT
Start: 2020-07-16 | End: 2020-07-17 | Stop reason: HOSPADM

## 2020-07-16 RX ORDER — ALPRAZOLAM 0.25 MG/1
0.25 TABLET ORAL 4 TIMES DAILY PRN
Status: DISCONTINUED | OUTPATIENT
Start: 2020-07-16 | End: 2020-07-17 | Stop reason: HOSPADM

## 2020-07-16 RX ORDER — MORPHINE SULFATE 2 MG/ML
4 INJECTION, SOLUTION INTRAMUSCULAR; INTRAVENOUS ONCE
Status: COMPLETED | OUTPATIENT
Start: 2020-07-16 | End: 2020-07-16

## 2020-07-16 RX ORDER — HYDRALAZINE HYDROCHLORIDE 20 MG/ML
10 INJECTION INTRAMUSCULAR; INTRAVENOUS EVERY 4 HOURS PRN
Status: DISCONTINUED | OUTPATIENT
Start: 2020-07-16 | End: 2020-07-17 | Stop reason: HOSPADM

## 2020-07-16 RX ORDER — ONDANSETRON 2 MG/ML
4 INJECTION INTRAMUSCULAR; INTRAVENOUS ONCE
Status: COMPLETED | OUTPATIENT
Start: 2020-07-16 | End: 2020-07-16

## 2020-07-16 RX ORDER — HYDROCODONE BITARTRATE AND ACETAMINOPHEN 7.5; 325 MG/1; MG/1
1 TABLET ORAL EVERY 6 HOURS PRN
Status: DISCONTINUED | OUTPATIENT
Start: 2020-07-16 | End: 2020-07-17 | Stop reason: HOSPADM

## 2020-07-16 RX ORDER — CLOPIDOGREL BISULFATE 75 MG/1
75 TABLET ORAL DAILY
Status: DISCONTINUED | OUTPATIENT
Start: 2020-07-16 | End: 2020-07-17 | Stop reason: HOSPADM

## 2020-07-16 RX ORDER — ACETAMINOPHEN 325 MG/1
650 TABLET ORAL EVERY 6 HOURS PRN
Status: DISCONTINUED | OUTPATIENT
Start: 2020-07-16 | End: 2020-07-17 | Stop reason: HOSPADM

## 2020-07-16 RX ORDER — CARVEDILOL 3.12 MG/1
3.12 TABLET ORAL 2 TIMES DAILY WITH MEALS
Status: DISCONTINUED | OUTPATIENT
Start: 2020-07-16 | End: 2020-07-16

## 2020-07-16 RX ADMIN — MORPHINE SULFATE 4 MG: 2 INJECTION, SOLUTION INTRAMUSCULAR; INTRAVENOUS at 16:36

## 2020-07-16 RX ADMIN — ENOXAPARIN SODIUM 40 MG: 40 INJECTION SUBCUTANEOUS at 18:01

## 2020-07-16 RX ADMIN — SODIUM CHLORIDE, PRESERVATIVE FREE 10 ML: 5 INJECTION INTRAVENOUS at 20:08

## 2020-07-16 RX ADMIN — ATORVASTATIN CALCIUM 20 MG: 20 TABLET, FILM COATED ORAL at 20:08

## 2020-07-16 RX ADMIN — CARVEDILOL 6.25 MG: 6.25 TABLET, FILM COATED ORAL at 20:08

## 2020-07-16 RX ADMIN — VITAMIN D, TAB 1000IU (100/BT) 1000 UNITS: 25 TAB at 20:08

## 2020-07-16 RX ADMIN — CLONIDINE HYDROCHLORIDE 0.1 MG: 0.1 TABLET ORAL at 14:22

## 2020-07-16 RX ADMIN — ASPIRIN 325 MG: 325 TABLET ORAL at 16:36

## 2020-07-16 RX ADMIN — ONDANSETRON 4 MG: 2 INJECTION INTRAMUSCULAR; INTRAVENOUS at 16:36

## 2020-07-16 RX ADMIN — HYDROCODONE BITARTRATE AND ACETAMINOPHEN 1 TABLET: 7.5; 325 TABLET ORAL at 19:43

## 2020-07-16 RX ADMIN — ZOLPIDEM TARTRATE 5 MG: 5 TABLET ORAL at 22:09

## 2020-07-16 RX ADMIN — CLOPIDOGREL 75 MG: 75 TABLET, FILM COATED ORAL at 18:01

## 2020-07-16 RX ADMIN — PANTOPRAZOLE SODIUM 40 MG: 40 TABLET, DELAYED RELEASE ORAL at 18:01

## 2020-07-16 NOTE — ED PROVIDER NOTES
15:45  Patient seen and examined with nurse practitioner.  Briefly patient presents for chest pain.  Patient has history of coronary disease.  Patient had 2 stents placed.  Patient had stopped taking his blood pressure medicine as well as his Plavix.  Patient has been having intermittent chest pain for the last 3 days.  States it is not worse with deep breathing or worse with exertion.  Does not radiate.      On exam patient is alert and cooperative in no distress.  Patient's heart and lung exam is normal.        Plan is to contact his heart doctor to discuss disposition.    MD ATTESTATION NOTE    The AIDEN and I have discussed this patient's history, physical exam, and treatment plan.  I have reviewed the documentation and personally had a face to face interaction with the patient. I affirm the documentation and agree with the treatment and plan.  The attached note describes my personal findings.       Will Méndez MD  07/16/20 8536       Will Méndez MD  07/16/20 1651

## 2020-07-16 NOTE — ED PROVIDER NOTES
EMERGENCY DEPARTMENT ENCOUNTER    Room Number:  05/05  Date of encounter:  7/16/2020  PCP: Provider, No Known  Historian: Patient      HPI:  Chief Complaint: Chest pain  A complete HPI/ROS/PMH/PSH/SH/FH are unobtainable due to: Nothing    Context: Ruddy Griffin is a 45 y.o. male who arrives to the ED via private vehicle from home.  Patient presents with c/o intermittent, mild to moderate mid chest pressure for the past 3 days.   Patient also complains of headache, gas-like pains.  Patient denies fever, chills, cough, shortness of breath, nausea, vomiting, diaphoresis or radiation of the pain.  Patient states that nothing makes the symptoms better and nothing worsens symptoms.  Patient states that he had an MI back in 2017 and had to 2 stents placed.  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  Patient is a smoker, denies alcohol use.  Patient states he [facility] appointment [facility] on Monday at 845.  He states the pharmacy did give him a 5-day supply of his Plavix and blood pressure medicine.        PAST MEDICAL HISTORY  Active Ambulatory Problems     Diagnosis Date Noted   • History of MI (myocardial infarction) 12/17/2017   • Coronary artery disease involving native coronary artery of native heart without angina pectoris 12/26/2017   • Essential hypertension 12/26/2017   • Hyperlipidemia 12/26/2017   • Upper GI bleed 01/06/2018   • Acute gastric ulcer with hemorrhage 01/22/2018   • Lumbar spondylosis 01/22/2018   • Lumbar degenerative disc disease 01/22/2018     Resolved Ambulatory Problems     Diagnosis Date Noted   • No Resolved Ambulatory Problems     Past Medical History:   Diagnosis Date   • Coronary artery disease    • History of transfusion    • Hypertension    • ST elevation myocardial infarction (STEMI) (CMS/[facility]ly [facility]) 12/17/2017          PAST SURGICAL HISTORY  Past Surgical History:   Procedure Laterality Date   • APPENDECTOMY     • CARDIAC CATHETERIZATION N/A 2017    Procedure: Left Heart Cath;  Surgeon: Nick Sahu MD;  Location: [facility] CATH INVASIVE LOCATION;  Service:    • CARDIAC CATHETERIZATION N/A 2017    Procedure: Stent LAUREN coronary;  Surgeon: Nick Sahu MD;  Location: [facility] CATH INVASIVE LOCATION;  Service:    • CARDIAC CATHETERIZATION  2017    Procedure: Percutaneous Manual Thrombectomy;  Surgeon: Nick Sahu MD;  Location: [facility] CATH INVASIVE LOCATION;  Service:    • CORONARY STENT PLACEMENT  12/17/2017    x 2   • ENDOSCOPY  2018    (02:41 PM) Hiatal hernia, non-bleeding gastric ulcer with a visible vessel, injected, clip, duodenitis   • ENDOSCOPY N/A 2018    Procedure: ESOPHAGOGASTRODUODENOSCOPY;  Surgeon: Francesca Pascual MD;  Location: [facility] OR;  Service:    • JOINT REPLACEMENT           FAMILY HISTORY  Family History   Problem Relation Age of Onset   • Heart disease Mother    • Alcohol abuse Mother    • Heart disease Father    • Heart attack Father    • Diabetes Father    • Cancer Father         prostate   • Heart attack Sister    • Heart disease Sister    • Depression Sister    • Depression Sister          SOCIAL HISTORY  Social History     Socioeconomic History   • Marital status: Single     Spouse name: Not on file   • Number of children: Not on file   • Years of education: Not on file   • Highest education level: Not on file   Occupational History   • Occupation: unemployed   Tobacco Use   • Smoking status: Former Smoker     Packs/day: 1.00     Years: 15.00     Pack years: 15.00     Types: Cigarettes     Last attempt to quit: 2017     Years since quittin.5   • Smokeless tobacco: Never Used   Substance and Sexual Activity   • Alcohol use: No   • Drug use: No     Comment: occ   • Sexual activity: Yes     Partners: Female         ALLERGIES  Patient has no known  allergies.        REVIEW OF SYSTEMS  Review of Systems     All systems reviewed and negative except for those discussed in HPI.        PHYSICAL EXAM    ED Triage Vitals   Temp Heart Rate Resp BP SpO2   07/16/20 1315 07/16/20 1315 07/16/20 1315 07/16/20 1335 07/16/20 1315   98.6 °F (37 °C) 118 18 (!) 178/122 97 %       Physical Exam  GENERAL: Well appearing, non-toxic appearing, not distressed  HENT: normocephalic, atraumatic  EYES: no scleral icterus, PERRL  CV: regular rhythm, regular rate, no murmur  RESPIRATORY: normal effort, CTAB  ABDOMEN: soft, no tenderness, obese abdomen  MUSCULOSKELETAL: no deformity, no calf tenderness or bilateral lower extremity edema  NEURO: alert, moves all extremities, follows commands, mental status normal/baseline  SKIN: warm, dry, no rash   Psych: Appropriate mood and affect  Nursing notes and vital signs reviewed      LAB RESULTS  Recent Results (from the past 24 hour(s))   Comprehensive Metabolic Panel    Collection Time: 07/16/20  2:22 PM   Result Value Ref Range    Glucose 184 (H) 65 - 99 mg/dL    BUN 15 6 - 20 mg/dL    Creatinine 0.94 0.76 - 1.27 mg/dL    Sodium 138 136 - 145 mmol/L    Potassium 4.5 3.5 - 5.2 mmol/L    Chloride 100 98 - 107 mmol/L    CO2 26.4 22.0 - 29.0 mmol/L    Calcium 10.2 8.6 - 10.5 mg/dL    Total Protein 7.3 6.0 - 8.5 g/dL    Albumin 4.20 3.50 - 5.20 g/dL    ALT (SGPT) 48 (H) 1 - 41 U/L    AST (SGOT) 20 1 - 40 U/L    Alkaline Phosphatase 73 39 - 117 U/L    Total Bilirubin 0.3 0.0 - 1.2 mg/dL    eGFR Non African Amer 87 >60 mL/min/1.73    Globulin 3.1 gm/dL    A/G Ratio 1.4 g/dL    BUN/Creatinine Ratio 16.0 7.0 - 25.0    Anion Gap 11.6 5.0 - 15.0 mmol/L   Troponin    Collection Time: 07/16/20  2:22 PM   Result Value Ref Range    Troponin T <0.010 0.000 - 0.030 ng/mL   CBC Auto Differential    Collection Time: 07/16/20  2:22 PM   Result Value Ref Range    WBC 8.56 3.40 - 10.80 10*3/mm3    RBC 5.13 4.14 - 5.80 10*6/mm3    Hemoglobin 14.3 13.0 - 17.7 g/dL     Hematocrit 43.2 37.5 - 51.0 %    MCV 84.2 79.0 - 97.0 fL    MCH 27.9 26.6 - 33.0 pg    MCHC 33.1 31.5 - 35.7 g/dL    RDW 15.2 12.3 - 15.4 %    RDW-SD 46.2 37.0 - 54.0 fl    MPV 11.6 6.0 - 12.0 fL    Platelets 223 140 - 450 10*3/mm3    Neutrophil % 77.8 (H) 42.7 - 76.0 %    Lymphocyte % 15.5 (L) 19.6 - 45.3 %    Monocyte % 5.1 5.0 - 12.0 %    Eosinophil % 0.7 0.3 - 6.2 %    Basophil % 0.4 0.0 - 1.5 %    Immature Grans % 0.5 0.0 - 0.5 %    Neutrophils, Absolute 6.66 1.70 - 7.00 10*3/mm3    Lymphocytes, Absolute 1.33 0.70 - 3.10 10*3/mm3    Monocytes, Absolute 0.44 0.10 - 0.90 10*3/mm3    Eosinophils, Absolute 0.06 0.00 - 0.40 10*3/mm3    Basophils, Absolute 0.03 0.00 - 0.20 10*3/mm3    Immature Grans, Absolute 0.04 0.00 - 0.05 10*3/mm3    nRBC 0.0 0.0 - 0.2 /100 WBC       Ordered the above labs and independently reviewed the results.      RADIOLOGY  Xr Chest 1 View    Result Date: 7/16/2020  XR CHEST 1 VW-  HISTORY: Male who is 45 years-old,  chest pain  TECHNIQUE: Frontal views of the chest  COMPARISON: 01/06/2018  FINDINGS: Heart, mediastinum and pulmonary vasculature are unremarkable. No focal pulmonary consolidation, pleural effusion, or pneumothorax. No acute osseous process.      No evidence for acute pulmonary process. Follow-up as clinical indications persist.  This report was finalized on 7/16/2020 2:46 PM by Dr. Dave Lemos M.D.        I ordered the above noted radiological studies and viewed the images on the PACS system.       EKG      Independently viewed by me and interpreted by Dr Méndez         MEDICAL RECORD REVIEW  Medical records reviewed in [facility], patient had a cardiac cath done December 17, 2017:  FINDINGS:     1. HEMODYNAMICS:  LV 80/12, AO 80/52/60.     2. LEFT VENTRICULOGRAPHY: EF 60%, no mitral regurgitation.  Mild inferior hypokinesis.     3. CORONARY ANGIOGRAPHY: Right dominant system, one-vessel coronary disease.  The left main is normal.  The proximal LAD has 50% stenosis.  The  mid to distal LAD has diffuse 50% stenosis.  The ramus is a very large branch and is normal.  The circumflex is moderate in size and has 20% distal stenosis.  The RCA is large and dominant.  The proximal vessel has up to 90% stenosis.  This is a long lesion.  The mid vessel is normal.  The distal vessel is totally occluded.       4. INTERVENTIONAL COMMENTS:  Successful thrombectomy/LAUREN of the proximal and distal RCA was performed as described above.     SUMMARY: Acute inferior STEMI complicated by VF, cardiogenic shock, and bradycardia, successfully treated with thrombectomy/LAUREN of the RCA.     RECOMMENDATIONS: Routine post MI care.    PROCEDURES    Procedures    HEART SCORE    History Moderately suspicious (1)  ECG Nonspecific repol disturbance (1)  Age < or = 45 (0)  Risk factors > or = to 3 RF for atherosclerotic dx (2)  Troponin < or = Normal limit (0)    This patient's HEART score is 4    HEART Score Key:  Scores 0-3: 0.9-1.7% risk of adverse cardiac event. In the HEART Score study, these patients were discharged (0.99% in the retrospective study, 1.7% in the prospective study)  Scores 4-6: 12-16.6% risk of adverse cardiac event. In the HEART Score study, these patients were admitted to the hospital. (11.6% retrospective, 16.6% prospective)  Scores ?7: 50-65% risk of adverse cardiac event. In the HEART Score study, these patients were candidates for early invasive measures. (65.2% retrospective, 50.1% prospective)        DIFFERENTIAL DIAGNOSIS  Differential diagnosis for chest pain include but are not limited to the following:  Anxiety, muscle strain, costochondritis, pleurisy, herpes zoster, MI, ACS, Aortic dissection, PE, pneumonia, pneumothorax, GERD        PROGRESS, DATA ANALYSIS, CONSULTS, AND MEDICAL DECISION MAKING    PPE: Patient was placed in face mask in first look. Patient was wearing facemask when I entered the room and throughout our encounter. I wore full protective equipment throughout this  patient encounter including a face mask, and gloves. Hand hygiene was performed before donning protective equipment and after removal when leaving the room.        ED Course as of Jul 16 1625   Thu Jul 16, 2020   1419 Discussed pertinent information from history and physical exam with patient.  Discussed differential diagnosis and plan for ED evaluation/work-up and treatment including chest x-ray, EKG and labs.  All questions answered.  Patient is agreeable with this plan.        [MS]   1533 Reviewed pt's history and workup with Dr. Méndez.  After a bedside evaluation, they agree with the plan of care.          [MS]   1621 Consult Note    Discussed care with Dr Padilla  Reviewed patient's history, exam, results and need for admission secondary to Chest pain and Hypertension  Dr. Padilla accepts the patient to be admitted to [facility] observation bed.        [MS]   1624 Patient updated on conversation with Dr. Palacios, states he is having a little bit of mild chest pressure rates it at a 1-2 and a mild headache.  He states he has not taken an aspirin today.  We will order aspirin and a small dose of morphine and Zofran for his pain.    [MS]      ED Course User Index  [MS] Cherelle Dooley, STANLEY     ADMISSION    Discussed treatment plan and reason for admission with pt/family and admitting physician.  Pt/family voiced understanding of the plan for admission for further testing/treatment as needed.      DIAGNOSIS  Final diagnoses:   Chest pain, unspecified type   Essential hypertension           MEDICATIONS GIVEN IN ED    Medications   sodium chloride 0.9 % flush 10 mL (has no administration in time range)   aspirin tablet 325 mg (has no administration in time range)   morphine injection 4 mg (has no administration in time range)   ondansetron (ZOFRAN) injection 4 mg (has no administration in time range)   cloNIDine (CATAPRES) tablet 0.1 mg (0.1 mg Oral Given 7/16/20 1422)           COURSE & MEDICAL  DECISION MAKING  Any/All labs and Any/All Imaging studies that were ordered were reviewed and are noted above.  Results were reviewed/discussed with the patient and they were also made aware of online assess.   Pt also made aware that some labs, such as cultures, will not be resulted during ER visit and follow up with PMD is necessary.        Cherelle Dooley, APRN  07/16/20 1414

## 2020-07-16 NOTE — ED TRIAGE NOTES
Pt complains of chest pains that started 3/4 days ago. States that he had an MI in 2017 with stent placement. States that his father recentlyy passed away and that he has been under more stress. Pt denies N/V and SOA. Pt masked at arrival and triage staff wore all appropriate PPE.

## 2020-07-16 NOTE — ED NOTES
"Pt to ER complaining of chest pressure x 2-3 weeks.  Pt states he has been of his medications since around March.  States he lost insurance coverage and then had to take care of his sick father \"so I guess I neglected myself\".  Pt states he had an MI in 2017, had 2 cardiac stents placed by Dr Sahu.  Pt states he recently got insurance coverage back and spoke with cardiology office and has follow up appt to get new prescriptions.  Pt states his pharmacy gave him 5 days worth of Plavix and Coreg today.  He took one dose of each.  Pt wearing mask. This nurse wearing gown, gloves, goggles, and mask upon entering room and throughout procedure       Darian Rajan RN  07/16/20 8102    "

## 2020-07-17 ENCOUNTER — APPOINTMENT (OUTPATIENT)
Dept: NUCLEAR MEDICINE | Facility: HOSPITAL | Age: 45
End: 2020-07-17

## 2020-07-17 ENCOUNTER — READMISSION MANAGEMENT (OUTPATIENT)
Dept: CALL CENTER | Facility: HOSPITAL | Age: 45
End: 2020-07-17

## 2020-07-17 ENCOUNTER — APPOINTMENT (OUTPATIENT)
Dept: CARDIOLOGY | Facility: HOSPITAL | Age: 45
End: 2020-07-17

## 2020-07-17 VITALS
DIASTOLIC BLOOD PRESSURE: 100 MMHG | HEIGHT: 69 IN | SYSTOLIC BLOOD PRESSURE: 148 MMHG | RESPIRATION RATE: 16 BRPM | WEIGHT: 242 LBS | OXYGEN SATURATION: 98 % | HEART RATE: 80 BPM | TEMPERATURE: 98 F | BODY MASS INDEX: 35.84 KG/M2

## 2020-07-17 LAB
ANION GAP SERPL CALCULATED.3IONS-SCNC: 7.9 MMOL/L (ref 5–15)
AORTIC DIMENSIONLESS INDEX: 0.8 (DI)
ARTICHOKE IGE QN: 125 MG/DL (ref 0–100)
BH CV ECHO MEAS - AO MAX PG (FULL): 1.6 MMHG
BH CV ECHO MEAS - AO MAX PG: 5 MMHG
BH CV ECHO MEAS - AO MEAN PG (FULL): 1 MMHG
BH CV ECHO MEAS - AO MEAN PG: 3 MMHG
BH CV ECHO MEAS - AO ROOT AREA (BSA CORRECTED): 1.6
BH CV ECHO MEAS - AO ROOT AREA: 9.6 CM^2
BH CV ECHO MEAS - AO ROOT DIAM: 3.5 CM
BH CV ECHO MEAS - AO V2 MAX: 112 CM/SEC
BH CV ECHO MEAS - AO V2 MEAN: 78.9 CM/SEC
BH CV ECHO MEAS - AO V2 VTI: 21 CM
BH CV ECHO MEAS - AVA(I,A): 2.9 CM^2
BH CV ECHO MEAS - AVA(I,D): 2.9 CM^2
BH CV ECHO MEAS - AVA(V,A): 3.2 CM^2
BH CV ECHO MEAS - AVA(V,D): 3.2 CM^2
BH CV ECHO MEAS - BSA(HAYCOCK): 2.4 M^2
BH CV ECHO MEAS - BSA: 2.2 M^2
BH CV ECHO MEAS - BZI_BMI: 35.7 KILOGRAMS/M^2
BH CV ECHO MEAS - BZI_METRIC_HEIGHT: 175.3 CM
BH CV ECHO MEAS - BZI_METRIC_WEIGHT: 109.8 KG
BH CV ECHO MEAS - EDV(CUBED): 74.1 ML
BH CV ECHO MEAS - EDV(MOD-SP2): 117 ML
BH CV ECHO MEAS - EDV(MOD-SP4): 107 ML
BH CV ECHO MEAS - EDV(TEICH): 78.6 ML
BH CV ECHO MEAS - EF(CUBED): 59.8 %
BH CV ECHO MEAS - EF(MOD-BP): 50.4 %
BH CV ECHO MEAS - EF(MOD-SP2): 49.6 %
BH CV ECHO MEAS - EF(MOD-SP4): 49.5 %
BH CV ECHO MEAS - EF(TEICH): 51.7 %
BH CV ECHO MEAS - ESV(CUBED): 29.8 ML
BH CV ECHO MEAS - ESV(MOD-SP2): 59 ML
BH CV ECHO MEAS - ESV(MOD-SP4): 54 ML
BH CV ECHO MEAS - ESV(TEICH): 37.9 ML
BH CV ECHO MEAS - FS: 26.2 %
BH CV ECHO MEAS - IVS/LVPW: 0.69
BH CV ECHO MEAS - IVSD: 0.9 CM
BH CV ECHO MEAS - LAT PEAK E' VEL: 8.5 CM/SEC
BH CV ECHO MEAS - LV DIASTOLIC VOL/BSA (35-75): 47.8 ML/M^2
BH CV ECHO MEAS - LV MASS(C)D: 157.1 GRAMS
BH CV ECHO MEAS - LV MASS(C)DI: 70.1 GRAMS/M^2
BH CV ECHO MEAS - LV MAX PG: 3.5 MMHG
BH CV ECHO MEAS - LV MEAN PG: 2 MMHG
BH CV ECHO MEAS - LV SYSTOLIC VOL/BSA (12-30): 24.1 ML/M^2
BH CV ECHO MEAS - LV V1 MAX: 93.1 CM/SEC
BH CV ECHO MEAS - LV V1 MEAN: 61 CM/SEC
BH CV ECHO MEAS - LV V1 VTI: 16.2 CM
BH CV ECHO MEAS - LVIDD: 4.2 CM
BH CV ECHO MEAS - LVIDS: 3.1 CM
BH CV ECHO MEAS - LVLD AP2: 9 CM
BH CV ECHO MEAS - LVLD AP4: 8.5 CM
BH CV ECHO MEAS - LVLS AP2: 7.6 CM
BH CV ECHO MEAS - LVLS AP4: 7.4 CM
BH CV ECHO MEAS - LVOT AREA (M): 3.8 CM^2
BH CV ECHO MEAS - LVOT AREA: 3.8 CM^2
BH CV ECHO MEAS - LVOT DIAM: 2.2 CM
BH CV ECHO MEAS - LVPWD: 1.3 CM
BH CV ECHO MEAS - MED PEAK E' VEL: 7.7 CM/SEC
BH CV ECHO MEAS - MV A MAX VEL: 72.8 CM/SEC
BH CV ECHO MEAS - MV DEC SLOPE: 249 CM/SEC^2
BH CV ECHO MEAS - MV DEC TIME: 235 SEC
BH CV ECHO MEAS - MV E MAX VEL: 78.6 CM/SEC
BH CV ECHO MEAS - MV E/A: 1.1
BH CV ECHO MEAS - MV MAX PG: 1.9 MMHG
BH CV ECHO MEAS - MV MEAN PG: 1 MMHG
BH CV ECHO MEAS - MV P1/2T MAX VEL: 70 CM/SEC
BH CV ECHO MEAS - MV P1/2T: 82.3 MSEC
BH CV ECHO MEAS - MV V2 MAX: 68.5 CM/SEC
BH CV ECHO MEAS - MV V2 MEAN: 49.2 CM/SEC
BH CV ECHO MEAS - MV V2 VTI: 19.4 CM
BH CV ECHO MEAS - MVA P1/2T LCG: 3.1 CM^2
BH CV ECHO MEAS - MVA(P1/2T): 2.7 CM^2
BH CV ECHO MEAS - MVA(VTI): 3.2 CM^2
BH CV ECHO MEAS - RAP SYSTOLE: 3 MMHG
BH CV ECHO MEAS - SI(AO): 90.2 ML/M^2
BH CV ECHO MEAS - SI(CUBED): 19.8 ML/M^2
BH CV ECHO MEAS - SI(LVOT): 27.5 ML/M^2
BH CV ECHO MEAS - SI(MOD-SP2): 25.9 ML/M^2
BH CV ECHO MEAS - SI(MOD-SP4): 23.7 ML/M^2
BH CV ECHO MEAS - SI(TEICH): 18.2 ML/M^2
BH CV ECHO MEAS - SV(AO): 202 ML
BH CV ECHO MEAS - SV(CUBED): 44.3 ML
BH CV ECHO MEAS - SV(LVOT): 61.6 ML
BH CV ECHO MEAS - SV(MOD-SP2): 58 ML
BH CV ECHO MEAS - SV(MOD-SP4): 53 ML
BH CV ECHO MEAS - SV(TEICH): 40.7 ML
BH CV ECHO MEAS - TAPSE (>1.6): 2.1 CM
BH CV ECHO MEASUREMENTS AVERAGE E/E' RATIO: 9.7
BH CV STRESS BP STAGE 1: NORMAL
BH CV STRESS BP STAGE 2: NORMAL
BH CV STRESS BP STAGE 3: NORMAL
BH CV STRESS DURATION MIN STAGE 1: 3
BH CV STRESS DURATION MIN STAGE 2: 3
BH CV STRESS DURATION MIN STAGE 3: 2
BH CV STRESS DURATION SEC STAGE 1: 0
BH CV STRESS DURATION SEC STAGE 2: 0
BH CV STRESS DURATION SEC STAGE 3: 19
BH CV STRESS GRADE STAGE 1: 10
BH CV STRESS GRADE STAGE 2: 12
BH CV STRESS GRADE STAGE 3: 14
BH CV STRESS HR STAGE 1: 114
BH CV STRESS HR STAGE 2: 133
BH CV STRESS HR STAGE 3: 150
BH CV STRESS METS STAGE 1: 5
BH CV STRESS METS STAGE 2: 7.5
BH CV STRESS METS STAGE 3: 10
BH CV STRESS PROTOCOL 1: NORMAL
BH CV STRESS RECOVERY BP: NORMAL MMHG
BH CV STRESS RECOVERY HR: 103 BPM
BH CV STRESS SPEED STAGE 1: 1.7
BH CV STRESS SPEED STAGE 2: 2.5
BH CV STRESS SPEED STAGE 3: 3.4
BH CV STRESS STAGE 1: 1
BH CV STRESS STAGE 2: 2
BH CV STRESS STAGE 3: 3
BH CV XLRA - TDI S': 11 CM/SEC
BUN SERPL-MCNC: 15 MG/DL (ref 6–20)
BUN/CREAT SERPL: 16 (ref 7–25)
CALCIUM SPEC-SCNC: 9.3 MG/DL (ref 8.6–10.5)
CHLORIDE SERPL-SCNC: 103 MMOL/L (ref 98–107)
CHOLEST SERPL-MCNC: 205 MG/DL (ref 0–200)
CO2 SERPL-SCNC: 27.1 MMOL/L (ref 22–29)
CREAT SERPL-MCNC: 0.94 MG/DL (ref 0.76–1.27)
DEPRECATED RDW RBC AUTO: 45.5 FL (ref 37–54)
ERYTHROCYTE [DISTWIDTH] IN BLOOD BY AUTOMATED COUNT: 14.9 % (ref 12.3–15.4)
GFR SERPL CREATININE-BSD FRML MDRD: 87 ML/MIN/1.73
GLUCOSE SERPL-MCNC: 100 MG/DL (ref 65–99)
HCT VFR BLD AUTO: 40.7 % (ref 37.5–51)
HDLC SERPL-MCNC: 26 MG/DL (ref 40–60)
HGB BLD-MCNC: 13.3 G/DL (ref 13–17.7)
LDLC SERPL CALC-MCNC: ABNORMAL MG/DL
LDLC/HDLC SERPL: ABNORMAL {RATIO}
LEFT ATRIUM VOLUME INDEX: 13 ML/M2
LV EF NUC BP: 67 %
MAXIMAL PREDICTED HEART RATE: 176 BPM
MAXIMAL PREDICTED HEART RATE: 176 BPM
MCH RBC QN AUTO: 27.8 PG (ref 26.6–33)
MCHC RBC AUTO-ENTMCNC: 32.7 G/DL (ref 31.5–35.7)
MCV RBC AUTO: 85 FL (ref 79–97)
PERCENT MAX PREDICTED HR: 85.23 %
PLATELET # BLD AUTO: 199 10*3/MM3 (ref 140–450)
PMV BLD AUTO: 11.1 FL (ref 6–12)
POTASSIUM SERPL-SCNC: 4.9 MMOL/L (ref 3.5–5.2)
RBC # BLD AUTO: 4.79 10*6/MM3 (ref 4.14–5.8)
SODIUM SERPL-SCNC: 138 MMOL/L (ref 136–145)
STRESS BASELINE BP: NORMAL MMHG
STRESS BASELINE HR: 81 BPM
STRESS PERCENT HR: 100 %
STRESS POST ESTIMATED WORKLOAD: 9.3 METS
STRESS POST EXERCISE DUR MIN: 8 MIN
STRESS POST EXERCISE DUR SEC: 20 SEC
STRESS POST PEAK BP: NORMAL MMHG
STRESS POST PEAK HR: 150 BPM
STRESS TARGET HR: 150 BPM
STRESS TARGET HR: 150 BPM
TRIGL SERPL-MCNC: 423 MG/DL (ref 0–150)
TROPONIN T SERPL-MCNC: <0.01 NG/ML (ref 0–0.03)
VLDLC SERPL-MCNC: ABNORMAL MG/DL
WBC # BLD AUTO: 8.54 10*3/MM3 (ref 3.4–10.8)

## 2020-07-17 PROCEDURE — 93005 ELECTROCARDIOGRAM TRACING: CPT | Performed by: INTERNAL MEDICINE

## 2020-07-17 PROCEDURE — 84484 ASSAY OF TROPONIN QUANT: CPT | Performed by: INTERNAL MEDICINE

## 2020-07-17 PROCEDURE — 85027 COMPLETE CBC AUTOMATED: CPT | Performed by: INTERNAL MEDICINE

## 2020-07-17 PROCEDURE — 93306 TTE W/DOPPLER COMPLETE: CPT

## 2020-07-17 PROCEDURE — G0378 HOSPITAL OBSERVATION PER HR: HCPCS

## 2020-07-17 PROCEDURE — 80061 LIPID PANEL: CPT | Performed by: INTERNAL MEDICINE

## 2020-07-17 PROCEDURE — A9500 TC99M SESTAMIBI: HCPCS | Performed by: INTERNAL MEDICINE

## 2020-07-17 PROCEDURE — 93306 TTE W/DOPPLER COMPLETE: CPT | Performed by: INTERNAL MEDICINE

## 2020-07-17 PROCEDURE — 93017 CV STRESS TEST TRACING ONLY: CPT

## 2020-07-17 PROCEDURE — 99236 HOSP IP/OBS SAME DATE HI 85: CPT | Performed by: NURSE PRACTITIONER

## 2020-07-17 PROCEDURE — 93010 ELECTROCARDIOGRAM REPORT: CPT | Performed by: INTERNAL MEDICINE

## 2020-07-17 PROCEDURE — 93016 CV STRESS TEST SUPVJ ONLY: CPT | Performed by: INTERNAL MEDICINE

## 2020-07-17 PROCEDURE — 78452 HT MUSCLE IMAGE SPECT MULT: CPT

## 2020-07-17 PROCEDURE — 93018 CV STRESS TEST I&R ONLY: CPT | Performed by: INTERNAL MEDICINE

## 2020-07-17 PROCEDURE — 0 TECHNETIUM SESTAMIBI: Performed by: INTERNAL MEDICINE

## 2020-07-17 PROCEDURE — 80048 BASIC METABOLIC PNL TOTAL CA: CPT | Performed by: INTERNAL MEDICINE

## 2020-07-17 PROCEDURE — 78452 HT MUSCLE IMAGE SPECT MULT: CPT | Performed by: INTERNAL MEDICINE

## 2020-07-17 PROCEDURE — 83721 ASSAY OF BLOOD LIPOPROTEIN: CPT | Performed by: INTERNAL MEDICINE

## 2020-07-17 RX ORDER — PANTOPRAZOLE SODIUM 40 MG/1
40 TABLET, DELAYED RELEASE ORAL DAILY
Qty: 60 TABLET | Refills: 2 | Status: SHIPPED | OUTPATIENT
Start: 2020-07-17 | End: 2020-07-24

## 2020-07-17 RX ADMIN — TECHNETIUM TC 99M SESTAMIBI 1 DOSE: 1 INJECTION INTRAVENOUS at 10:15

## 2020-07-17 RX ADMIN — CLOPIDOGREL 75 MG: 75 TABLET, FILM COATED ORAL at 07:38

## 2020-07-17 RX ADMIN — PANTOPRAZOLE SODIUM 40 MG: 40 TABLET, DELAYED RELEASE ORAL at 07:38

## 2020-07-17 RX ADMIN — VITAMIN D, TAB 1000IU (100/BT) 1000 UNITS: 25 TAB at 13:22

## 2020-07-17 RX ADMIN — ASPIRIN 81 MG: 81 TABLET, CHEWABLE ORAL at 07:38

## 2020-07-17 RX ADMIN — FERROUS SULFATE TAB 325 MG (65 MG ELEMENTAL FE) 325 MG: 325 (65 FE) TAB at 07:38

## 2020-07-17 RX ADMIN — HYDROCODONE BITARTRATE AND ACETAMINOPHEN 1 TABLET: 7.5; 325 TABLET ORAL at 13:22

## 2020-07-17 RX ADMIN — SODIUM CHLORIDE, PRESERVATIVE FREE 10 ML: 5 INJECTION INTRAVENOUS at 07:39

## 2020-07-17 RX ADMIN — CARVEDILOL 6.25 MG: 6.25 TABLET, FILM COATED ORAL at 07:38

## 2020-07-17 RX ADMIN — TECHNETIUM TC 99M SESTAMIBI 1 DOSE: 1 INJECTION INTRAVENOUS at 12:05

## 2020-07-17 RX ADMIN — HYDROCODONE BITARTRATE AND ACETAMINOPHEN 1 TABLET: 7.5; 325 TABLET ORAL at 07:38

## 2020-07-17 NOTE — H&P
Date of Hospital Visit: 20  Encounter Provider: STANLEY Jacques  Place of Service: [facility] CARDIOLOGY  Patient Name: Ruddy Griffin  :1975  [id number]  Referral Provider: No ref. provider found    Chief complaint: Chest pain    History of Present Illness: Ruddy Griffin is a [age]-year-old male who was a previous Dr. Sahu patient.  In  he came to the hospital with an acute inferior ST elevation MI complicated by V. fib arrest and cardiogenic shock as well as bradycardia.  He underwent successful treatment with thrombectomy and drug-eluting stent placement to the right coronary.  Luckily his EF was normal and he had some mild hypokinesis of the inferior wall.  Other anatomy showed a 50% proximal LAD 50% mid to distal, normal ramus, 20% distal circumflex.  He has risk factors for CAD such as hypertension, obesity and hyperlipidemia.    Over the last several months patient has been having a very difficult time.  Since March he is lost his work and does not have health insurance.  Therefore he has not been able to take any of his medications including his blood pressure medicines.  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  His father  about a week ago and since that time he is not been feeling well.  He has had intermittent headache and describes an intermittent pressure sensation in the substernal chest.  It does not appear to come on with exertion.  His blood pressure was elevated on admission and his antihypertensives were restarted.  His EKG has been unremarkable for acute MI and his cardiac enzymes are all normal.  His cholesterol was elevated as well as his triglycerides.  His pain has resolved.    Past Medical History:   Diagnosis Date   • Coronary artery disease    • History of transfusion    • Hyperlipidemia    • Hypertension    • Lumbar spondylosis 2018   • ST elevation myocardial infarction (STEMI) (CMS/[facility]) 2017            Past Surgical History:   Procedure Laterality Date   • APPENDECTOMY  2003   • CARDIAC CATHETERIZATION N/A 12/17/2017    Procedure: Left Heart Cath;  Surgeon: Nick Sahu MD;  Location:  [facility] CATH INVASIVE LOCATION;  Service:    • CARDIAC CATHETERIZATION N/A 12/17/2017    Procedure: Stent LAUREN coronary;  Surgeon: Nick Sahu MD;  Location:  [facility] CATH INVASIVE LOCATION;  Service:    • CARDIAC CATHETERIZATION  12/17/2017    Procedure: Percutaneous Manual Thrombectomy;  Surgeon: Nick Sahu MD;  Location:  [facility] CATH INVASIVE LOCATION;  Service:    • CORONARY STENT PLACEMENT  12/17/2017    x 2   • ENDOSCOPY  01/07/2018    (02:41 PM) Hiatal hernia, non-bleeding gastric ulcer with a visible vessel, injected, clip, duodenitis   • ENDOSCOPY N/A 1/7/2018    Procedure: ESOPHAGOGASTRODUODENOSCOPY;  Surgeon: Francesca Pascual MD;  Location: [facility] MAIN OR;  Service:    • JOINT REPLACEMENT         Prior to Admission medications    Medication Sig Start Date End Date Taking? Authorizing Provider   HYDROcodone-acetaminophen (NORCO) 7.5-325 MG per tablet Take 1 tablet by mouth Every 6 (Six) Hours As Needed for Moderate Pain  for up to 12 doses. 8/15/18  Yes Luanne Dumont MD   aspirin 81 MG chewable tablet CHEW AND SWALLOW 1 TABLET BY MOUTH DAILY 7/5/18   Luis Alberto, MD Nick   atorvastatin (LIPITOR) 20 MG tablet TAKE 1 TABLET BY MOUTH DAILY 4/5/19   Luis Alberto, MD Nick   carvedilol (COREG) 6.25 MG tablet Take 1 tablet by mouth 2 (Two) Times a Day. 7/15/20   Adryan Guillaume MD   cholecalciferol (VITAMIN D3) 1000 units tablet Take 1,000 Units by mouth Daily.    ProviderKevyn MD   clopidogrel (PLAVIX) 75 MG tablet Take 1 tablet by mouth Daily. 7/15/20   Adryan Guillaume MD   ferrous sulfate 324 (65 Fe) MG tablet delayed-release EC tablet Take 324 mg by mouth Daily With Breakfast.    Kevyn Camp MD        Allergies as of 07/16/2020   • (No Known Allergies)       Social History     Socioeconomic History   • Marital  status: Single     Spouse name: Not on file   • Number of children: Not on file   • Years of education: Not on file   • Highest education level: Not on file   Occupational History   • Occupation: unemployed   Tobacco Use   • Smoking status: Former Smoker     Packs/day: 1.00     Years: 15.00     Pack years: 15.00     Types: Cigarettes     Last attempt to quit: 2017     Years since quittin.5   • Smokeless tobacco: Never Used   Substance and Sexual Activity   • Alcohol use: No   • Drug use: No     Comment: occ   • Sexual activity: Yes     Partners: Female       Family History   Problem Relation Age of Onset   • Heart disease Mother    • Alcohol abuse Mother    • Heart disease Father    • Heart attack Father    • Diabetes Father    • Cancer Father         prostate   • Heart attack Sister    • Heart disease Sister    • Depression Sister    • Depression Sister        REVIEW OF SYSTEMS:        CONSTITUTIONAL:  No weight loss. No fever or chills. No weakness. No fatigue.                           HEENT:  Normocephalic. Symmetrical. Atraumatic.                               Eyes: No visual loss. No blurred vision. No double vision. No icterus.                               Ears: No hearing loss. No drainage.                              Nose:  No sneezing. No congestion. No runny nose. No sore throat.           CARDIAC:         + chest pain, palpitations, syncope or near syncope             RESPIRATORY:  No shortness of breath. No hemoptysis. No cough or sputum production. No wheezes.   GASTROINTESTINAL:         No anorexia. No nausea. No vomiting. No diarrhea. No abdominal pain. No distention. No bleeding from rectum. No melena.         GENITOURINARY:  No burning on urination. No hematuria. No frequency.  MUSCULOSKELETAL: No myalgia. No stiffness. No gait disturbances.                              SKIN:  No rash. No itching. No pallor.           NEUROLOGICAL: No headache. No dizziness. No syncope. No paralysis. No  "ataxia. No numbness. No tingling. No change in bowel or bladder control. No speech difficulty.            HEMATOLOGIC: No bleeding. No bruising.                PSYCHIATRIC: No depression. No anxiety. No confusion. No sleep disturbances. No hallucinations.       ENDOCRINOLOGIC: No diaphoresis. No heat or cold intolerance. No polyuria or polydipsia.        Objective:   Temp:  [98 °F (36.7 °C)-98.7 °F (37.1 °C)] 98 °F (36.7 °C)  Heart Rate:  [] 83  Resp:  [16-18] 16  BP: (119-178)/() 131/95  Body mass index is 35.78 kg/m².  Flowsheet Rows      First Filed Value   Admission Height  175.3 cm (69\") Documented at 07/16/2020 1335   Admission Weight  104 kg (230 lb) Documented at 07/16/2020 1335        Vitals:    07/17/20 0719   BP: 131/95   Pulse: 83   Resp: 16   Temp: 98 °F (36.7 °C)   SpO2: 97%       Physical Exam:   General Appearance:    Awake alert and oriented in no acute distress.   Color:  Skin:  Neuro:  HEENT:    Lungs:     Pink  Warm and dry  No focal, motor or sensory deficits  Neck supple, pupils equal, round and reactive. No JVD, No Bruit  Clear to auscultation,respirations regular, even and                  unlabored    Heart:    Regular rate and rhythm, S1 and S2, no murmur, no gallop, no rub. No edema, DP/PT pulses are 2+   Chest Wall:    No abnormalities observed   Abdomen:     Normal bowel sounds, no masses, no organomegaly, soft        non-tender, non-distended, no guarding, no ascites noted   Extremities:   Moves all extremities well, no edema, no cyanosis, no redness               I personally viewed and interpreted the patient's EKG/Telemetry data      CXR: IMPRESSION:  No evidence for acute pulmonary process. Follow-up as  clinical indications persist.       Assessment:  Active Hospital Problems    Diagnosis  POA   • Chest pain [R07.9]  Yes      Resolved Hospital Problems   No resolved problems to display.     1.  Atypical chest pain  2.  History of coronary disease with ST elevation MI " and stent to the RCA in 2017  3.  Uncontrolled hypertension  4.  Hyperlipidemia      Plan: Patient symptoms are atypical his EKG is stable cardiac enzymes have all been negative.  EKG interpretation says there is some ST elevation but I just see some nonspecific changes.  He is pain-free at this time his blood pressure is improved with the initiation of medications.  We will plan on exercise Cardiolite stress test today as well as echocardiogram.  If this is negative we will give him some prescriptions for generic blood pressure medicines that he cannot afford and have him follow-up in the office.    STANLEY Jacques  07/17/20  10:46.  Electronically signed by STANLEY Jacques, 07/17/20, 10:46 AM.

## 2020-07-17 NOTE — PLAN OF CARE
Problem: Patient Care Overview  Goal: Plan of Care Review  Outcome: Ongoing (interventions implemented as appropriate)  Flowsheets (Taken 7/17/2020 0257)  Progress: improving  Plan of Care Reviewed With: patient  Outcome Summary: Pt. admitted yesterday 7/16/2020 for chest pain. Running NSR on heart monitor. NPO since midnight for possible stress test this morning. No complaints of chest pain at this time. Will continue to monitor.

## 2020-07-17 NOTE — DISCHARGE SUMMARY
Patient Name: Ruddy Griffin  :1975  45 y.o.    Date of Admit: 2020  Date of Discharge:  2020    Discharge Diagnosis:  Problem List Items Addressed This Visit        Cardiovascular and Mediastinum    Essential hypertension       Nervous and Auditory    Chest pain - Primary        1.  Atypical chest pain  2.  History of coronary disease with ST elevation MI and stent to the RCA in 2017  3.  Uncontrolled hypertension  4.  Hyperlipidemia      Hospital Course:   Ruddy Griffin is a 41-year-old male who was a previous Dr. Sahu patient.  In  he came to the hospital with an acute inferior ST elevation MI complicated by V. fib arrest and cardiogenic shock as well as bradycardia.  He underwent successful treatment with thrombectomy and drug-eluting stent placement to the right coronary.  Luckily his EF was normal and he had some mild hypokinesis of the inferior wall.  Other anatomy showed a 50% proximal LAD 50% mid to distal, normal ramus, 20% distal circumflex.  He has risk factors for CAD such as hypertension, obesity and hyperlipidemia.     Over the last several months patient has been having a very difficult time.  Since March he is lost his work and does not have health insurance.  Therefore he has not been able to take any of his medications including his blood pressure medicines.  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  His father  about a week ago and since that time he is not been feeling well.  He has had intermittent headache and describes an intermittent pressure sensation in the substernal chest.  It does not appear to come on with exertion.  His blood pressure was elevated on admission and his antihypertensives were restarted.  His EKG has been unremarkable for acute MI and his cardiac enzymes are all normal.  His cholesterol was elevated as well as his triglycerides.  His pain has resolved.  His chest x-ray was unremarkable.  I sent him for Cardiolite stress  test today that showed previous infarct but no ischemia.  It also noted a lesion in the right upper chest which we will get outpatient evaluation on.  Echocardiogram showed some asymmetric hypertrophy with normal EF and no significant valvular heart disease.    Procedures Performed         Consults     Date and Time Order Name Status Description    7/16/2020 1533 Cardiology (on-call MD unless specified) Completed           Pertinent Test Results:   Results from last 7 days   Lab Units 07/17/20  0439 07/16/20  1422   SODIUM mmol/L 138 138   POTASSIUM mmol/L 4.9 4.5   CHLORIDE mmol/L 103 100   CO2 mmol/L 27.1 26.4   BUN mg/dL 15 15   CREATININE mg/dL 0.94 0.94   CALCIUM mg/dL 9.3 10.2   BILIRUBIN mg/dL  --  0.3   ALK PHOS U/L  --  73   ALT (SGPT) U/L  --  48*   AST (SGOT) U/L  --  20   GLUCOSE mg/dL 100* 184*     Results from last 7 days   Lab Units 07/17/20  0439 07/16/20  1839 07/16/20  1422   TROPONIN T ng/mL <0.010 <0.010 <0.010     @LABRCNT(bnp)@  Results from last 7 days   Lab Units 07/17/20  0439   WBC 10*3/mm3 8.54   HEMOGLOBIN g/dL 13.3   HEMATOCRIT % 40.7   PLATELETS 10*3/mm3 199             Results from last 7 days   Lab Units 07/17/20  0439   CHOLESTEROL mg/dL 205*   TRIGLYCERIDES mg/dL 423*   HDL CHOL mg/dL 26*   LDL CHOL mg/dL 125*       Condition on Discharge: stable    Discharge Medications     Discharge Medications      Continue These Medications      Instructions Start Date   aspirin 81 MG chewable tablet   CHEW AND SWALLOW 1 TABLET BY MOUTH DAILY      atorvastatin 20 MG tablet  Commonly known as:  LIPITOR   20 mg, Oral, Daily      carvedilol 6.25 MG tablet  Commonly known as:  COREG   6.25 mg, Oral, 2 Times Daily      cholecalciferol 25 MCG (1000 UT) tablet  Commonly known as:  VITAMIN D3   1,000 Units, Oral, Daily      clopidogrel 75 MG tablet  Commonly known as:  PLAVIX   75 mg, Oral, Daily      ferrous sulfate 324 (65 Fe) MG tablet delayed-release EC tablet   324 mg, Oral, Daily With Breakfast       HYDROcodone-acetaminophen 7.5-325 MG per tablet  Commonly known as:  NORCO   1 tablet, Oral, Every 6 Hours PRN      pantoprazole 40 MG EC tablet  Commonly known as:  PROTONIX   40 mg, Oral, Daily             Discharge Diet:     Activity at Discharge:     Discharge disposition: home    Follow-up Appointments  Future Appointments   Date Time Provider Department Center   7/20/2020  8:30 AM Adryan Guillaume MD [id number] None         Test Results Pending at Discharge       STANLEY Jacques, [facility]  07/17/20  15:05    Time: Discharge 30 min  Electronically signed by STANLEY Jacuqes, 07/17/20, 3:05 PM.

## 2020-07-17 NOTE — PROGRESS NOTES
Discharge Planning Assessment  [facility]     Patient Name: Ruddy Griffin  MRN: [id number]  Today's Date: 2020    Admit Date: 2020    Discharge Needs Assessment     Row Name 20 1601       Living Environment    Lives With  parent(s)    Current Living Arrangements  home/apartment/condo    Primary Care Provided by  self    Provides Primary Care For  no one    Family Caregiver if Needed  parent(s);sibling(s)    Family Caregiver Names  -- sister: Kimberlee Daly, step-mother Ngoc Bond    Quality of Family Relationships  supportive    Able to Return to Prior Arrangements  yes       Resource/Environmental Concerns    Resource/Environmental Concerns  none    Transportation Concerns  car, none       Transition Planning    Patient/Family Anticipates Transition to  home with family    Patient/Family Anticipated Services at Transition  none    Transportation Anticipated  family or friend will provide       Discharge Needs Assessment    Readmission Within the Last 30 Days  no previous admission in last 30 days    Concerns to be Addressed  no discharge needs identified    Equipment Currently Used at Home  none    Anticipated Changes Related to Illness  none    Equipment Needed After Discharge  none        Discharge Plan     Row Name 20 3801       Plan    Plan  Home w/o needs    Provided Post Acute Provider List?  Refused    Refused Provider List Comment  -- pt denies need for HH / SNF list    Plan Comments  I met with pt, his step-mother Ngoc Ferguson called while I was with pt and he put the call on speaker phone and advised I could speak with Ngoc on the phone.  Pt confirmed the address is correct, he said he lives with Ngoc and was his father's caregiven until he  earlier this month.  Pt said he has not taken his Rx since March, he said he did not know he had to update Passport and his medicaid , he said he has since updated Medicaid and was told he would be eligible for  coverage.   Pt said he does not have a PCP and was agreeable for me to get him an appointment and prefers a PCP in [place].  Pt provided appointment reminder for new PCP, Dr Tawanda Chacon on July 24 @ 3PM.   Pt said he is IADL, uses no DME, drives and has transportation, has never had home health or been to a SNF and plans to return home and does not anticiapate any needs.  Both pt and his step-mother Ngoc deny other needs or concerns.         Kathie Car RN    Final Discharge Disposition Code  01 - home or self-care     Expected Discharge Date and Time     Expected Discharge Date Expected Discharge Time    Jul 17, 2020         Demographic Summary     Row Name 07/17/20 1600       General Information    Admission Type  observation    Arrived From  home    Referral Source  admission list    Reason for Consult  discharge planning    Preferred Language  English       Contact Information    Permission Granted to Share Info With  family/designee        Functional Status     Row Name 07/17/20 1600       Functional Status, IADL    Medications  independent    Meal Preparation  independent    Housekeeping  independent    Laundry  independent    Shopping  independent       Patient Forms     Row Name 07/17/20 1603       Patient Forms    Provider Choice List  Attempted    Details of attempted delivery  -- Pt said he does not anticipate the need for HH / SNF            Kathie Car, RN

## 2020-07-18 ENCOUNTER — TRANSITIONAL CARE MANAGEMENT TELEPHONE ENCOUNTER (OUTPATIENT)
Dept: CALL CENTER | Facility: HOSPITAL | Age: 45
End: 2020-07-18

## 2020-07-18 NOTE — OUTREACH NOTE
Prep Survey      Responses   [facility] [place] patient discharged from?  [place]   Is LACE score < 7 ?  Yes   Eligibility  [facility]   Date of Admission  07/16/20   Date of Discharge  07/17/20   Discharge Disposition  Home or Self Care   Discharge diagnosis  hx STEMI,  atypical chest pain, uncontrolled HTN   COVID-19 Test Status  Not tested   Does the patient have one of the following disease processes/diagnoses(primary or secondary)?  Other   Does the patient have Home health ordered?  No   Is there a DME ordered?  No   Prep survey completed?  Yes          Gloria Morel RN

## 2020-07-18 NOTE — OUTREACH NOTE
Call Center TCM Note      Responses   [facility] patient discharged from?  [place]   COVID-19 Test Status  Not tested   Does the patient have one of the following disease processes/diagnoses(primary or secondary)?  Other   TCM attempt successful?  Yes   Call start time  1814   Call end time  1817   Discharge diagnosis  hx STEMI,  atypical chest pain, uncontrolled HTN   Meds reviewed with patient/caregiver?  N/A   Does the patient have all medications ordered at discharge?  N/A   Is the patient taking all medications as directed (includes completed medication regime)?  N/A   Does the patient have a primary care provider?   Yes   Does the patient have an appointment with their PCP within 7 days of discharge?  Yes   Comments regarding PCP  Dr. Chacon   Has the patient kept scheduled appointments due by today?  N/A   Has home health visited the patient within 72 hours of discharge?  N/A   Psychosocial issues?  No   Did the patient receive a copy of their discharge instructions?  Yes   Nursing interventions  Reviewed instructions with patient   What is the patient's perception of their health status since discharge?  Improving   Is the patient/caregiver able to teach back signs and symptoms related to disease process for when to call PCP?  Yes   Is the patient/caregiver able to teach back signs and symptoms related to disease process for when to call 911?  Yes   Is the patient/caregiver able to teach back the hierarchy of who to call/visit for symptoms/problems? PCP, Specialist, Home health nurse, Urgent Care, ED, 911  Yes   TCM call completed?  Yes   Wrap up additional comments  States he is doing well. Denies any needs at this time. Has f/u scheduled with PCP and cardiology. Verified he has number for [facility] [employer] if needed.           Aleena Bey RN    7/18/2020, 18:25

## 2020-07-23 ENCOUNTER — TELEPHONE (OUTPATIENT)
Dept: CARDIOLOGY | Facility: CLINIC | Age: 45
End: 2020-07-23

## 2020-07-23 NOTE — PROGRESS NOTES
Date of Office Visit: 2020  Encounter Provider: STANLEY Medley  Place of Service: [facility]  Patient Name: Ruddy Griffin  :1975    Chief Complaint   Patient presents with   • Coronary Artery Disease     1 [facility] FOLLOW UP   • Hypertension   :     HPI: Ruddy Griffin is a 45 y.o. male, new to me, who presents today for follow-up.  Old records have been obtained and reviewed by me.  He is formerly a patient of Dr. Santos with a past cardiac history significant for hypertension and coronary artery disease.  In 2017, he ruled in for a inferior STEMI and was taken emergently to the cath lab where he underwent thrombectomy and drug-eluting stent placement to the RCA.  On 2020, he presented to the ED with complaints of chest pain.  His blood pressure was elevated on admission.  EKG was unremarkable and cardiac enzymes were normal.  Evidently the patient has had a rough couple of months.  He lost his insurance and subsequently was not taking any of his medications.  Nuclear stress testing was negative for ischemia.  There were some abnormalities noted in the right upper upper anterior chest wall rest and stress images.  Echocardiogram revealed normal LV function with an EF of 61 to 65% and no significant valvular abnormalities.  He was restarted on all of his medications and on 2020 was stable for discharge.  He is here today for follow-up.   Since being discharged, he has overall been doing well.  He denies any chest pain, palpitations, edema, dizziness, or syncope.  He has mild shortness of breath on exertion but denies any PND orthopnea.  He does admit to having terrible sleep apnea, although he has never been officially diagnosed or treated.  He uses a CPAP that was given to him.  The mask does not fit him well.  As such, his sleep apnea is the biggest complaint he has.  He is tired all the time.  He has not been exercising but has plans to  start walking.  He has been trying to do better with his diet.  He is actively looking for a job.  He has been having a terrible time getting his medications.  He has been getting the back and forth from the insurance company.      Past Medical History:   Diagnosis Date   • Coronary artery disease    • History of transfusion    • Hyperlipidemia    • Hypertension    • Lumbar spondylosis 2018   • ST elevation myocardial infarction (STEMI) (CMS/[facility]) 2017       Past Surgical History:   Procedure Laterality Date   • APPENDECTOMY     • CARDIAC CATHETERIZATION N/A 2017    Procedure: Left Heart Cath;  Surgeon: Nick Sahu MD;  Location: [facility] INVASIVE LOCATION;  Service:    • CARDIAC CATHETERIZATION N/A 2017    Procedure: Stent LAUREN coronary;  Surgeon: Nick Sahu MD;  Location: [facility] CATH INVASIVE LOCATION;  Service:    • CARDIAC CATHETERIZATION  2017    Procedure: Percutaneous Manual Thrombectomy;  Surgeon: Nick Sahu MD;  Location: [facility] INVASIVE LOCATION;  Service:    • CORONARY STENT PLACEMENT  12/17/2017    x 2   • ENDOSCOPY  2018    (02:41 PM) Hiatal hernia, non-bleeding gastric ulcer with a visible vessel, injected, clip, duodenitis   • ENDOSCOPY N/A 2018    Procedure: ESOPHAGOGASTRODUODENOSCOPY;  Surgeon: Francesca Pascual MD;  Location: [facility];  Service:    • JOINT REPLACEMENT         Social History     Socioeconomic History   • Marital status: Single     Spouse name: Not on file   • Number of children: Not on file   • Years of education: Not on file   • Highest education level: Not on file   Occupational History   • Occupation: unemployed   Tobacco Use   • Smoking status: Former Smoker     Packs/day: 1.00     Years: 15.00     Pack years: 15.00     Types: Cigarettes     Last attempt to quit: 2017     Years since quittin.6   • Smokeless tobacco: Never Used   • Tobacco comment: CAFFEINE USE: 1-2 DIET SUNKISS DAILY   Substance and Sexual Activity    • Alcohol use: No   • Drug use: No     Comment: occ   • Sexual activity: Yes     Partners: Female       Family History   Problem Relation Age of Onset   • Heart disease Mother    • Alcohol abuse Mother    • Heart disease Father    • Heart attack Father    • Diabetes Father    • Cancer Father         prostate   • Heart attack Sister    • Heart disease Sister    • Depression Sister    • Depression Sister        Review of Systems   Constitution: Positive for malaise/fatigue. Negative for chills and fever.   Cardiovascular: Positive for dyspnea on exertion. Negative for chest pain, leg swelling, near-syncope, orthopnea, palpitations, paroxysmal nocturnal dyspnea and syncope.   Respiratory: Positive for snoring. Negative for cough and shortness of breath.    Musculoskeletal: Negative for joint pain, joint swelling and myalgias.   Gastrointestinal: Negative for abdominal pain, diarrhea, melena, nausea and vomiting.   Genitourinary: Negative for frequency and hematuria.   Neurological: Negative for light-headedness, numbness, paresthesias and seizures.   Allergic/Immunologic: Negative.    All other systems reviewed and are negative.      No Known Allergies      Current Outpatient Medications:   •  carvedilol (COREG) 12.5 MG tablet, Take 1 tablet by mouth 2 (Two) Times a Day., Disp: 30 tablet, Rfl: 11  •  clopidogrel (PLAVIX) 75 MG tablet, Take 1 tablet by mouth Daily., Disp: 90 tablet, Rfl: 3  •  HYDROcodone-acetaminophen (NORCO) 7.5-325 MG per tablet, Take 1 tablet by mouth Every 6 (Six) Hours As Needed for Moderate Pain  for up to 12 doses., Disp: 12 tablet, Rfl: 0  •  aspirin 81 MG chewable tablet, CHEW AND SWALLOW 1 TABLET BY MOUTH DAILY, Disp: 30 tablet, Rfl: 6  •  atorvastatin (LIPITOR) 20 MG tablet, TAKE 1 TABLET BY MOUTH DAILY, Disp: 30 tablet, Rfl: 0  •  cholecalciferol (VITAMIN D3) 1000 units tablet, Take 1,000 Units by mouth Daily., Disp: , Rfl:       Objective:     Vitals:    07/24/20 1042 07/24/20 1054   BP:  "(!) 160/120 (!) 162/118   BP Location: Right arm Left arm   Patient Position: Sitting Sitting   Pulse: 96    Resp: 18    SpO2: 97%    Weight: 112 kg (247 lb 6.4 oz)    Height: 175.3 cm (69\")      Body mass index is 36.53 kg/m².    PHYSICAL EXAM:    Physical Exam   Constitutional: He is oriented to person, place, and time. He appears well-developed and well-nourished. No distress.   HENT:   Head: Normocephalic and atraumatic.   Eyes: Pupils are equal, round, and reactive to light.   Neck: No JVD present. No thyromegaly present.   Cardiovascular: Normal rate, regular rhythm, normal heart sounds and intact distal pulses.   No murmur heard.  Pulmonary/Chest: Effort normal and breath sounds normal. No respiratory distress.   Abdominal: Soft. Bowel sounds are normal. He exhibits no distension. There is no splenomegaly or hepatomegaly. There is no tenderness.   Musculoskeletal: Normal range of motion. He exhibits no edema.   Neurological: He is alert and oriented to person, place, and time.   Skin: Skin is warm and dry. He is not diaphoretic. No erythema.   Psychiatric: He has a normal mood and affect. His behavior is normal. Judgment normal.         ECG 12 Lead  Date/Time: 7/24/2020 10:59 AM  Performed by: Nati Olsen APRN  Authorized by: Nati Olsen APRN   Comparison: compared with previous ECG from 7/17/2020  Similar to previous ECG  Rhythm: sinus rhythm  Rate: normal  BPM: 87  T flattening: III    Clinical impression: normal ECG  Comments: Indication: CAD              Assessment:       Diagnosis Plan   1. Coronary artery disease involving native coronary artery of native heart without angina pectoris  ECG 12 Lead   2. Essential hypertension     3. Hyperlipidemia, unspecified hyperlipidemia type     4. Obstructive sleep apnea  Ambulatory Referral to Sleep Medicine     Orders Placed This Encounter   Procedures   • Ambulatory Referral to Sleep Medicine     Referral Priority:   Routine     Referral Type:  "  Consultation     Referral Reason:   Specialty Services Required     Referred to Provider:   Shravan Ortega MD     Requested Specialty:   Sleep Medicine     Number of Visits Requested:   1   • ECG 12 Lead     This order was created via procedure documentation          Plan:       1.  Coronary artery disease.  He is on aspirin and atorvastatin.  His recent stress test revealed no ischemia.  His EKG is stable and unchanged.  Risk factor modification is going to be crucial for him, and we discussed this in detail.  We have provided him with samples of aspirin today.      2.  Hypertension.  His blood pressure is way too high.  We discussed the importance of sodium discretion and physical activity.  Additionally, I am going to increase his carvedilol to 12.5 mg twice daily and have him come back next week for a blood pressure check.  The next step would be getting him on an ACE or an ARB.      3.  Hyperlipidemia.  Lipid panel from hospitalization revealed a total cholesterol of 205, triglycerides of 423, HDL of 26.  LDL could not be calculated due to elevated triglycerides.  Atorvastatin 20 mg was resumed.  He will need a repeat lipid panel and LFTs in 3 months.      4.  Obstructive sleep apnea.  I have placed a referral to sleep medicine.      Overall I think he is stable and doing well.  He denies any symptoms of angina or heart failure.  I did encourage him to get scheduled for his CTA to further evaluate the right upper chest lesion, and I will notify hospital scheduling.  He will follow-up with Dr. Guillaume in 1 month.      As always, it has been a pleasure to participate in your patient's care.      Sincerely,         STANLEY Schneider

## 2020-07-23 NOTE — TELEPHONE ENCOUNTER
Spoke to pt re: CT A, he said he will see Nati tomorrow and then decide if he needs it.    Thank you    Jami RENAE

## 2020-07-24 ENCOUNTER — OFFICE VISIT (OUTPATIENT)
Dept: CARDIOLOGY | Facility: CLINIC | Age: 45
End: 2020-07-24

## 2020-07-24 VITALS
SYSTOLIC BLOOD PRESSURE: 162 MMHG | WEIGHT: 247.4 LBS | DIASTOLIC BLOOD PRESSURE: 118 MMHG | HEART RATE: 96 BPM | HEIGHT: 69 IN | OXYGEN SATURATION: 97 % | BODY MASS INDEX: 36.64 KG/M2 | RESPIRATION RATE: 18 BRPM

## 2020-07-24 DIAGNOSIS — E78.5 HYPERLIPIDEMIA, UNSPECIFIED HYPERLIPIDEMIA TYPE: ICD-10-CM

## 2020-07-24 DIAGNOSIS — I25.10 CORONARY ARTERY DISEASE INVOLVING NATIVE CORONARY ARTERY OF NATIVE HEART WITHOUT ANGINA PECTORIS: Primary | ICD-10-CM

## 2020-07-24 DIAGNOSIS — G47.33 OBSTRUCTIVE SLEEP APNEA: ICD-10-CM

## 2020-07-24 DIAGNOSIS — I10 ESSENTIAL HYPERTENSION: ICD-10-CM

## 2020-07-24 PROCEDURE — 93000 ELECTROCARDIOGRAM COMPLETE: CPT | Performed by: NURSE PRACTITIONER

## 2020-07-24 PROCEDURE — 99214 OFFICE O/P EST MOD 30 MIN: CPT | Performed by: NURSE PRACTITIONER

## 2020-07-24 RX ORDER — CARVEDILOL 12.5 MG/1
12.5 TABLET ORAL 2 TIMES DAILY
Qty: 30 TABLET | Refills: 11 | Status: SHIPPED | OUTPATIENT
Start: 2020-07-24 | End: 2020-10-23 | Stop reason: SDUPTHER

## 2020-07-29 ENCOUNTER — TELEPHONE (OUTPATIENT)
Dept: CARDIOLOGY | Facility: CLINIC | Age: 45
End: 2020-07-29

## 2020-07-29 ENCOUNTER — HOSPITAL ENCOUNTER (OUTPATIENT)
Dept: CT IMAGING | Facility: HOSPITAL | Age: 45
Discharge: HOME | End: 2020-07-29
Admitting: NURSE PRACTITIONER

## 2020-07-29 LAB — CREAT BLDA-MCNC: 0.9 MG/DL (ref 0.6–1.3)

## 2020-07-29 PROCEDURE — 82565 ASSAY OF CREATININE: CPT

## 2020-07-29 PROCEDURE — 0 IOPAMIDOL PER 1 ML: Performed by: NURSE PRACTITIONER

## 2020-07-29 PROCEDURE — 71275 CT ANGIOGRAPHY CHEST: CPT

## 2020-07-29 RX ADMIN — IOPAMIDOL 150 ML: 755 INJECTION, SOLUTION INTRAVENOUS at 08:31

## 2020-07-29 NOTE — TELEPHONE ENCOUNTER
----- Message from STANLEY Jacques sent at 7/29/2020 11:10 AM EDT -----  Regarding: FW:  Please let patient know his CT scan did not show any significant lesions in the chest. There was a nodule in the left upper lung but this appeared benign. Just follow up with his pcp. Thanks  ----- Message -----  From: Interface, Rad Results [place] In  Sent: 7/29/2020   9:34 AM EDT  To: STANLEY Jacques

## 2020-08-19 ENCOUNTER — APPOINTMENT (OUTPATIENT)
Dept: SLEEP MEDICINE | Facility: HOSPITAL | Age: 45
End: 2020-08-19

## 2020-10-13 ENCOUNTER — HOSPITAL ENCOUNTER (EMERGENCY)
Facility: HOSPITAL | Age: 45
Discharge: HOME | End: 2020-10-13
Attending: EMERGENCY MEDICINE | Admitting: EMERGENCY MEDICINE

## 2020-10-13 VITALS
OXYGEN SATURATION: 97 % | DIASTOLIC BLOOD PRESSURE: 100 MMHG | TEMPERATURE: 97 F | RESPIRATION RATE: 18 BRPM | SYSTOLIC BLOOD PRESSURE: 142 MMHG | HEART RATE: 94 BPM

## 2020-10-13 DIAGNOSIS — R04.0 ANTERIOR EPISTAXIS: Primary | ICD-10-CM

## 2020-10-13 LAB
BASOPHILS # BLD AUTO: 0.04 10*3/MM3 (ref 0–0.2)
BASOPHILS NFR BLD AUTO: 0.4 % (ref 0–1.5)
DEPRECATED RDW RBC AUTO: 48.8 FL (ref 37–54)
EOSINOPHIL # BLD AUTO: 0.03 10*3/MM3 (ref 0–0.4)
EOSINOPHIL NFR BLD AUTO: 0.3 % (ref 0.3–6.2)
ERYTHROCYTE [DISTWIDTH] IN BLOOD BY AUTOMATED COUNT: 15.1 % (ref 12.3–15.4)
HCT VFR BLD AUTO: 42.6 % (ref 37.5–51)
HGB BLD-MCNC: 13.9 G/DL (ref 13–17.7)
IMM GRANULOCYTES # BLD AUTO: 0.09 10*3/MM3 (ref 0–0.05)
IMM GRANULOCYTES NFR BLD AUTO: 0.9 % (ref 0–0.5)
LYMPHOCYTES # BLD AUTO: 2.05 10*3/MM3 (ref 0.7–3.1)
LYMPHOCYTES NFR BLD AUTO: 20.7 % (ref 19.6–45.3)
MCH RBC QN AUTO: 28.4 PG (ref 26.6–33)
MCHC RBC AUTO-ENTMCNC: 32.6 G/DL (ref 31.5–35.7)
MCV RBC AUTO: 87.1 FL (ref 79–97)
MONOCYTES # BLD AUTO: 0.87 10*3/MM3 (ref 0.1–0.9)
MONOCYTES NFR BLD AUTO: 8.8 % (ref 5–12)
NEUTROPHILS NFR BLD AUTO: 6.83 10*3/MM3 (ref 1.7–7)
NEUTROPHILS NFR BLD AUTO: 68.9 % (ref 42.7–76)
NRBC BLD AUTO-RTO: 0.1 /100 WBC (ref 0–0.2)
PLATELET # BLD AUTO: 203 10*3/MM3 (ref 140–450)
PMV BLD AUTO: 11.6 FL (ref 6–12)
RBC # BLD AUTO: 4.89 10*6/MM3 (ref 4.14–5.8)
WBC # BLD AUTO: 9.91 10*3/MM3 (ref 3.4–10.8)

## 2020-10-13 PROCEDURE — 85025 COMPLETE CBC W/AUTO DIFF WBC: CPT | Performed by: EMERGENCY MEDICINE

## 2020-10-13 PROCEDURE — 99283 EMERGENCY DEPT VISIT LOW MDM: CPT

## 2020-10-13 RX ORDER — HYDROCODONE BITARTRATE AND ACETAMINOPHEN 7.5; 325 MG/1; MG/1
1 TABLET ORAL ONCE
Status: COMPLETED | OUTPATIENT
Start: 2020-10-13 | End: 2020-10-13

## 2020-10-13 RX ADMIN — HYDROCODONE BITARTRATE AND ACETAMINOPHEN 1 TABLET: 7.5; 325 TABLET ORAL at 11:10

## 2020-10-13 NOTE — ED NOTES
Pt's sister states concern for HA to top of head x 1 week. States to her he had been very fatigued. Lives with her. Wants head CT, states he lost a lot of blood this am. Dr cordova notified      Aleena Araiza, RN  10/13/20 2509       Aleena Araiza, RN  10/13/20 5958

## 2020-10-13 NOTE — ED NOTES
Pt to ED from home per [facility] with reports of nosebleed all night.  EMS responded to pt's home earlier tonight for sinus congestion, HA, elevated BP.      Pt pale upon arrival to ED.    All triage performed with this RN wearing appropriate PPE.  Pt placed in mask upon arrival to ED.     Stella Israel, RN  10/13/20 3962

## 2020-10-13 NOTE — ED PROVIDER NOTES
EMERGENCY DEPARTMENT ENCOUNTER    Room Number:  31/31  Date of encounter:  10/13/2020  PCP: Tawanda Chacon MD  Historian: Patient      HPI:  Chief Complaint: Nosebleed  A complete HPI/ROS/PMH/PSH/SH/FH are unobtainable due to: None    Context: Ruddy Griffin is a 45 y.o. male who presents to the ED c/o nosebleed.  It came from the left naris.  This started around midnight.  It lasted till 5:30 AM.  It was constant.  Nothing made this better or worse until it stopped with application of continued pressure.  He is on Plavix.  History of CAD.  Denies having lightheadedness.  However, does feel like he got near syncopal with the associated bleeding.      PAST MEDICAL HISTORY  Active Ambulatory Problems     Diagnosis Date Noted   • History of MI (myocardial infarction) 12/17/2017   • Coronary artery disease involving native coronary artery of native heart without angina pectoris 12/26/2017   • Essential hypertension 12/26/2017   • Hyperlipidemia 12/26/2017   • Upper GI bleed 01/06/2018   • Acute gastric ulcer with hemorrhage 01/22/2018   • Lumbar spondylosis 01/22/2018   • Lumbar degenerative disc disease 01/22/2018   • Chest pain 07/16/2020   • Obstructive sleep apnea 07/24/2020     Resolved Ambulatory Problems     Diagnosis Date Noted   • No Resolved Ambulatory Problems     Past Medical History:   Diagnosis Date   • Coronary artery disease    • History of transfusion    • Hypertension    • ST elevation myocardial infarction (STEMI) (CMS/[facility]) 12/17/2017         PAST SURGICAL HISTORY  Past Surgical History:   Procedure Laterality Date   • APPENDECTOMY  2003   • CARDIAC CATHETERIZATION N/A 12/17/2017    Procedure: Left Heart Cath;  Surgeon: Nick Sahu MD;  Location: [facility] CATH INVASIVE LOCATION;  Service:    • CARDIAC CATHETERIZATION N/A 12/17/2017    Procedure: Stent LAUREN coronary;  Surgeon: Nick Sahu MD;  Location: [facility] CATH INVASIVE LOCATION;  Service:    • CARDIAC CATHETERIZATION  12/17/2017    Procedure:  Percutaneous Manual Thrombectomy;  Surgeon: Nick Sahu MD;  Location: [facility] INVASIVE LOCATION;  Service:    • CORONARY STENT PLACEMENT  12/17/2017    x 2   • ENDOSCOPY  2018    (02:41 PM) Hiatal hernia, non-bleeding gastric ulcer with a visible vessel, injected, clip, duodenitis   • ENDOSCOPY N/A 2018    Procedure: ESOPHAGOGASTRODUODENOSCOPY;  Surgeon: Francesca Pascual MD;  Location: [facility] OR;  Service:    • JOINT REPLACEMENT           FAMILY HISTORY  Family History   Problem Relation Age of Onset   • Heart disease Mother    • Alcohol abuse Mother    • Heart disease Father    • Heart attack Father    • Diabetes Father    • Cancer Father         prostate   • Heart attack Sister    • Heart disease Sister    • Depression Sister    • Depression Sister          SOCIAL HISTORY  Social History     Socioeconomic History   • Marital status: Single     Spouse name: Not on file   • Number of children: Not on file   • Years of education: Not on file   • Highest education level: Not on file   Occupational History   • Occupation: unemployed   Tobacco Use   • Smoking status: Former Smoker     Packs/day: 1.00     Years: 15.00     Pack years: 15.00     Types: Cigarettes     Quit date: 2017     Years since quittin.8   • Smokeless tobacco: Never Used   • Tobacco comment: CAFFEINE USE: 1-2 DIET SUNKISS DAILY   Substance and Sexual Activity   • Alcohol use: No   • Drug use: No     Comment: occ   • Sexual activity: Yes     Partners: Female         ALLERGIES  Patient has no known allergies.        REVIEW OF SYSTEMS  Review of Systems     All systems reviewed and negative except for those discussed in HPI.       PHYSICAL EXAM    I have reviewed the triage vital signs and nursing notes.    ED Triage Vitals [10/13/20 0626]   Temp Heart Rate Resp BP SpO2   97 °F (36.1 °C) 102 18 134/73 98 %      Temp src Heart Rate Source Patient Position BP Location FiO2 (%)   Tympanic Monitor Sitting Right arm --        Physical Exam  GENERAL: not distressed  HENT: nares patent, erosion to the left nasal ala  EYES: no scleral icterus  CV: regular rhythm, regular rate  RESPIRATORY: normal effort, clear to auscultation bilaterally  MUSCULOSKELETAL: no deformity  NEURO: alert, moves all extremities, follows commands  SKIN: warm, dry        LAB RESULTS  Recent Results (from the past 24 hour(s))   CBC Auto Differential    Collection Time: 10/13/20 11:15 AM    Specimen: Blood   Result Value Ref Range    WBC 9.91 3.40 - 10.80 10*3/mm3    RBC 4.89 4.14 - 5.80 10*6/mm3    Hemoglobin 13.9 13.0 - 17.7 g/dL    Hematocrit 42.6 37.5 - 51.0 %    MCV 87.1 79.0 - 97.0 fL    MCH 28.4 26.6 - 33.0 pg    MCHC 32.6 31.5 - 35.7 g/dL    RDW 15.1 12.3 - 15.4 %    RDW-SD 48.8 37.0 - 54.0 fl    MPV 11.6 6.0 - 12.0 fL    Platelets 203 140 - 450 10*3/mm3    Neutrophil % 68.9 42.7 - 76.0 %    Lymphocyte % 20.7 19.6 - 45.3 %    Monocyte % 8.8 5.0 - 12.0 %    Eosinophil % 0.3 0.3 - 6.2 %    Basophil % 0.4 0.0 - 1.5 %    Immature Grans % 0.9 (H) 0.0 - 0.5 %    Neutrophils, Absolute 6.83 1.70 - 7.00 10*3/mm3    Lymphocytes, Absolute 2.05 0.70 - 3.10 10*3/mm3    Monocytes, Absolute 0.87 0.10 - 0.90 10*3/mm3    Eosinophils, Absolute 0.03 0.00 - 0.40 10*3/mm3    Basophils, Absolute 0.04 0.00 - 0.20 10*3/mm3    Immature Grans, Absolute 0.09 (H) 0.00 - 0.05 10*3/mm3    nRBC 0.1 0.0 - 0.2 /100 WBC       Ordered the above labs and independently reviewed the results.        RADIOLOGY  No Radiology Exams Resulted Within Past 24 Hours    I ordered the above noted radiological studies. Reviewed by me and discussed with radiologist.  See dictation for official radiology interpretation.      PROCEDURES    Procedures      MEDICATIONS GIVEN IN ER    Medications   HYDROcodone-acetaminophen (NORCO) 7.5-325 MG per tablet 1 tablet (1 tablet Oral Given 10/13/20 1110)         PROGRESS, DATA ANALYSIS, CONSULTS, AND MEDICAL DECISION MAKING    All labs have been independently  reviewed by me.  All radiology studies have been reviewed by me and discussed with radiologist dictating the report.   EKG's independently viewed and interpreted by me.  Discussion below represents my analysis of pertinent findings related to patient's condition, differential diagnosis, treatment plan and final disposition.    Patient presents with left nares anterior nosebleed.  It has resolved the time I have seen him.  He has some excoriation to the left nasal ala.  He declines cauterization.  I gave him good return precautions.  Discharge home.  He has normal hemoglobin.    ED Course as of Oct 13 1155   Tue Oct 13, 2020   1151 Hemoglobin: 13.9 [TD]      ED Course User Index  [TD] Nick Espinoza II, MD           PPE: Both the patient and I wore a surgical mask throughout the entire patient encounter. I wore protective goggles.     AS OF 11:55 EDT VITALS:    BP - 134/73  HR - 102  TEMP - 97 °F (36.1 °C) (Tympanic)  O2 SATS - 98%        DIAGNOSIS  Final diagnoses:   Anterior epistaxis of left naris         DISPOSITION  DISCHARGE    FOLLOW-UP  [facility] Emergency Department  [address] [address] [phone]  Go to   Immediately if you have persistent or severe nosebleeding         Medication List      No changes were made to your prescriptions during this visit.                  Nick Espinoza II, MD  10/13/20 [id number]

## 2020-10-13 NOTE — ED NOTES
Pt to ED from home, states he had nosebleed that lasted entire night last night., pt is on plavix for stents. Bleeding resolved on arrival. Pt denies SOA.     Reporting chronic back pain     Patient was placed in face mask in first look. Patient was wearing facemask when I entered the room and throughout our encounter. I wore full protective equipment throughout this patient encounter including a face mask, eye shield,  and gloves. Hand hygiene was performed before donning protective equipment and after removal when leaving the room.       Aleena Araiza, RN  10/13/20 5094

## 2020-10-19 ENCOUNTER — TELEPHONE (OUTPATIENT)
Dept: CARDIOLOGY | Facility: CLINIC | Age: 45
End: 2020-10-19

## 2020-10-19 DIAGNOSIS — E78.5 HYPERLIPIDEMIA, UNSPECIFIED HYPERLIPIDEMIA TYPE: Primary | ICD-10-CM

## 2020-10-23 RX ORDER — CARVEDILOL 12.5 MG/1
12.5 TABLET ORAL 2 TIMES DAILY
Qty: 60 TABLET | Refills: 11 | Status: SHIPPED | OUTPATIENT
Start: 2020-10-23 | End: 2021-11-29

## 2020-10-23 NOTE — TELEPHONE ENCOUNTER
S/W pt re: Carvedilol refills. Pt stated pharm didn't give him enough to last all month.    Nati,  The Rx you sent was incorrect. You gave him 30 tabs instead of 60 for a 30 day supply. I have corrected this. Please review and sign.    MANUEL Douglas

## 2021-03-31 ENCOUNTER — BULK ORDERING (OUTPATIENT)
Dept: CASE MANAGEMENT | Facility: OTHER | Age: 46
End: 2021-03-31

## 2021-03-31 DIAGNOSIS — Z23 IMMUNIZATION DUE: ICD-10-CM

## 2021-04-19 NOTE — TELEPHONE ENCOUNTER
Needs appointment, patient's father states patient is working need to call back between 4 to 4:30   Number Of Hemigard Strips Per Side: 1

## 2021-11-29 RX ORDER — CARVEDILOL 12.5 MG/1
TABLET ORAL
Qty: 60 TABLET | Refills: 1 | Status: SHIPPED | OUTPATIENT
Start: 2021-11-29 | End: 2021-12-17 | Stop reason: SDUPTHER

## 2021-12-17 ENCOUNTER — OFFICE VISIT (OUTPATIENT)
Dept: CARDIOLOGY | Facility: CLINIC | Age: 46
End: 2021-12-17

## 2021-12-17 VITALS
HEART RATE: 89 BPM | WEIGHT: 264 LBS | DIASTOLIC BLOOD PRESSURE: 74 MMHG | BODY MASS INDEX: 39.1 KG/M2 | HEIGHT: 69 IN | SYSTOLIC BLOOD PRESSURE: 140 MMHG

## 2021-12-17 DIAGNOSIS — I25.10 CORONARY ARTERY DISEASE INVOLVING NATIVE CORONARY ARTERY OF NATIVE HEART WITHOUT ANGINA PECTORIS: Primary | ICD-10-CM

## 2021-12-17 DIAGNOSIS — E78.5 HYPERLIPIDEMIA, UNSPECIFIED HYPERLIPIDEMIA TYPE: ICD-10-CM

## 2021-12-17 DIAGNOSIS — I10 ESSENTIAL HYPERTENSION: ICD-10-CM

## 2021-12-17 DIAGNOSIS — Z72.0 TOBACCO ABUSE: ICD-10-CM

## 2021-12-17 PROCEDURE — 93000 ELECTROCARDIOGRAM COMPLETE: CPT | Performed by: NURSE PRACTITIONER

## 2021-12-17 PROCEDURE — 99214 OFFICE O/P EST MOD 30 MIN: CPT | Performed by: NURSE PRACTITIONER

## 2021-12-17 RX ORDER — ATORVASTATIN CALCIUM 40 MG/1
40 TABLET, FILM COATED ORAL DAILY
Qty: 90 TABLET | Refills: 0 | Status: SHIPPED | OUTPATIENT
Start: 2021-12-17

## 2021-12-17 RX ORDER — CARVEDILOL 25 MG/1
25 TABLET ORAL 2 TIMES DAILY
Qty: 180 TABLET | Refills: 0 | Status: SHIPPED | OUTPATIENT
Start: 2021-12-17 | End: 2022-02-04 | Stop reason: SDUPTHER

## 2021-12-17 NOTE — PROGRESS NOTES
Date of Office Visit: 2021  Encounter Provider: STANLEY Barfield  Place of Service: [facility] CARDIOLOGY  Patient Name: Ruddy Griffin  :1975    Chief Complaint   Patient presents with   • Hypertension   :     HPI: Ruddy Griffin is a 46 y.o. male.  He is a patient whom we follow for the management of hypertension and coronary artery disease.  In , he ruled in for a inferior STEMI and was taken emergently to the cath lab where he underwent thrombectomy and drug-eluting stent placement to the RCA.  On 2020, he presented to the ED with complaints of chest pain.  His blood pressure was elevated on admission.  EKG was unremarkable and cardiac enzymes were normal.  Evidently the patient has had a rough couple of months.  He lost his insurance and subsequently was not taking any of his medications.  Nuclear stress testing was negative for ischemia.  There were some abnormalities noted in the right upper upper anterior chest wall rest and stress images.  Echocardiogram revealed normal LV function with an EF of 61 to 65% and no significant valvular abnormalities.   I last saw him in the office in 2020 at which time he was doing well.  He had mild shortness of breath on exertion.  His sleep apnea was his biggest complaint, Cynthia referred him to sleep medicine.  His blood pressure was poorly controlled, and I increased the carvedilol to 12.5 mg twice daily.  He was advised to return in 1 week for a blood pressure check.  However, he has not been seen since that time.   From a symptom standpoint, he is feeling well.  He denies any chest pain, shortness of breath, palpitations, edema, dizziness, or syncope.  He was smoking 2 packs a week.  However, he is actively trying to quit and has not smoked a cigarette in 3 days.  He admits to leading a pretty unhealthy lifestyle.  He is very sedentary and does not eat well.  Evidently he has been out of his statin for  2 RN skin check done with admitting RN Alecia and RN Crystal, Face photo and skin photos documented in media. Appropriate LDAs opened.   Pt with Rafy score of 14, RN wound protocol ordered on 4/29/21, orders current. Prevention measures in place including, assisting patient to turn every 2 hrs.    quite some time.    Past Medical History:   Diagnosis Date   • Coronary artery disease    • History of transfusion    • Hyperlipidemia    • Hypertension    • Lumbar spondylosis 2018   • ST elevation myocardial infarction (STEMI) ([facility]) 2017       Past Surgical History:   Procedure Laterality Date   • APPENDECTOMY     • CARDIAC CATHETERIZATION N/A 2017    Procedure: Left Heart Cath;  Surgeon: Nick Sahu MD;  Location: [facility] CATH INVASIVE LOCATION;  Service:    • CARDIAC CATHETERIZATION N/A 2017    Procedure: Stent LAUREN coronary;  Surgeon: Nick Sahu MD;  Location: [facility] CATH INVASIVE LOCATION;  Service:    • CARDIAC CATHETERIZATION  2017    Procedure: Percutaneous Manual Thrombectomy;  Surgeon: Nick Sahu MD;  Location: [facility] CATH INVASIVE LOCATION;  Service:    • CORONARY STENT PLACEMENT  12/17/2017    x 2   • ENDOSCOPY  2018    (02:41 PM) Hiatal hernia, non-bleeding gastric ulcer with a visible vessel, injected, clip, duodenitis   • ENDOSCOPY N/A 2018    Procedure: ESOPHAGOGASTRODUODENOSCOPY;  Surgeon: Francesca Pascual MD;  Location: [facility] OR;  Service:    • JOINT REPLACEMENT         Social History     Socioeconomic History   • Marital status: Single   Tobacco Use   • Smoking status: Former Smoker     Packs/day: 1.00     Years: 15.00     Pack years: 15.00     Types: Cigarettes     Quit date: 2017     Years since quittin.0   • Smokeless tobacco: Never Used   • Tobacco comment: CAFFEINE USE: 1-2 DIET SUNKISS DAILY   Substance and Sexual Activity   • Alcohol use: No   • Drug use: No     Comment: occ   • Sexual activity: Yes     Partners: Female       Family History   Problem Relation Age of Onset   • Heart disease Mother    • Alcohol abuse Mother    • Heart disease Father    • Heart attack Father    • Diabetes Father    • Cancer Father         prostate   • Heart attack Sister    • Heart disease Sister    • Depression Sister    • Depression Sister   "      Review of Systems   Constitutional: Negative.   Cardiovascular: Negative.  Negative for chest pain, dyspnea on exertion, leg swelling, orthopnea, paroxysmal nocturnal dyspnea and syncope.   Respiratory: Negative.    Hematologic/Lymphatic: Negative for bleeding problem.   Musculoskeletal: Negative for falls.   Gastrointestinal: Negative for melena.   Neurological: Negative for dizziness and light-headedness.       No Known Allergies      Current Outpatient Medications:   •  aspirin 81 MG chewable tablet, CHEW AND SWALLOW 1 TABLET BY MOUTH DAILY, Disp: 30 tablet, Rfl: 6  •  atorvastatin (LIPITOR) 40 MG tablet, Take 1 tablet by mouth Daily., Disp: 90 tablet, Rfl: 0  •  carvedilol (COREG) 25 MG tablet, Take 1 tablet by mouth 2 (Two) Times a Day., Disp: 180 tablet, Rfl: 0      Objective:     Vitals:    12/17/21 1032   BP: 140/74   Pulse: 89   Weight: 120 kg (264 lb)   Height: 175.3 cm (69\")     Body mass index is 38.99 kg/m².    PHYSICAL EXAM:    Neck:      Vascular: No JVD.   Pulmonary:      Effort: Pulmonary effort is normal.      Breath sounds: Normal breath sounds.   Cardiovascular:      Normal rate. Regular rhythm.      Murmurs: There is no murmur.      No gallop. No click. No rub.   Pulses:     Intact distal pulses.           ECG 12 Lead    Date/Time: 12/17/2021 10:44 AM  Performed by: Nati Olsen APRN  Authorized by: Nati Olsen APRN   Comparison: compared with previous ECG from 7/24/2020  Similar to previous ECG  Rhythm: sinus rhythm  Rate: normal  BPM: 89    Clinical impression: normal ECG  Comments: Indication: CAD              Assessment:       Diagnosis Plan   1. Coronary artery disease involving native coronary artery of native heart without angina pectoris  ECG 12 Lead   2. Essential hypertension     3. Hyperlipidemia, unspecified hyperlipidemia type     4. Tobacco abuse       Orders Placed This Encounter   Procedures   • ECG 12 Lead     This order was created via procedure " documentation     Order Specific Question:   Release to patient     Answer:   Immediate          Plan:       1. Coronary artery disease.  Status post inferior STEMI and drug-eluting stenting of the RCA in 2017.  Stress test from July 2020 demonstrated no evidence of ischemia.  He denies any symptoms of angina.      2.   Hypertension.  His blood pressure is still above goal.  I will increase the carvedilol to 25 mg twice daily.  We discussed a blood pressure goal of less than 120/80.      3.   Hyperlipidemia.  His last lipid panel was in July 2020.  Due to elevated triglycerides of 423, the LDL was unable to be calculated.  He was started on atorvastatin 20 mg daily.  It sounds like he has been out of his statin for some time.  He really needs to be on high intensity statin therapy anyways.  Therefore, I am going to increase the statin to 40 mg daily.  We will recheck a lipid panel and hepatic function panel in 3 months.      Overall I think he is stable.  He denies any symptoms of angina or heart failure.  He really needs to work on lifestyle modifications which we have discussed.  He will follow-up with Dr. Guillaume in 3 months.      As always, it has been a pleasure to participate in your patient's care.      Sincerely,         STANLEY Schneider

## 2022-02-07 RX ORDER — CARVEDILOL 25 MG/1
25 TABLET ORAL 2 TIMES DAILY
Qty: 180 TABLET | Refills: 0 | Status: SHIPPED | OUTPATIENT
Start: 2022-02-07 | End: 2022-05-09

## 2022-03-30 DIAGNOSIS — I25.10 CORONARY ARTERY DISEASE INVOLVING NATIVE CORONARY ARTERY OF NATIVE HEART WITHOUT ANGINA PECTORIS: ICD-10-CM

## 2022-03-30 DIAGNOSIS — E78.5 HYPERLIPIDEMIA, UNSPECIFIED HYPERLIPIDEMIA TYPE: Primary | ICD-10-CM

## 2022-05-09 RX ORDER — CARVEDILOL 25 MG/1
TABLET ORAL
Qty: 180 TABLET | Refills: 0 | Status: SHIPPED | OUTPATIENT
Start: 2022-05-09 | End: 2022-11-02

## 2022-11-02 RX ORDER — CARVEDILOL 25 MG/1
TABLET ORAL
Qty: 60 TABLET | Refills: 0 | Status: SHIPPED | OUTPATIENT
Start: 2022-11-02 | End: 2022-11-23

## 2022-11-22 ENCOUNTER — TELEPHONE (OUTPATIENT)
Dept: CARDIOLOGY | Facility: CLINIC | Age: 47
End: 2022-11-22

## 2022-11-23 RX ORDER — CARVEDILOL 25 MG/1
TABLET ORAL
Qty: 180 TABLET | Refills: 0 | Status: SHIPPED | OUTPATIENT
Start: 2022-11-23 | End: 2023-03-10

## 2022-11-23 NOTE — TELEPHONE ENCOUNTER
Called and left VM. Will continue to try to reach patient.     Zuly Ceron RN  Triage [facility]

## 2023-03-10 RX ORDER — CARVEDILOL 25 MG/1
TABLET ORAL
Qty: 180 TABLET | Refills: 3 | Status: SHIPPED | OUTPATIENT
Start: 2023-03-10

## 2024-08-23 ENCOUNTER — TELEPHONE (OUTPATIENT)
Dept: CARDIOLOGY | Facility: CLINIC | Age: 49
End: 2024-08-23

## 2024-09-04 ENCOUNTER — OFFICE VISIT (OUTPATIENT)
Dept: CARDIOLOGY | Facility: CLINIC | Age: 49
End: 2024-09-04

## 2024-09-04 VITALS
HEIGHT: 69 IN | SYSTOLIC BLOOD PRESSURE: 154 MMHG | BODY MASS INDEX: 39.25 KG/M2 | HEART RATE: 103 BPM | WEIGHT: 265 LBS | DIASTOLIC BLOOD PRESSURE: 90 MMHG

## 2024-09-04 DIAGNOSIS — Z72.0 TOBACCO ABUSE: ICD-10-CM

## 2024-09-04 DIAGNOSIS — I10 ESSENTIAL HYPERTENSION: ICD-10-CM

## 2024-09-04 DIAGNOSIS — E78.5 HYPERLIPIDEMIA, UNSPECIFIED HYPERLIPIDEMIA TYPE: ICD-10-CM

## 2024-09-04 DIAGNOSIS — R06.09 DYSPNEA ON EXERTION: ICD-10-CM

## 2024-09-04 DIAGNOSIS — I25.10 CORONARY ARTERY DISEASE INVOLVING NATIVE CORONARY ARTERY OF NATIVE HEART WITHOUT ANGINA PECTORIS: Primary | ICD-10-CM

## 2024-09-04 PROCEDURE — 93000 ELECTROCARDIOGRAM COMPLETE: CPT | Performed by: NURSE PRACTITIONER

## 2024-09-04 PROCEDURE — 99214 OFFICE O/P EST MOD 30 MIN: CPT | Performed by: NURSE PRACTITIONER

## 2024-09-04 RX ORDER — ATORVASTATIN CALCIUM 40 MG/1
40 TABLET, FILM COATED ORAL DAILY
Qty: 90 TABLET | Refills: 0 | Status: SHIPPED | OUTPATIENT
Start: 2024-09-04

## 2024-09-04 RX ORDER — CARVEDILOL 12.5 MG/1
12.5 TABLET ORAL 2 TIMES DAILY
Qty: 180 TABLET | Refills: 0 | Status: SHIPPED | OUTPATIENT
Start: 2024-09-04

## 2024-09-04 RX ORDER — NICOTINE 21 MG/24HR
1 PATCH, TRANSDERMAL 24 HOURS TRANSDERMAL EVERY 24 HOURS
Qty: 30 PATCH | Refills: 0 | Status: SHIPPED | OUTPATIENT
Start: 2024-09-04

## 2024-09-04 NOTE — PROGRESS NOTES
Date of Office Visit: 2024  Encounter Provider: STANLEY Barfield  Place of Service: [facility] CARDIOLOGY  Patient Name: Ruddy Griffin  :1975    Chief Complaint   Patient presents with    Coronary Artery Disease   :     HPI: Ruddy Griffin is a 49 y.o. male patient with hypertension and coronary artery disease.  In , he had an inferior STEMI and underwent thrombectomy and LAUREN to the RCA.     In , he presented with chest pain.  Stress test was negative for ischemia.  Echocardiogram demonstrated normal LV function.    I last saw him in the office in 2021 at which time he was doing well.  He continued smoking 2 packs/week.  His blood pressure was elevated, and I increased the carvedilol to 25 mg twice daily.  Additionally, I increased the atorvastatin to 40 mg.  He was advised to follow-up in 3 months.  However, he has not been seen since that time.    Overall, he is feeling okay.  He denies any chest pain, palpitations, edema, dizziness, or syncope.  He reports mild dyspnea on exertion, especially with inclines.  He denies any PND or orthopnea.  He also reports headaches.  Three weeks ago, he checked himself into alcohol rehabilitation at [facility].  He is having a very positive experience there.  He continues smoking approximately 6 or 8 cigarettes a day.  He is interested in trying nicotine patches.  Admittedly, he has been out of his medications for at least the last year.  He just recently restarted aspirin.    Past Medical History:   Diagnosis Date    Coronary artery disease     History of transfusion     Hyperlipidemia     Hypertension     Lumbar spondylosis 2018    ST elevation myocardial infarction (STEMI) 2017       Past Surgical History:   Procedure Laterality Date    APPENDECTOMY      CARDIAC CATHETERIZATION N/A 2017    Procedure: Left Heart Cath;  Surgeon: Nick Sahu MD;  Location: [facility] INVASIVE  LOCATION;  Service:     CARDIAC CATHETERIZATION N/A 2017    Procedure: Stent LAUREN coronary;  Surgeon: Nick Sahu MD;  Location:  [facility] CATH INVASIVE LOCATION;  Service:     CARDIAC CATHETERIZATION  2017    Procedure: Percutaneous Manual Thrombectomy;  Surgeon: Nick Sahu MD;  Location:  [facility] CATH INVASIVE LOCATION;  Service:     CORONARY STENT PLACEMENT  12/17/2017    x 2    ENDOSCOPY  2018    (02:41 PM) Hiatal hernia, non-bleeding gastric ulcer with a visible vessel, injected, clip, duodenitis    ENDOSCOPY N/A 2018    Procedure: ESOPHAGOGASTRODUODENOSCOPY;  Surgeon: Francesca Pascual MD;  Location: [facility] MAIN OR;  Service:     JOINT REPLACEMENT         Social History     Socioeconomic History    Marital status: Single   Tobacco Use    Smoking status: Every Day     Current packs/day: 0.00     Average packs/day: 1 pack/day for 15.0 years (15.0 ttl pk-yrs)     Types: Cigarettes     Start date: 2002     Last attempt to quit: 2017     Years since quittin.7    Smokeless tobacco: Never    Tobacco comments:     CAFFEINE USE: 1-2 DIET SUNKISS DAILY   Vaping Use    Vaping status: Never Used   Substance and Sexual Activity    Alcohol use: No     Comment: 3 weeks sober pt is in a rehab center    Drug use: No    Sexual activity: Yes     Partners: Female       Family History   Problem Relation Age of Onset    Heart disease Mother     Alcohol abuse Mother     Heart disease Father     Heart attack Father     Diabetes Father     Cancer Father         prostate    Heart attack Sister     Heart disease Sister     Depression Sister     Depression Sister        Review of Systems   Constitutional: Negative.   Cardiovascular:  Positive for dyspnea on exertion. Negative for chest pain, leg swelling, orthopnea, paroxysmal nocturnal dyspnea and syncope.   Respiratory: Negative.     Hematologic/Lymphatic: Negative for bleeding problem.   Musculoskeletal:  Negative for falls.   Gastrointestinal:  Negative  "for melena.   Neurological:  Positive for headaches. Negative for dizziness and light-headedness.       No Known Allergies      Current Outpatient Medications:     aspirin 81 MG chewable tablet, CHEW AND SWALLOW 1 TABLET BY MOUTH DAILY, Disp: 30 tablet, Rfl: 6    atorvastatin (LIPITOR) 40 MG tablet, Take 1 tablet by mouth Daily., Disp: 90 tablet, Rfl: 0    carvedilol (COREG) 12.5 MG tablet, Take 1 tablet by mouth 2 (Two) Times a Day., Disp: 180 tablet, Rfl: 0    nicotine (Nicoderm CQ) 21 MG/24HR patch, Place 1 patch on the skin as directed by provider Daily., Disp: 30 patch, Rfl: 0      Objective:     Vitals:    09/04/24 1255   BP: 154/90   Pulse: 103   Weight: 120 kg (265 lb)   Height: 175.3 cm (69\")     Body mass index is 39.13 kg/m².    PHYSICAL EXAM:    Neck:      Vascular: No JVD.   Pulmonary:      Effort: Pulmonary effort is normal.      Breath sounds: Normal breath sounds.   Cardiovascular:      Normal rate. Regular rhythm.      Murmurs: There is no murmur.      No gallop.  No click. No rub.   Pulses:     Intact distal pulses.           ECG 12 Lead    Date/Time: 9/4/2024 1:15 PM  Performed by: Nati Olsen APRN    Authorized by: Nati Olsen APRN  Comparison: compared with previous ECG from 12/17/2021  Similar to previous ECG  Rhythm: sinus tachycardia  Rate: tachycardic  BPM: 103  Q waves: III and aVF              Assessment:       Diagnosis Plan   1. Coronary artery disease involving native coronary artery of native heart without angina pectoris  ECG 12 Lead    CBC (No Diff)    Comprehensive Metabolic Panel    Lipid Panel    Adult Transthoracic Echo Complete W/ Cont if Necessary Per Protocol      2. Dyspnea on exertion  Adult Transthoracic Echo Complete W/ Cont if Necessary Per Protocol    proBNP      3. Essential hypertension        4. Hyperlipidemia, unspecified hyperlipidemia type  Lipid Panel      5. Tobacco abuse          Orders Placed This Encounter   Procedures    CBC (No Diff)     " Standing Status:   Future     Standing Expiration Date:   9/4/2025     Order Specific Question:   Release to patient     Answer:   Routine Release [[id number]]    Comprehensive Metabolic Panel     Standing Status:   Future     Standing Expiration Date:   9/4/2025     Order Specific Question:   Release to patient     Answer:   Routine Release [[id number]]    Lipid Panel     Standing Status:   Future     Standing Expiration Date:   9/4/2025     Order Specific Question:   Release to patient     Answer:   Routine Release [[id number]]    proBNP     Standing Status:   Future     Standing Expiration Date:   9/4/2025     Order Specific Question:   Release to patient     Answer:   Routine Release [[id number]]    ECG 12 Lead     This order was created via procedure documentation     Order Specific Question:   Release to patient     Answer:   Routine Release [[id number]]    Adult Transthoracic Echo Complete W/ Cont if Necessary Per Protocol     Standing Status:   Future     Standing Expiration Date:   9/4/2025     Order Specific Question:   Reason for exam?     Answer:   Dyspnea     Order Specific Question:   Release to patient     Answer:   Routine Release [[id number]]          Plan:       1.  Coronary artery disease.  He denies any chest pain.  He is reporting dyspnea on exertion.  I ordered an echocardiogram.  Additionally, I recommended labs.      2.  Hypertension.  His blood pressure is elevated.  He has been out of his medication for some time.  He is also mildly tachycardic, and I have recommended resuming the carvedilol.      3.  Hyperlipidemia.  Resume atorvastatin. Repeat lipid panel ordered.       4.  Tobacco abuse.  Per his request, I have sent in a prescription for nicotine patches.  I applauded his efforts with alcohol cessation and encouraged him to keep up the good work.      Overall, I think he is stable.  I will call him with the results of the above.  Otherwise, he will follow-up with Dr. Guillaume in 6  months.      As always, it has been a pleasure to participate in your patient's care.      Sincerely,         STANLEY Schneider

## 2024-11-06 ENCOUNTER — TELEPHONE (OUTPATIENT)
Dept: CARDIOLOGY | Facility: CLINIC | Age: 49
End: 2024-11-06
Payer: COMMERCIAL

## 2024-12-02 ENCOUNTER — TELEPHONE (OUTPATIENT)
Dept: CARDIOLOGY | Facility: CLINIC | Age: 49
End: 2024-12-02
Payer: COMMERCIAL

## 2024-12-13 RX ORDER — CARVEDILOL 12.5 MG/1
12.5 TABLET ORAL 2 TIMES DAILY
Qty: 180 TABLET | Refills: 0 | Status: SHIPPED | OUTPATIENT
Start: 2024-12-13

## 2024-12-13 RX ORDER — ATORVASTATIN CALCIUM 40 MG/1
40 TABLET, FILM COATED ORAL DAILY
Qty: 90 TABLET | Refills: 0 | Status: SHIPPED | OUTPATIENT
Start: 2024-12-13

## 2024-12-13 NOTE — TELEPHONE ENCOUNTER
"Caller: Ruddy Girffin \"JUMANA\"    Relationship: Self    Best call back number: [phone]     Requested Prescriptions:   Requested Prescriptions     Pending Prescriptions Disp Refills    atorvastatin (LIPITOR) 40 MG tablet 90 tablet 0     Sig: Take 1 tablet by mouth Daily.    carvedilol (COREG) 12.5 MG tablet 180 tablet 0     Sig: Take 1 tablet by mouth 2 (Two) Times a Day.        [id number] [address] AT [facility] [address] - [phone] [facility] [phone] FX     Last office visit with prescribing clinician: 9/4/2024   Last telemedicine visit with prescribing clinician: Visit date not found   Next office visit with prescribing clinician: Visit date not found     Additional details provided by patient: PT CALLING TO REQUEST A NEW PRESCRIPTION BE SENT IN - PT IS OUT OF HIS BP MED AND ASKING IF POSSIBLE TO SWITCH BACK TO 25MG AS THAT HELPED HIM BETTER - PT REQUESTING 90 DAY REFILLS IF POSSIBLE     Does the patient have less than a 3 day supply:  [x] Yes  [] No    Would you like a call back once the refill request has been completed: [x] Yes [] No    If the office needs to give you a call back, can they leave a voicemail: [x] Yes [] No    Jannie Boswell Rep   12/13/24 09:51 EST       "

## 2024-12-16 NOTE — TELEPHONE ENCOUNTER
Yeah I already told him that.  I called him back to let him know coreg was refilled for 12.5 and he can either get a cuff if he has the means and keep a log and call us with 1-2 weeks of readings, or he can call the office and we can get him and appt to address.  He verbalizes understanding.  He will continue on the 12.5 mg BID for now.    Nubia Chavez RN  [facility] Triage  12/16/24 09:55 EST

## 2024-12-16 NOTE — TELEPHONE ENCOUNTER
Patient called and he is requesting coreg be refiiled at 25 mg BID.  He reports his BP is running high and he has a HA.  He does not have a BP cuff.    He is getting his echo 12/27 and he plans to get the labs you ordered from 9/04/24    Does he need an appt if he wants his coreg increased?    Nubia Chavez RN  [facility] Triage  12/16/24 09:35 EST

## 2024-12-16 NOTE — TELEPHONE ENCOUNTER
How does he know his BP is high if he does not have a cuff?  I need some objective date before a change can be made. So he can either provide some blood pressure readings, schedule an appt, or schedule a BP check.

## 2024-12-27 ENCOUNTER — HOSPITAL ENCOUNTER (OUTPATIENT)
Dept: CARDIOLOGY | Facility: HOSPITAL | Age: 49
Discharge: HOME | End: 2024-12-27
Payer: COMMERCIAL

## 2024-12-27 VITALS
SYSTOLIC BLOOD PRESSURE: 181 MMHG | BODY MASS INDEX: 39.25 KG/M2 | DIASTOLIC BLOOD PRESSURE: 138 MMHG | HEIGHT: 69 IN | WEIGHT: 265 LBS

## 2024-12-27 VITALS — DIASTOLIC BLOOD PRESSURE: 120 MMHG | SYSTOLIC BLOOD PRESSURE: 190 MMHG

## 2024-12-27 DIAGNOSIS — R06.09 DYSPNEA ON EXERTION: ICD-10-CM

## 2024-12-27 DIAGNOSIS — E78.5 HYPERLIPIDEMIA, UNSPECIFIED HYPERLIPIDEMIA TYPE: ICD-10-CM

## 2024-12-27 DIAGNOSIS — I25.10 CORONARY ARTERY DISEASE INVOLVING NATIVE CORONARY ARTERY OF NATIVE HEART WITHOUT ANGINA PECTORIS: ICD-10-CM

## 2024-12-27 DIAGNOSIS — I10 ESSENTIAL HYPERTENSION: ICD-10-CM

## 2024-12-27 DIAGNOSIS — I10 HYPERTENSION, UNSPECIFIED TYPE: Primary | ICD-10-CM

## 2024-12-27 DIAGNOSIS — I25.10 CORONARY ARTERY DISEASE INVOLVING NATIVE CORONARY ARTERY OF NATIVE HEART WITHOUT ANGINA PECTORIS: Primary | ICD-10-CM

## 2024-12-27 LAB
ALBUMIN SERPL-MCNC: 3.8 G/DL (ref 3.5–5.2)
ALBUMIN/GLOB SERPL: 1.1 G/DL
ALP SERPL-CCNC: 114 U/L (ref 39–117)
ALT SERPL W P-5'-P-CCNC: 48 U/L (ref 1–41)
ANION GAP SERPL CALCULATED.3IONS-SCNC: 11.3 MMOL/L (ref 5–15)
AORTIC ARCH: 3.3 CM
AORTIC DIMENSIONLESS INDEX: 0.7 (DI)
ASCENDING AORTA: 4 CM
AST SERPL-CCNC: 23 U/L (ref 1–40)
AV MEAN PRESS GRAD SYS DOP V1V2: 4 MMHG
AV VMAX SYS DOP: 132 CM/SEC
BH CV ECHO LEFT VENTRICLE GLOBAL LONGITUDINAL STRAIN: -15.3 %
BH CV ECHO MEAS - ACS: 2.5 CM
BH CV ECHO MEAS - AO MAX PG: 7 MMHG
BH CV ECHO MEAS - AO ROOT DIAM: 4.2 CM
BH CV ECHO MEAS - AO V2 VTI: 24.1 CM
BH CV ECHO MEAS - AVA(I,D): 3 CM2
BH CV ECHO MEAS - EDV(CUBED): 125 ML
BH CV ECHO MEAS - EDV(MOD-SP2): 151 ML
BH CV ECHO MEAS - EDV(MOD-SP4): 138 ML
BH CV ECHO MEAS - EF(MOD-SP2): 58.9 %
BH CV ECHO MEAS - EF(MOD-SP4): 57.2 %
BH CV ECHO MEAS - ESV(CUBED): 40.1 ML
BH CV ECHO MEAS - ESV(MOD-SP2): 62 ML
BH CV ECHO MEAS - ESV(MOD-SP4): 59 ML
BH CV ECHO MEAS - FS: 31.5 %
BH CV ECHO MEAS - IVS/LVPW: 0.92 CM
BH CV ECHO MEAS - IVSD: 1.2 CM
BH CV ECHO MEAS - LAT PEAK E' VEL: 6.9 CM/SEC
BH CV ECHO MEAS - LV DIASTOLIC VOL/BSA (35-75): 59.8 CM2
BH CV ECHO MEAS - LV MASS(C)D: 247.6 GRAMS
BH CV ECHO MEAS - LV MAX PG: 3.6 MMHG
BH CV ECHO MEAS - LV MEAN PG: 2 MMHG
BH CV ECHO MEAS - LV SYSTOLIC VOL/BSA (12-30): 25.5 CM2
BH CV ECHO MEAS - LV V1 MAX: 95.1 CM/SEC
BH CV ECHO MEAS - LV V1 VTI: 17.5 CM
BH CV ECHO MEAS - LVIDD: 5 CM
BH CV ECHO MEAS - LVIDS: 3.4 CM
BH CV ECHO MEAS - LVOT AREA: 4.2 CM2
BH CV ECHO MEAS - LVOT DIAM: 2.3 CM
BH CV ECHO MEAS - LVPWD: 1.3 CM
BH CV ECHO MEAS - MED PEAK E' VEL: 4.1 CM/SEC
BH CV ECHO MEAS - MR MAX PG: 64.3 MMHG
BH CV ECHO MEAS - MR MAX VEL: 401 CM/SEC
BH CV ECHO MEAS - MV A DUR: 0.13 SEC
BH CV ECHO MEAS - MV A MAX VEL: 80.1 CM/SEC
BH CV ECHO MEAS - MV DEC SLOPE: 372.8 CM/SEC2
BH CV ECHO MEAS - MV DEC TIME: 0.23 SEC
BH CV ECHO MEAS - MV E MAX VEL: 58.7 CM/SEC
BH CV ECHO MEAS - MV E/A: 0.73
BH CV ECHO MEAS - MV MAX PG: 2.8 MMHG
BH CV ECHO MEAS - MV MEAN PG: 1.53 MMHG
BH CV ECHO MEAS - MV P1/2T: 60.3 MSEC
BH CV ECHO MEAS - MV V2 VTI: 24.9 CM
BH CV ECHO MEAS - MVA(P1/2T): 3.6 CM2
BH CV ECHO MEAS - MVA(VTI): 2.9 CM2
BH CV ECHO MEAS - PA ACC TIME: 0.12 SEC
BH CV ECHO MEAS - PA V2 MAX: 99.1 CM/SEC
BH CV ECHO MEAS - PI END-D VEL: 81.4 CM/SEC
BH CV ECHO MEAS - PULM A REVS DUR: 0.11 SEC
BH CV ECHO MEAS - PULM A REVS VEL: 28.4 CM/SEC
BH CV ECHO MEAS - PULM DIAS VEL: 33.4 CM/SEC
BH CV ECHO MEAS - PULM S/D: 1.11
BH CV ECHO MEAS - PULM SYS VEL: 37 CM/SEC
BH CV ECHO MEAS - QP/QS: 1.39
BH CV ECHO MEAS - RAP SYSTOLE: 3 MMHG
BH CV ECHO MEAS - RV MAX PG: 2.05 MMHG
BH CV ECHO MEAS - RV V1 MAX: 71.6 CM/SEC
BH CV ECHO MEAS - RV V1 VTI: 12.6 CM
BH CV ECHO MEAS - RVOT DIAM: 3.2 CM
BH CV ECHO MEAS - RVSP: 22 MMHG
BH CV ECHO MEAS - SUP REN AO DIAM: 2.9 CM
BH CV ECHO MEAS - SV(LVOT): 72.9 ML
BH CV ECHO MEAS - SV(MOD-SP2): 89 ML
BH CV ECHO MEAS - SV(MOD-SP4): 79 ML
BH CV ECHO MEAS - SV(RVOT): 101.6 ML
BH CV ECHO MEAS - SVI(LVOT): 31.6 ML/M2
BH CV ECHO MEAS - SVI(MOD-SP2): 38.5 ML/M2
BH CV ECHO MEAS - SVI(MOD-SP4): 34.2 ML/M2
BH CV ECHO MEAS - TAPSE (>1.6): 1.73 CM
BH CV ECHO MEAS - TR MAX PG: 19.3 MMHG
BH CV ECHO MEAS - TR MAX VEL: 219.9 CM/SEC
BH CV ECHO MEASUREMENTS AVERAGE E/E' RATIO: 10.67
BH CV XLRA - RV BASE: 3.7 CM
BH CV XLRA - RV LENGTH: 8.7 CM
BH CV XLRA - RV MID: 2.9 CM
BH CV XLRA - TDI S': 12.8 CM/SEC
BILIRUB SERPL-MCNC: 0.2 MG/DL (ref 0–1.2)
BUN SERPL-MCNC: 10 MG/DL (ref 6–20)
BUN/CREAT SERPL: 12.5 (ref 7–25)
CALCIUM SPEC-SCNC: 9.9 MG/DL (ref 8.6–10.5)
CHLORIDE SERPL-SCNC: 102 MMOL/L (ref 98–107)
CHOLEST SERPL-MCNC: 169 MG/DL (ref 0–200)
CO2 SERPL-SCNC: 24.7 MMOL/L (ref 22–29)
CREAT SERPL-MCNC: 0.8 MG/DL (ref 0.76–1.27)
DEPRECATED RDW RBC AUTO: 44.5 FL (ref 37–54)
EGFRCR SERPLBLD CKD-EPI 2021: 109.2 ML/MIN/1.73
ERYTHROCYTE [DISTWIDTH] IN BLOOD BY AUTOMATED COUNT: 13.8 % (ref 12.3–15.4)
GLOBULIN UR ELPH-MCNC: 3.6 GM/DL
GLUCOSE SERPL-MCNC: 85 MG/DL (ref 65–99)
HCT VFR BLD AUTO: 45 % (ref 37.5–51)
HDLC SERPL-MCNC: 36 MG/DL (ref 40–60)
HGB BLD-MCNC: 14.1 G/DL (ref 13–17.7)
LDLC SERPL CALC-MCNC: 100 MG/DL (ref 0–100)
LDLC/HDLC SERPL: 2.64 {RATIO}
LEFT ATRIUM VOLUME INDEX: 35.8 ML/M2
LV EF BIPLANE MOD: 58.7 %
MCH RBC QN AUTO: 27.4 PG (ref 26.6–33)
MCHC RBC AUTO-ENTMCNC: 31.3 G/DL (ref 31.5–35.7)
MCV RBC AUTO: 87.4 FL (ref 79–97)
NT-PROBNP SERPL-MCNC: 225 PG/ML (ref 0–450)
PLATELET # BLD AUTO: 230 10*3/MM3 (ref 140–450)
PMV BLD AUTO: 12 FL (ref 6–12)
POTASSIUM SERPL-SCNC: 4.5 MMOL/L (ref 3.5–5.2)
PROT SERPL-MCNC: 7.4 G/DL (ref 6–8.5)
RBC # BLD AUTO: 5.15 10*6/MM3 (ref 4.14–5.8)
SINUS: 3.6 CM
SODIUM SERPL-SCNC: 138 MMOL/L (ref 136–145)
STJ: 3.1 CM
TRIGL SERPL-MCNC: 190 MG/DL (ref 0–150)
VLDLC SERPL-MCNC: 33 MG/DL (ref 5–40)
WBC NRBC COR # BLD AUTO: 8.81 10*3/MM3 (ref 3.4–10.8)

## 2024-12-27 PROCEDURE — 96374 THER/PROPH/DIAG INJ IV PUSH: CPT

## 2024-12-27 PROCEDURE — 80061 LIPID PANEL: CPT | Performed by: NURSE PRACTITIONER

## 2024-12-27 PROCEDURE — 85027 COMPLETE CBC AUTOMATED: CPT | Performed by: NURSE PRACTITIONER

## 2024-12-27 PROCEDURE — 93356 MYOCRD STRAIN IMG SPCKL TRCK: CPT

## 2024-12-27 PROCEDURE — 93306 TTE W/DOPPLER COMPLETE: CPT

## 2024-12-27 PROCEDURE — 25010000002 HYDRALAZINE PER 20 MG: Performed by: INTERNAL MEDICINE

## 2024-12-27 PROCEDURE — 80053 COMPREHEN METABOLIC PANEL: CPT | Performed by: NURSE PRACTITIONER

## 2024-12-27 PROCEDURE — 36415 COLL VENOUS BLD VENIPUNCTURE: CPT

## 2024-12-27 PROCEDURE — 83880 ASSAY OF NATRIURETIC PEPTIDE: CPT | Performed by: NURSE PRACTITIONER

## 2024-12-27 RX ORDER — HYDRALAZINE HYDROCHLORIDE 20 MG/ML
10 INJECTION INTRAMUSCULAR; INTRAVENOUS ONCE
Status: COMPLETED | OUTPATIENT
Start: 2024-12-27 | End: 2024-12-27

## 2024-12-27 RX ORDER — AMLODIPINE BESYLATE 10 MG/1
10 TABLET ORAL DAILY
Qty: 90 TABLET | Refills: 3 | Status: SHIPPED | OUTPATIENT
Start: 2024-12-27

## 2024-12-27 RX ADMIN — HYDRALAZINE HYDROCHLORIDE 10 MG: 20 INJECTION INTRAMUSCULAR; INTRAVENOUS at 14:52

## 2025-01-21 ENCOUNTER — OFFICE VISIT (OUTPATIENT)
Dept: CARDIOLOGY | Facility: CLINIC | Age: 50
End: 2025-01-21
Payer: COMMERCIAL

## 2025-01-21 VITALS
WEIGHT: 297 LBS | HEART RATE: 99 BPM | SYSTOLIC BLOOD PRESSURE: 140 MMHG | HEIGHT: 69 IN | BODY MASS INDEX: 43.99 KG/M2 | DIASTOLIC BLOOD PRESSURE: 110 MMHG

## 2025-01-21 DIAGNOSIS — Z72.0 TOBACCO ABUSE: ICD-10-CM

## 2025-01-21 DIAGNOSIS — E78.5 HYPERLIPIDEMIA, UNSPECIFIED HYPERLIPIDEMIA TYPE: ICD-10-CM

## 2025-01-21 DIAGNOSIS — G47.33 OBSTRUCTIVE SLEEP APNEA: ICD-10-CM

## 2025-01-21 DIAGNOSIS — I10 ESSENTIAL HYPERTENSION: ICD-10-CM

## 2025-01-21 DIAGNOSIS — R06.09 DYSPNEA ON EXERTION: ICD-10-CM

## 2025-01-21 DIAGNOSIS — I25.10 CORONARY ARTERY DISEASE INVOLVING NATIVE CORONARY ARTERY OF NATIVE HEART WITHOUT ANGINA PECTORIS: Primary | ICD-10-CM

## 2025-01-21 PROCEDURE — 3080F DIAST BP >= 90 MM HG: CPT | Performed by: NURSE PRACTITIONER

## 2025-01-21 PROCEDURE — 1160F RVW MEDS BY RX/DR IN RCRD: CPT | Performed by: NURSE PRACTITIONER

## 2025-01-21 PROCEDURE — 1159F MED LIST DOCD IN RCRD: CPT | Performed by: NURSE PRACTITIONER

## 2025-01-21 PROCEDURE — 93000 ELECTROCARDIOGRAM COMPLETE: CPT | Performed by: NURSE PRACTITIONER

## 2025-01-21 PROCEDURE — 99214 OFFICE O/P EST MOD 30 MIN: CPT | Performed by: NURSE PRACTITIONER

## 2025-01-21 PROCEDURE — 3077F SYST BP >= 140 MM HG: CPT | Performed by: NURSE PRACTITIONER

## 2025-01-21 RX ORDER — LOSARTAN POTASSIUM 25 MG/1
25 TABLET ORAL DAILY
Qty: 90 TABLET | Refills: 0 | Status: SHIPPED | OUTPATIENT
Start: 2025-01-21

## 2025-01-21 RX ORDER — ATORVASTATIN CALCIUM 80 MG/1
80 TABLET, FILM COATED ORAL DAILY
Qty: 90 TABLET | Refills: 0 | Status: SHIPPED | OUTPATIENT
Start: 2025-01-21

## 2025-01-21 RX ORDER — ASPIRIN 81 MG/1
81 TABLET, CHEWABLE ORAL DAILY
Qty: 30 TABLET | Refills: 6 | Status: SHIPPED | OUTPATIENT
Start: 2025-01-21

## 2025-01-21 RX ORDER — CARVEDILOL 25 MG/1
25 TABLET ORAL 2 TIMES DAILY
Qty: 180 TABLET | Refills: 0 | Status: SHIPPED | OUTPATIENT
Start: 2025-01-21

## 2025-01-21 NOTE — PROGRESS NOTES
Date of Office Visit: 2025  Encounter Provider: STANLEY Barfield  Place of Service: [facility] CARDIOLOGY  Patient Name: Ruddy Griffin  :1975    Chief Complaint   Patient presents with    Coronary Artery Disease   :     HPI: Ruddy Griffin is a 49 y.o. male patient with hypertension and coronary artery disease.  In , he had an inferior STEMI and underwent thrombectomy and LAUREN to the RCA.      In , he presented with chest pain.  Stress test was negative for ischemia.  Echocardiogram demonstrated normal LV function.    I last saw him in the office in 2024 at which time he was overall doing well.  He did report mild dyspnea on exertion.  He had checked himself into an alcohol rehabilitation center which was going really well.  He continued smoking but requested a prescription for nicotine patches.  His blood pressure was elevated.  However, he had been out of all of his medication for some time.  I recommended resuming carvedilol.  I also recommended an echocardiogram.  In 2024, this demonstrated normal LV function and no significant valvular abnormalities.  He had a mildly dilated aortic root measuring 4.2 cm.  During the echocardiogram, his blood pressure was significantly elevated and he was started on amlodipine.  He is here today for follow up.    He has been doing okay.  He reports continued dyspnea on exertion.  Last week he had some fluttering in his chest.  He denies any chest pain, edema, dizziness, or syncope.  He continues smoking.  He did not tolerate the nicotine patches.  They caused significant nausea.  He never started the aspirin because he could not afford to buy any.  He just celebrated his 5 month milestone of sobriety.  He continues working with [employer] on finding employment.  He has gained quite a bit of weight.  He is pretty sedentary.    Past Medical History:   Diagnosis Date    Coronary artery disease      History of transfusion     Hyperlipidemia     Hypertension     Lumbar spondylosis 2018    ST elevation myocardial infarction (STEMI) 2017       Past Surgical History:   Procedure Laterality Date    APPENDECTOMY  2003    CARDIAC CATHETERIZATION N/A 2017    Procedure: Left Heart Cath;  Surgeon: Nick Sahu MD;  Location: [facility] CATH INVASIVE LOCATION;  Service:     CARDIAC CATHETERIZATION N/A 2017    Procedure: Stent LAUREN coronary;  Surgeon: Nick Sahu MD;  [facility];  Service:     CARDIAC CATHETERIZATION  2017    Procedure: Percutaneous Manual Thrombectomy;  Surgeon: Nick Sahu MD;  [facility];  Service:     CORONARY STENT PLACEMENT  12/17/2017    x 2    ENDOSCOPY  2018    (02:41 PM) Hiatal hernia, non-bleeding gastric ulcer with a visible vessel, injected, clip, duodenitis    ENDOSCOPY N/A 2018    Procedure: ESOPHAGOGASTRODUODENOSCOPY;  Surgeon: Francesca Pascual MD;  Location: [facility] OR;  Service:     JOINT REPLACEMENT         Social History     Socioeconomic History    Marital status: Single   Tobacco Use    Smoking status: Every Day     Current packs/day: 0.00     Average packs/day: 1 pack/day for 15.0 years (15.0 ttl pk-yrs)     Types: Cigarettes     Start date: 2002     Last attempt to quit: 2017     Years since quittin.1    Smokeless tobacco: Never    Tobacco comments:     CAFFEINE USE: 1-2 DIET SUNKISS DAILY   Vaping Use    Vaping status: Never Used   Substance and Sexual Activity    Alcohol use: No     Comment: 3 weeks sober pt is in a rehab center    Drug use: No    Sexual activity: Yes     Partners: Female       Family History   Problem Relation Age of Onset    Heart disease Mother     Alcohol abuse Mother     Heart disease Father     Heart attack Father     Diabetes Father     Cancer Father         prostate    Heart attack Sister     Heart disease Sister     Depression Sister      "Depression Sister        Review of Systems   Constitutional: Negative.   Cardiovascular:  Positive for dyspnea on exertion and palpitations. Negative for chest pain, leg swelling, orthopnea, paroxysmal nocturnal dyspnea and syncope.   Respiratory: Negative.     Hematologic/Lymphatic: Negative for bleeding problem.   Musculoskeletal:  Negative for falls.   Gastrointestinal:  Negative for melena.   Neurological:  Negative for dizziness and light-headedness.       No Known Allergies      Current Outpatient Medications:     amLODIPine (NORVASC) 10 MG tablet, Take 1 tablet by mouth Daily., Disp: 90 tablet, Rfl: 3    aspirin 81 MG chewable tablet, Chew 1 tablet Daily., Disp: 30 tablet, Rfl: 6    atorvastatin (LIPITOR) 80 MG tablet, Take 1 tablet by mouth Daily., Disp: 90 tablet, Rfl: 0    carvedilol (COREG) 25 MG tablet, Take 1 tablet by mouth 2 (Two) Times a Day., Disp: 180 tablet, Rfl: 0    losartan (Cozaar) 25 MG tablet, Take 1 tablet by mouth Daily., Disp: 90 tablet, Rfl: 0      Objective:     Vitals:    01/21/25 1255   BP: (!) 140/110   BP Location: Left arm   Patient Position: Sitting   Cuff Size: Adult   Pulse: 99   Weight: 135 kg (297 lb)   Height: 175.3 cm (69\")     Body mass index is 43.86 kg/m².    PHYSICAL EXAM:    Neck:      Vascular: No JVD.   Pulmonary:      Effort: Pulmonary effort is normal.      Breath sounds: Normal breath sounds.   Cardiovascular:      Normal rate. Regular rhythm.      Murmurs: There is no murmur.      No gallop.  No click. No rub.   Pulses:     Intact distal pulses.           ECG 12 Lead    Date/Time: 1/21/2025 1:08 PM  Performed by: Nati Olsen APRN    Authorized by: Nati Olsen APRN  Comparison: compared with previous ECG from 9/4/2024  Similar to previous ECG  Rhythm: sinus rhythm  Rate: normal  BPM: 99            Assessment:       Diagnosis Plan   1. Coronary artery disease involving native coronary artery of native heart without angina pectoris  ECG 12 Lead    " Stress Test With Myocardial Perfusion One Day      2. Dyspnea on exertion  Stress Test With Myocardial Perfusion One Day    XR Chest 2 View    Ambulatory Referral to Pulmonology      3. Essential hypertension  Basic Metabolic Panel      4. Hyperlipidemia, unspecified hyperlipidemia type        5. Obstructive sleep apnea  Ambulatory Referral to Sleep Medicine      6. Tobacco abuse          Orders Placed This Encounter   Procedures    XR Chest 2 View     Standing Status:   Future     Standing Expiration Date:   1/21/2026     Order Specific Question:   Reason for Exam:     Answer:   shortness of breath     Order Specific Question:   Release to patient     Answer:   Routine Release [[id number]]    Basic Metabolic Panel     Standing Status:   Future     Standing Expiration Date:   1/21/2026     Order Specific Question:   Release to patient     Answer:   Routine Release [[id number]]    Ambulatory Referral to Sleep Medicine     Referral Priority:   Routine     Referral Type:   Consultation     Referral Reason:   Specialty Services Required     Referred to Provider:   Shravan Ortega MD     Requested Specialty:   Sleep Medicine     Number of Visits Requested:   1    Ambulatory Referral to Pulmonology     Referral Priority:   Routine     Referral Type:   Consultation     Referral Reason:   Specialty Services Required     Requested Specialty:   Pulmonary Disease     Number of Visits Requested:   1    Stress Test With Myocardial Perfusion One Day     Standing Status:   Future     Standing Expiration Date:   1/21/2026     Order Specific Question:   What stress agent will be used?     Answer:   Exercise with possible pharmacologic     Order Specific Question:   Reason for exam?     Answer:   Known Coronary Artery Disease     Order Specific Question:   Known CAD specification?     Answer:   Follow-up with New or Worsening Symptoms     Order Specific Question:   Release to patient     Answer:   Routine Release [[id number]]     ECG 12 Lead     This order was created via procedure documentation     Order Specific Question:   Release to patient     Answer:   Routine Release [[id number]]          Plan:       1.  Coronary artery disease.  He denies any chest pain but continues reporting dyspnea on exertion.  His risk modification is poor.  Recommended proceeding with a stress test.  I sent in a prescription for aspirin.  His prescriptions are covered in full.      2.  Dyspnea on exertion.  As above, will proceed with stress testing.  I ordered a chest x-ray as well.  He also requested a pulmonary referral.      3.  Hypertension.  His blood pressure remains elevated.  It has been rather difficult to control.  The untreated sleep apnea is certainly playing a role.  I have placed a referral to sleep medicine.  In addition, recommended increasing the carvedilol and starting losartan.  Regarding the palpitations, he would like to hold off on Holter monitoring.  He will call me if they return.      4.  Hyperlipidemia.  Lipid panel from December 2024 demonstrated an LDL of 100 and an HDL of 36.  Recommended increasing the atorvastatin to 80 mg.      5.  Tobacco abuse.  He did not tolerate the nicotine patches.  We discussed the possibility of Wellbutrin and Chantix.  He is going to give it some thought.      Further recommendations will be made pending the results of the above.  Otherwise, he will follow-up with Dr. Guillaume as scheduled in March.      As always, it has been a pleasure to participate in your patient's care.      Sincerely,         STANLEY Schneider

## 2025-01-24 ENCOUNTER — TELEPHONE (OUTPATIENT)
Dept: CARDIOLOGY | Facility: CLINIC | Age: 50
End: 2025-01-24
Payer: COMMERCIAL

## 2025-05-21 RX ORDER — LOSARTAN POTASSIUM 25 MG/1
25 TABLET ORAL DAILY
Qty: 90 TABLET | Refills: 0 | Status: SHIPPED | OUTPATIENT
Start: 2025-05-21

## 2025-05-21 RX ORDER — ATORVASTATIN CALCIUM 40 MG/1
40 TABLET, FILM COATED ORAL DAILY
Qty: 90 TABLET | Refills: 0 | OUTPATIENT
Start: 2025-05-21

## 2025-05-21 RX ORDER — ATORVASTATIN CALCIUM 80 MG/1
80 TABLET, FILM COATED ORAL DAILY
Qty: 90 TABLET | Refills: 0 | Status: SHIPPED | OUTPATIENT
Start: 2025-05-21

## 2025-05-22 RX ORDER — CARVEDILOL 25 MG/1
25 TABLET ORAL 2 TIMES DAILY
Qty: 180 TABLET | Refills: 0 | Status: SHIPPED | OUTPATIENT
Start: 2025-05-22

## (undated) DEVICE — GLIDESHEATH BASIC HYDROPHILIC COATED INTRODUCER SHEATH: Brand: GLIDESHEATH

## (undated) DEVICE — BITEBLOCK OMNI BLOC

## (undated) DEVICE — TUBING, SUCTION, 1/4" X 10', STRAIGHT: Brand: MEDLINE

## (undated) DEVICE — TREK CORONARY DILATATION CATHETER 2.50 MM X 15 MM / RAPID-EXCHANGE: Brand: TREK

## (undated) DEVICE — 6F .070 JR 4 100CM: Brand: CORDIS

## (undated) DEVICE — GW DIAG EMERALD MOVE J STD .035 3MM 150CM

## (undated) DEVICE — CATH ASPIR EXPORTADVANCE 6F .014IN 140CM

## (undated) DEVICE — CANN NASL CO2 TRULINK W/O2 A/

## (undated) DEVICE — DEV CLIP ENDO RESOLUTION360 CONTRL ROT 235CM
Type: IMPLANTABLE DEVICE | Site: ESOPHAGUS | Status: NON-FUNCTIONAL
Removed: 2018-01-07

## (undated) DEVICE — CATH VENT MIV RADL PIG ST TIP 5F 110CM

## (undated) DEVICE — BIPOLAR ELECTROHEMOSTASIS CATHETER: Brand: INJECTION GOLD PROBE

## (undated) DEVICE — HI-TORQUE BALANCE MIDDLEWEIGHT UNIVERSAL II GUIDE WIRE STRAIGHT TIP PAK  190 CM: Brand: HI-TORQUE BALANCE MIDDLEWEIGHT UNIVERSAL II

## (undated) DEVICE — CATH DIAG IMPULSE FL3.5 5F 100CM

## (undated) DEVICE — BND PRESS RADL COMFRT 14IN STRL

## (undated) DEVICE — TBG 02 CRUSH RESIST LF CLR 7FT

## (undated) DEVICE — Device: Brand: DEFENDO AIR/WATER/SUCTION AND BIOPSY VALVE

## (undated) DEVICE — CATH DIAG IMPULSE FR4 5F 100CM

## (undated) DEVICE — NC TREK CORONARY DILATATION CATHETER 4.0 MM X 20 MM / RAPID-EXCHANGE: Brand: NC TREK